# Patient Record
Sex: FEMALE | Race: WHITE | Employment: OTHER | URBAN - METROPOLITAN AREA
[De-identification: names, ages, dates, MRNs, and addresses within clinical notes are randomized per-mention and may not be internally consistent; named-entity substitution may affect disease eponyms.]

---

## 2021-11-04 ENCOUNTER — OFFICE VISIT (OUTPATIENT)
Dept: FAMILY MEDICINE CLINIC | Age: 66
End: 2021-11-04
Payer: MEDICARE

## 2021-11-04 ENCOUNTER — NURSE ONLY (OUTPATIENT)
Dept: LAB | Age: 66
End: 2021-11-04

## 2021-11-04 VITALS
RESPIRATION RATE: 12 BRPM | HEIGHT: 63 IN | HEART RATE: 78 BPM | BODY MASS INDEX: 34.38 KG/M2 | WEIGHT: 194 LBS | TEMPERATURE: 97.9 F | SYSTOLIC BLOOD PRESSURE: 112 MMHG | DIASTOLIC BLOOD PRESSURE: 68 MMHG

## 2021-11-04 DIAGNOSIS — G60.9 IDIOPATHIC PERIPHERAL NEUROPATHY: ICD-10-CM

## 2021-11-04 DIAGNOSIS — M81.0 OSTEOPOROSIS WITHOUT CURRENT PATHOLOGICAL FRACTURE, UNSPECIFIED OSTEOPOROSIS TYPE: ICD-10-CM

## 2021-11-04 DIAGNOSIS — Z12.31 ENCOUNTER FOR SCREENING MAMMOGRAM FOR BREAST CANCER: ICD-10-CM

## 2021-11-04 DIAGNOSIS — Z13.31 POSITIVE DEPRESSION SCREENING: ICD-10-CM

## 2021-11-04 DIAGNOSIS — I25.10 CORONARY ARTERY DISEASE INVOLVING NATIVE CORONARY ARTERY OF NATIVE HEART WITHOUT ANGINA PECTORIS: ICD-10-CM

## 2021-11-04 DIAGNOSIS — G43.009 MIGRAINE WITHOUT AURA AND WITHOUT STATUS MIGRAINOSUS, NOT INTRACTABLE: ICD-10-CM

## 2021-11-04 DIAGNOSIS — I25.10 CORONARY ARTERY DISEASE INVOLVING NATIVE CORONARY ARTERY OF NATIVE HEART WITHOUT ANGINA PECTORIS: Primary | ICD-10-CM

## 2021-11-04 DIAGNOSIS — R09.82 POST-NASAL DRAINAGE: ICD-10-CM

## 2021-11-04 DIAGNOSIS — E53.8 VITAMIN B 12 DEFICIENCY: ICD-10-CM

## 2021-11-04 DIAGNOSIS — K21.9 GASTROESOPHAGEAL REFLUX DISEASE WITHOUT ESOPHAGITIS: ICD-10-CM

## 2021-11-04 LAB
ALBUMIN SERPL-MCNC: 4.7 G/DL (ref 3.5–5.1)
ALP BLD-CCNC: 124 U/L (ref 38–126)
ALT SERPL-CCNC: 12 U/L (ref 11–66)
ANION GAP SERPL CALCULATED.3IONS-SCNC: 11 MEQ/L (ref 8–16)
AST SERPL-CCNC: 17 U/L (ref 5–40)
BACTERIA: ABNORMAL
BASOPHILS # BLD: 1 %
BASOPHILS ABSOLUTE: 0.1 THOU/MM3 (ref 0–0.1)
BILIRUB SERPL-MCNC: 0.3 MG/DL (ref 0.3–1.2)
BILIRUBIN URINE: NEGATIVE
BLOOD, URINE: ABNORMAL
BUN BLDV-MCNC: 7 MG/DL (ref 7–22)
CALCIUM SERPL-MCNC: 9.3 MG/DL (ref 8.5–10.5)
CASTS: ABNORMAL /LPF
CHARACTER, URINE: CLEAR
CHLORIDE BLD-SCNC: 97 MEQ/L (ref 98–111)
CHOLESTEROL, TOTAL: 288 MG/DL (ref 100–199)
CO2: 30 MEQ/L (ref 23–33)
COLOR: YELLOW
CREAT SERPL-MCNC: 0.5 MG/DL (ref 0.4–1.2)
EOSINOPHIL # BLD: 1.6 %
EOSINOPHILS ABSOLUTE: 0.1 THOU/MM3 (ref 0–0.4)
EPITHELIAL CELLS, UA: ABNORMAL /HPF
ERYTHROCYTE [DISTWIDTH] IN BLOOD BY AUTOMATED COUNT: 13.7 % (ref 11.5–14.5)
ERYTHROCYTE [DISTWIDTH] IN BLOOD BY AUTOMATED COUNT: 50.7 FL (ref 35–45)
FOLATE: 13.5 NG/ML (ref 4.8–24.2)
GFR SERPL CREATININE-BSD FRML MDRD: > 90 ML/MIN/1.73M2
GLUCOSE BLD-MCNC: 100 MG/DL (ref 70–108)
GLUCOSE, URINE: NEGATIVE MG/DL
HCT VFR BLD CALC: 47.1 % (ref 37–47)
HDLC SERPL-MCNC: 72 MG/DL
HEMOGLOBIN: 14.7 GM/DL (ref 12–16)
IMMATURE GRANS (ABS): 0.03 THOU/MM3 (ref 0–0.07)
IMMATURE GRANULOCYTES: 0.3 %
KETONES, URINE: NEGATIVE
LDL CHOLESTEROL CALCULATED: 163 MG/DL
LEUKOCYTE ESTERASE, URINE: NEGATIVE
LYMPHOCYTES # BLD: 23.6 %
LYMPHOCYTES ABSOLUTE: 2.1 THOU/MM3 (ref 1–4.8)
MAGNESIUM: 1.9 MG/DL (ref 1.6–2.4)
MCH RBC QN AUTO: 31.1 PG (ref 26–33)
MCHC RBC AUTO-ENTMCNC: 31.2 GM/DL (ref 32.2–35.5)
MCV RBC AUTO: 99.6 FL (ref 81–99)
MONOCYTES # BLD: 4.8 %
MONOCYTES ABSOLUTE: 0.4 THOU/MM3 (ref 0.4–1.3)
NITRITE, URINE: NEGATIVE
NUCLEATED RED BLOOD CELLS: 0 /100 WBC
PH UA: 8 (ref 5–9)
PLATELET # BLD: 312 THOU/MM3 (ref 130–400)
PMV BLD AUTO: 9.7 FL (ref 9.4–12.4)
POTASSIUM SERPL-SCNC: 3.7 MEQ/L (ref 3.5–5.2)
PROTEIN UA: ABNORMAL MG/DL
RBC # BLD: 4.73 MILL/MM3 (ref 4.2–5.4)
RBC URINE: ABNORMAL /HPF
SEG NEUTROPHILS: 68.7 %
SEGMENTED NEUTROPHILS ABSOLUTE COUNT: 6 THOU/MM3 (ref 1.8–7.7)
SODIUM BLD-SCNC: 138 MEQ/L (ref 135–145)
SPECIFIC GRAVITY UA: 1.02 (ref 1–1.03)
TOTAL PROTEIN: 7.1 G/DL (ref 6.1–8)
TRIGL SERPL-MCNC: 266 MG/DL (ref 0–199)
TSH SERPL DL<=0.05 MIU/L-ACNC: 1.93 UIU/ML (ref 0.4–4.2)
UROBILINOGEN, URINE: 1 EU/DL (ref 0–1)
VITAMIN B-12: 710 PG/ML (ref 211–911)
VITAMIN D 25-HYDROXY: 41 NG/ML (ref 30–100)
WBC # BLD: 8.7 THOU/MM3 (ref 4.8–10.8)
WBC UA: ABNORMAL /HPF
YEAST: ABNORMAL

## 2021-11-04 PROCEDURE — 1123F ACP DISCUSS/DSCN MKR DOCD: CPT | Performed by: FAMILY MEDICINE

## 2021-11-04 PROCEDURE — 1090F PRES/ABSN URINE INCON ASSESS: CPT | Performed by: FAMILY MEDICINE

## 2021-11-04 PROCEDURE — G8417 CALC BMI ABV UP PARAM F/U: HCPCS | Performed by: FAMILY MEDICINE

## 2021-11-04 PROCEDURE — G8484 FLU IMMUNIZE NO ADMIN: HCPCS | Performed by: FAMILY MEDICINE

## 2021-11-04 PROCEDURE — G8400 PT W/DXA NO RESULTS DOC: HCPCS | Performed by: FAMILY MEDICINE

## 2021-11-04 PROCEDURE — 4004F PT TOBACCO SCREEN RCVD TLK: CPT | Performed by: FAMILY MEDICINE

## 2021-11-04 PROCEDURE — 4040F PNEUMOC VAC/ADMIN/RCVD: CPT | Performed by: FAMILY MEDICINE

## 2021-11-04 PROCEDURE — G8427 DOCREV CUR MEDS BY ELIG CLIN: HCPCS | Performed by: FAMILY MEDICINE

## 2021-11-04 PROCEDURE — 99204 OFFICE O/P NEW MOD 45 MIN: CPT | Performed by: FAMILY MEDICINE

## 2021-11-04 PROCEDURE — G8431 POS CLIN DEPRES SCRN F/U DOC: HCPCS | Performed by: FAMILY MEDICINE

## 2021-11-04 PROCEDURE — 3017F COLORECTAL CA SCREEN DOC REV: CPT | Performed by: FAMILY MEDICINE

## 2021-11-04 RX ORDER — OMEPRAZOLE 40 MG/1
40 CAPSULE, DELAYED RELEASE ORAL DAILY
Qty: 90 CAPSULE | Refills: 1 | Status: SHIPPED | OUTPATIENT
Start: 2021-11-04

## 2021-11-04 RX ORDER — FLUTICASONE PROPIONATE 50 MCG
2 SPRAY, SUSPENSION (ML) NASAL DAILY
Qty: 48 G | Refills: 1 | Status: SHIPPED | OUTPATIENT
Start: 2021-11-04

## 2021-11-04 RX ORDER — CHOLECALCIFEROL (VITAMIN D3) 125 MCG
CAPSULE ORAL DAILY
COMMUNITY

## 2021-11-04 RX ORDER — CELECOXIB 200 MG/1
200 CAPSULE ORAL DAILY
COMMUNITY
End: 2022-04-04

## 2021-11-04 RX ORDER — GABAPENTIN 300 MG/1
300 CAPSULE ORAL
COMMUNITY

## 2021-11-04 RX ORDER — RALOXIFENE HYDROCHLORIDE 60 MG/1
60 TABLET, FILM COATED ORAL DAILY
Qty: 90 TABLET | Refills: 1 | Status: SHIPPED | OUTPATIENT
Start: 2021-11-04 | End: 2022-03-08

## 2021-11-04 RX ORDER — DILTIAZEM HYDROCHLORIDE 120 MG/1
120 CAPSULE, EXTENDED RELEASE ORAL DAILY
Qty: 90 CAPSULE | Refills: 1 | Status: SHIPPED | OUTPATIENT
Start: 2021-11-04 | End: 2022-03-08

## 2021-11-04 RX ORDER — DILTIAZEM HYDROCHLORIDE 120 MG/1
120 CAPSULE, EXTENDED RELEASE ORAL DAILY
COMMUNITY
End: 2021-11-04 | Stop reason: SDUPTHER

## 2021-11-04 RX ORDER — ATORVASTATIN CALCIUM 10 MG/1
10 TABLET, FILM COATED ORAL DAILY
Qty: 30 TABLET | Refills: 3 | Status: SHIPPED | OUTPATIENT
Start: 2021-11-04 | End: 2022-04-04

## 2021-11-04 RX ORDER — SERTRALINE HYDROCHLORIDE 100 MG/1
100 TABLET, FILM COATED ORAL DAILY
Qty: 135 TABLET | Refills: 1 | Status: SHIPPED | OUTPATIENT
Start: 2021-11-04 | End: 2022-03-08

## 2021-11-04 RX ORDER — RALOXIFENE HYDROCHLORIDE 60 MG/1
60 TABLET, FILM COATED ORAL DAILY
COMMUNITY
End: 2021-11-04 | Stop reason: SDUPTHER

## 2021-11-04 RX ORDER — MULTIVIT WITH MINERALS/LUTEIN
1000 TABLET ORAL DAILY
COMMUNITY

## 2021-11-04 RX ORDER — SERTRALINE HYDROCHLORIDE 100 MG/1
100 TABLET, FILM COATED ORAL DAILY
COMMUNITY
End: 2021-11-04 | Stop reason: SDUPTHER

## 2021-11-04 RX ORDER — CYCLOBENZAPRINE HCL 5 MG
5 TABLET ORAL 3 TIMES DAILY PRN
COMMUNITY
End: 2022-04-04

## 2021-11-04 RX ORDER — CYANOCOBALAMIN 1000 UG/ML
1000 INJECTION INTRAMUSCULAR; SUBCUTANEOUS
COMMUNITY
End: 2022-01-03 | Stop reason: SDUPTHER

## 2021-11-04 SDOH — ECONOMIC STABILITY: FOOD INSECURITY: WITHIN THE PAST 12 MONTHS, THE FOOD YOU BOUGHT JUST DIDN'T LAST AND YOU DIDN'T HAVE MONEY TO GET MORE.: PATIENT DECLINED

## 2021-11-04 SDOH — ECONOMIC STABILITY: FOOD INSECURITY: WITHIN THE PAST 12 MONTHS, YOU WORRIED THAT YOUR FOOD WOULD RUN OUT BEFORE YOU GOT MONEY TO BUY MORE.: PATIENT DECLINED

## 2021-11-04 ASSESSMENT — PATIENT HEALTH QUESTIONNAIRE - PHQ9
SUM OF ALL RESPONSES TO PHQ9 QUESTIONS 1 & 2: 3
7. TROUBLE CONCENTRATING ON THINGS, SUCH AS READING THE NEWSPAPER OR WATCHING TELEVISION: 0
SUM OF ALL RESPONSES TO PHQ QUESTIONS 1-9: 11
4. FEELING TIRED OR HAVING LITTLE ENERGY: 3
2. FEELING DOWN, DEPRESSED OR HOPELESS: 0
5. POOR APPETITE OR OVEREATING: 0
6. FEELING BAD ABOUT YOURSELF - OR THAT YOU ARE A FAILURE OR HAVE LET YOURSELF OR YOUR FAMILY DOWN: 0
9. THOUGHTS THAT YOU WOULD BE BETTER OFF DEAD, OR OF HURTING YOURSELF: 0
SUM OF ALL RESPONSES TO PHQ QUESTIONS 1-9: 11
1. LITTLE INTEREST OR PLEASURE IN DOING THINGS: 3
SUM OF ALL RESPONSES TO PHQ QUESTIONS 1-9: 11
3. TROUBLE FALLING OR STAYING ASLEEP: 2
8. MOVING OR SPEAKING SO SLOWLY THAT OTHER PEOPLE COULD HAVE NOTICED. OR THE OPPOSITE, BEING SO FIGETY OR RESTLESS THAT YOU HAVE BEEN MOVING AROUND A LOT MORE THAN USUAL: 3
10. IF YOU CHECKED OFF ANY PROBLEMS, HOW DIFFICULT HAVE THESE PROBLEMS MADE IT FOR YOU TO DO YOUR WORK, TAKE CARE OF THINGS AT HOME, OR GET ALONG WITH OTHER PEOPLE: 0

## 2021-11-04 ASSESSMENT — SOCIAL DETERMINANTS OF HEALTH (SDOH): HOW HARD IS IT FOR YOU TO PAY FOR THE VERY BASICS LIKE FOOD, HOUSING, MEDICAL CARE, AND HEATING?: PATIENT DECLINED

## 2021-11-04 ASSESSMENT — COLUMBIA-SUICIDE SEVERITY RATING SCALE - C-SSRS
2. HAVE YOU ACTUALLY HAD ANY THOUGHTS OF KILLING YOURSELF?: NO
1. WITHIN THE PAST MONTH, HAVE YOU WISHED YOU WERE DEAD OR WISHED YOU COULD GO TO SLEEP AND NOT WAKE UP?: NO
6. HAVE YOU EVER DONE ANYTHING, STARTED TO DO ANYTHING, OR PREPARED TO DO ANYTHING TO END YOUR LIFE?: NO

## 2021-11-04 NOTE — PROGRESS NOTES
[address] [place]  [address] [place], [address]  Ph:   [phone]  Fax: [phone]    Provider:  Dr. Benjamin Daley Patient Progress Note    Patient:  Sammie Larios  YOB: 1955      Visit Date:  11/4/2021                                              Reason For Visit:   Chief Complaint   Patient presents with    New Patient     establish pcp    Headache     2nd episode of headache Pain is constant from bridge of nose back     Drainage     x 2-3 days drainage     Insomnia     Problems staying asleep         Aura Julieta is a 74 y.o. female, new patient, who comes today to the office because of follow-up of her multiple medical problems and to establish a primary care physician. Today she complains of headache which has been present for about 5 or 6 days, localized mainly in the frontal aspect of the face and goes to the top of the head. She does admit also postnasal drainage for the last several days. She does has history of migraines headaches but this 1 feels different. Also she is having prominence staying asleep. Her past medical history remarkable for coronary artery disease, status post angioplasty in 1997. She is not taking a statin or beta-blocker. She is taking Cardizem and a baby aspirin a day. She needs a referral to a new cardiologist locally. She denies any chest pain, shortness of breath, edema lower extremities. She also has history osteoporosis, diagnosed with a bone density. She is taking Evista 60 mg 1 tablet mouth daily, vitamin D 5000 international units every day. She is taking calcium carbonate 600 mg 2 a day. History of depression for which she is taking Zoloft 150 mg daily. Her symptoms seems to be fairly well controlled. She does have chronic leg pain and she has been diagnosed with idiopathic peripheral neuropathy. She does not know the reason for it but she does have history of vitamin B12 deficiency.   She is taking Neurontin 300 mg every day, Celebrex 200 mg p.o. daily and vitamin B12 1000 mcg IM every month. She has GERD and takes Prilosec 40 mg every day and is controlling the symptoms.     Significant Past Medical History:      Past Medical History:   Diagnosis Date    Age-related osteoporosis without current pathological fracture     Coronary artery disease involving native coronary artery of native heart without angina pectoris     s/p angioplasty     Depression 2015    GERD without esophagitis     Idiopathic peripheral neuropathy     Lumbar degenerative disc disease 2015    Sleep apnea 2000    uvulectomy/oral surgery    Vitamin B12 deficiency          Past Surgical History:   Procedure Laterality Date   [address]    ARTHROPLASTY  2020    Left basal arthroplasty lignament reconstruction    [address] [facility]      CARDIAC CATHETERIZATION  2017    CARPAL TUNNEL RELEASE      Right 2012 Left 2013    CHOLECYSTECTOMY      HYSTERECTOMY, TOTAL ABDOMINAL  1981     left ovary removed     NECK SURGERY  2012    Fatty Tumor removed    [address] [facility] THROAT SURGERY  2009    For sleep apnea     TONSILLECTOMY  [address]      Right leg vein ligation and stripping      Family History   Problem Relation Age of Onset    Heart Disease Mother         pacemaker     Stroke Mother     Heart Attack Mother     Parkinsonism Mother     No Known Problems Father     Heart Attack Sister          at 61    Parkinsonism Maternal Grandmother     Heart Disease Maternal Grandfather      Social History     Socioeconomic History    Marital status:      Spouse name: None    Number of children: None    Years of education: None    Highest education level: None   Occupational History    None   Tobacco Use    Smoking status: Current Every Day Smoker     Packs/day: 0.50     Years: 21.00     Pack years: 10.50    Smokeless tobacco: Never Used   Vaping Use    Vaping Use: Never used   Substance and Sexual Activity    Alcohol use: Not Currently    Drug use: Not Currently    Sexual activity: Not Currently   Other Topics Concern    None   Social History Narrative    None     Social Determinants of Health     Financial Resource Strain: Unknown    Difficulty of Paying Living Expenses: Patient refused   Food Insecurity: Unknown    Worried About Running Out of Food in the Last Year: Patient refused    Ran Out of Food in the Last Year: Patient refused   Transportation Needs:     Lack of Transportation (Medical):  Lack of Transportation (Non-Medical):    Physical Activity:     Days of Exercise per Week:     Minutes of Exercise per Session:    Stress:     Feeling of Stress :    Social Connections:     Frequency of Communication with Friends and Family:     Frequency of Social Gatherings with Friends and Family:     Attends Latter day Services:     Active Member of Clubs or Organizations:     Attends Club or Organization Meetings:     Marital Status:    Intimate Partner Violence:     Fear of Current or Ex-Partner:     Emotionally Abused:     Physically Abused:     Sexually Abused:      Health Maintenance Due   Topic Date Due    Hepatitis C screen  Never done    DTaP/Tdap/Td vaccine (1 - Tdap) Never done    Colon cancer screen colonoscopy  Never done    Shingles Vaccine (1 of 2) Never done    DEXA (modify frequency per FRAX score)  Never done    Pneumococcal 65+ years Vaccine (1 of 1 - PPSV23) Never done    Flu vaccine (1) Never done   [employer] Visit (AWV)  Never done       Allergies:  has No Known Allergies. Medications:   Current Outpatient Medications   Medication Sig Dispense Refill    gabapentin (NEURONTIN) 300 MG capsule Take 300 mg by mouth.  2 tabs x 3 daily      celecoxib (CELEBREX) 200 MG capsule Take 200 mg by mouth daily      cyclobenzaprine (FLEXERIL) 5 MG tablet Take 5 mg by mouth 3 times daily as needed for Muscle spasms 1-3 daily prn, 2 @@ bedtime      ASPIRIN 81 PO Take by mouth daily      Cholecalciferol (VITAMIN D) 125 MCG (5000 UT) CAPS Take by mouth daily      cyanocobalamin 1000 MCG/ML injection Inject 1,000 mcg into the muscle Every 2 wks      Calcium Carbonate (CALCIUM 600 PO) Take 600 mg by mouth 1 tab x 2 daily      Ascorbic Acid (VITAMIN C) 1000 MG tablet Take 1,000 mg by mouth daily 1 tab x 2 daily      dilTIAZem (CARDIZEM 12 HR) 120 MG extended release capsule Take 1 capsule by mouth daily 90 capsule 1    sertraline (ZOLOFT) 100 MG tablet Take 1 tablet by mouth daily 1.5 daily 135 tablet 1    raloxifene (EVISTA) 60 MG tablet Take 1 tablet by mouth daily 90 tablet 1    omeprazole (PRILOSEC) 40 MG delayed release capsule Take 1 capsule by mouth daily 1 tab x 2 daily 90 capsule 1    atorvastatin (LIPITOR) 10 MG tablet Take 1 tablet by mouth daily 30 tablet 3    fluticasone (FLONASE) 50 MCG/ACT nasal spray 2 sprays by Each Nostril route daily 48 g 1     No current facility-administered medications for this visit. Review of systems:  Review of Systems - History obtained from the patient  General ROS: negative for - chills, fatigue or fever  Psychological ROS: negative for - anxiety, depression or sleep disturbances  ENT ROS: positive for - headaches, post nasal drip.    Allergy and Immunology ROS: negative for - seasonal allergies  Hematological and Lymphatic ROS: negative for - bruising  Endocrine ROS: negative for - malaise/lethargy, polydipsia/polyuria or temperature intolerance  Respiratory ROS: negative for - cough,  shortness of breath or wheezing  Cardiovascular ROS: negative for - chest pain or edema  Gastrointestinal ROS: negative for - abdominal pain, constipation, diarrhea or nausea/vomiting  Musculoskeletal ROS: positive for -  muscle pain, leg pain  Neurological ROS: negative for - dizziness  Dermatological ROS: positive for - rash in face    Physical Examination:  Blood pressure 112/68, pulse 78, temperature 97.9 °F (36.6 °C), temperature source Temporal, resp. rate 12, height 5' 3\" (1.6 m), weight 194 lb (88 kg). General-  Alert and oriented x 3, NAD  HEENT: NC, AT, PERRLA, EOMI, anicteric sclerae  Ears: Normal tympanic membranes bilaterally  Nose: swollen turbinates, clear drainage  Mouth: no lesions, moist mucosas  Neck - supple, no significant adenopathy  Chest - clear to auscultation, no wheezes, rales or rhonchi, symmetric air entry  Heart - normal rate, regular rhythm, normal S1, S2, no murmurs, rubs, clicks or gallops  Abdomen - soft, nontender, nondistended, no masses or organomegaly  Extremities - peripheral pulses normal, no pedal edema, no clubbing or cyanosis  Skin - erythematous rash in face, scattered. 1/-1 cm diameter scaly. Treated as \"fungus by dermatologist\"    Impression:   Diagnosis Orders   1. Coronary artery disease involving native coronary artery of native heart without angina pectoris  Lipid Panel    Urinalysis with Microscopic    Comprehensive Metabolic Panel    Christi Bradshaw MD, Cardiology, [facility]   2. Osteoporosis without current pathological fracture, unspecified osteoporosis type  CBC Auto Differential    DEXA BONE DENSITY 2 SITES    Magnesium   3. Vitamin B 12 deficiency  Vitamin B12    Folate   4. Gastroesophageal reflux disease without esophagitis     5. Idiopathic peripheral neuropathy  TSH without Reflex   6. Encounter for screening mammogram for breast cancer  YURI DIGITAL SCREEN W OR WO CAD BILATERAL   7. BMI 34.0-34.9,adult  Vitamin D 25 Hydroxy   8. Body mass index (BMI) 34.0-34.9, adult   CBC Auto Differential   9. Migraine without aura and without status migrainosus, not intractable     10. Post-nasal drainage     11. Positive depression screening  Positive Screen for Clinical Depression with a Documented Follow-up Plan        Plan:   We will start her on Lipitor 10 mg 1 tablet mouth daily and titrate up until we reached the dose of 40 mg every day. Will refer her to cardiology for further evaluation and to establish. We will do lab work and assess his risk factors including CBC, comprehensive panel, lipid profile, magnesium, vitamin B12, folate, TSH and vitamin D. Continue Cardizem 120 mg 1 extended release capsules 1 tablet mouth daily. Continue sertraline for 100 mg 1/2 tablet p.o. daily. Continue Prilosec 40 mg 1 tablet mouth daily. I explained to her that if she is osteoporotic we may try to wean her off the Prilosec or go to the minimal dose to control her symptoms. For now continue Evista 60 mg 1 tab mouth daily. Continue Celebrex and Flexeril as per her pain management physician that she plans to continue seeing. Start Flonase nasal spray 2 sprays each nostril once a day and see if this helps with a headache that seems to be related to rhinitis. Continue calcium carbonate 600 mg 1 tablet mouth twice a day. Continue vitamin D 5000 international units every day. We will schedule her screening mammogram and bone density. We will see her for follow-up in 4 weeks for her annual wellness exam, discuss test results and create a plan for the future.   She states she is up-to-date with her vaccinations    Orders Placed This Encounter   Procedures    YURI DIGITAL SCREEN W OR WO CAD BILATERAL     Standing Status:   Future     Standing Expiration Date:   1/6/2023    DEXA BONE DENSITY 2 SITES     Standing Status:   Future     Standing Expiration Date:   11/4/2022    CBC Auto Differential     Standing Status:   Future     Number of Occurrences:   1     Standing Expiration Date:   11/4/2022    Lipid Panel     Standing Status:   Future     Number of Occurrences:   1     Standing Expiration Date:   11/4/2022     Order Specific Question:   Is Patient Fasting?/# of Hours     Answer:   12 hour    Vitamin D 25 Hydroxy     Standing Status:   Future     Number of Occurrences:   1     Standing Expiration Date:   11/4/2022    Urinalysis with Microscopic     Standing Status:   Future     Number of Occurrences:   1     Standing Expiration Date:   11/5/2022     Order Specific Question:   SPECIFY(EX-CATH,MIDSTREAM,CYSTO,ETC)? Answer:   midstream    TSH without Reflex     Standing Status:   Future     Number of Occurrences:   1     Standing Expiration Date:   11/4/2022    Magnesium     Standing Status:   Future     Number of Occurrences:   1     Standing Expiration Date:   11/4/2022    Vitamin B12     Standing Status:   Future     Number of Occurrences:   1     Standing Expiration Date:   11/4/2022    Comprehensive Metabolic Panel     Standing Status:   Future     Number of Occurrences:   1     Standing Expiration Date:   11/4/2022    Folate     Standing Status:   Future     Number of Occurrences:   1     Standing Expiration Date:   11/4/2022   Jean Aguilar MD, Cardiology, [facility]     Referral Priority:   Routine     Referral Type:   Eval and Treat     Referral Reason:   Specialty Services Required     Referred to Provider:   Eveline Ramirez MD     Requested Specialty:   Cardiology     Number of Visits Requested:   1    Positive Screen for Clinical Depression with a Documented Follow-up Plan        Follow Up:  Return in about 4 weeks (around 12/2/2021) for Medicare AWV.     Nikki Garza MD

## 2021-11-08 ENCOUNTER — TELEPHONE (OUTPATIENT)
Dept: FAMILY MEDICINE CLINIC | Age: 66
End: 2021-11-08

## 2021-11-08 DIAGNOSIS — E78.00 HIGH CHOLESTEROL: Primary | ICD-10-CM

## 2021-11-08 NOTE — TELEPHONE ENCOUNTER
Mignon Stubbs MD   11/4/2021  5:48 PM EDT         Most of the blood test within acceptable ranges.  We will discuss more in detail during the next follow-up appointment. Kyle Laughlin lipid profile showed total cholesterol 288 which is elevated, triglycerides 266 which are also elevated.  The good cholesterol was 72 which is excellent and the bad cholesterol 162 which in her specific situation should be below 100.  Start the statin given today during the visit and will repeat fasting lipid profile in 6 weeks.

## 2021-11-08 NOTE — TELEPHONE ENCOUNTER
Spoke to the patient regarding results. Verbalized understanding. Mailing lab order to repeat in 6 wks.

## 2021-12-02 ENCOUNTER — OFFICE VISIT (OUTPATIENT)
Dept: FAMILY MEDICINE CLINIC | Age: 66
End: 2021-12-02
Payer: MEDICARE

## 2021-12-02 VITALS
HEIGHT: 63 IN | DIASTOLIC BLOOD PRESSURE: 58 MMHG | SYSTOLIC BLOOD PRESSURE: 119 MMHG | HEART RATE: 83 BPM | TEMPERATURE: 96.4 F | RESPIRATION RATE: 12 BRPM | WEIGHT: 192.8 LBS | BODY MASS INDEX: 34.16 KG/M2

## 2021-12-02 DIAGNOSIS — E53.8 VITAMIN B 12 DEFICIENCY: ICD-10-CM

## 2021-12-02 DIAGNOSIS — M79.10 MYALGIA: ICD-10-CM

## 2021-12-02 DIAGNOSIS — G60.9 IDIOPATHIC PERIPHERAL NEUROPATHY: ICD-10-CM

## 2021-12-02 DIAGNOSIS — I25.10 CORONARY ARTERY DISEASE INVOLVING NATIVE CORONARY ARTERY OF NATIVE HEART WITHOUT ANGINA PECTORIS: Primary | ICD-10-CM

## 2021-12-02 DIAGNOSIS — K21.9 GASTROESOPHAGEAL REFLUX DISEASE WITHOUT ESOPHAGITIS: ICD-10-CM

## 2021-12-02 PROCEDURE — 3017F COLORECTAL CA SCREEN DOC REV: CPT | Performed by: FAMILY MEDICINE

## 2021-12-02 PROCEDURE — G8417 CALC BMI ABV UP PARAM F/U: HCPCS | Performed by: FAMILY MEDICINE

## 2021-12-02 PROCEDURE — 4040F PNEUMOC VAC/ADMIN/RCVD: CPT | Performed by: FAMILY MEDICINE

## 2021-12-02 PROCEDURE — 4004F PT TOBACCO SCREEN RCVD TLK: CPT | Performed by: FAMILY MEDICINE

## 2021-12-02 PROCEDURE — G8427 DOCREV CUR MEDS BY ELIG CLIN: HCPCS | Performed by: FAMILY MEDICINE

## 2021-12-02 PROCEDURE — G8484 FLU IMMUNIZE NO ADMIN: HCPCS | Performed by: FAMILY MEDICINE

## 2021-12-02 PROCEDURE — 1090F PRES/ABSN URINE INCON ASSESS: CPT | Performed by: FAMILY MEDICINE

## 2021-12-02 PROCEDURE — G8400 PT W/DXA NO RESULTS DOC: HCPCS | Performed by: FAMILY MEDICINE

## 2021-12-02 PROCEDURE — 99214 OFFICE O/P EST MOD 30 MIN: CPT | Performed by: FAMILY MEDICINE

## 2021-12-02 PROCEDURE — 1123F ACP DISCUSS/DSCN MKR DOCD: CPT | Performed by: FAMILY MEDICINE

## 2021-12-02 RX ORDER — ROSUVASTATIN CALCIUM 5 MG/1
5 TABLET, COATED ORAL NIGHTLY
Qty: 30 TABLET | Refills: 3 | Status: SHIPPED | OUTPATIENT
Start: 2021-12-02 | End: 2022-04-25

## 2021-12-02 RX ORDER — CETIRIZINE HYDROCHLORIDE 10 MG/1
10 TABLET ORAL DAILY
Qty: 90 TABLET | Refills: 1 | Status: SHIPPED | OUTPATIENT
Start: 2021-12-02 | End: 2022-03-08

## 2021-12-02 NOTE — PROGRESS NOTES
[address]  [address] [facility], [address]  Ph:   [phone]  Fax: [phone]    Provider:  Dr. Amy Montilla Note    Patient:  Noe Guo  YOB: 1955      Visit Date:  12/2/2021                                              Reason For Visit:   Chief Complaint   Patient presents with    Follow-up    Discuss Medications     Started Lipitor at last visit -- patient complains of increased muscle spasms mostly in legs         Lani Aguilar is a 74 y.o. female comes today to the office because of follow-up of blood test and her multiple medical problems. Recent blood test performed 11/4/2021 showed an hemoglobin of 14.7, hematocrit 47.1, platelets 991. Lipid profile showed total cholesterol 288, triglycerides 266, HDL 72, and . Folate 13.5, vitamin D 41, TSH 1.930. Urinalysis showed trace of protein, trace of blood. Magnesium was 1.9. Vitamin B12 710. Fasting glucose 100, creatinine 0.5, BUN 7, sodium 138, potassium 3.7, calcium 9.3, AST 17, total protein 7.1, alkaline phosphatase 124, ALT 12. GFR more than 90. Last visit she complained of headaches and she was found to have allergic rhinitis and she was a started on Flonase nasal spray 2 sprays each nostril once a day. The headache is much improved, but she still have some late in the afternoon. She is not taking any antihistaminic. Coronary artery disease:  status post angioplasty in 1997. Last visit she was a started on Lipitor 10 mg 1 tablet mouth daily, but she did not tolerated due to muscle aching in both lower extremities and left arm. She stopped the medication and the pain went away in the next 2 to 3 days. She is taking Cardizem and a baby aspirin a day. She denies any chest pain, shortness of breath, edema lower extremities. Last visit she was referred to the cardiology department to establish there, but she has not been called for that appointment yet.     Osteoporosis: She is taking Evista 60 mg 1 tablet mouth daily, vitamin D 5000 international units every day and calcium carbonate 600 mg 2 tablets a day. She was a scheduled for a bone density but it has not been done yet. Depression: She is taking Zoloft 100 mg 1-1/2 tablet for a total dose of 150 mg every day. She is stable. Idiopathic peripheral neuropathy: Diagnosed about 4 years ago. She has it in both legs but more prominent on the right leg. She also have history of vitamin B12 deficiency. She is taking Neurontin 300 mg every day, Celebrex 200 mg 1 tablet daily and vitamin B12 1000 mcg IM every 2 weeks. GERD: She is taking Prilosec 40 mg 1 tablet mouth daily and her symptoms are well controlled. Significant Past Medical History:      Past Medical History:   Diagnosis Date    Age-related osteoporosis without current pathological fracture 2015    Coronary artery disease involving native coronary artery of native heart without angina pectoris 1997    s/p angioplasty 1997    Depression 2015    GERD without esophagitis 2006    Idiopathic peripheral neuropathy 2004    Lumbar degenerative disc disease 2015    Sleep apnea 2000    uvulectomy/oral surgery    Vitamin B12 deficiency 2004         Health Maintenance Due   Topic Date Due    Hepatitis C screen  Never done    Colon cancer screen colonoscopy  Never done    DEXA (modify frequency per FRAX score)  Never done    Pneumococcal 65+ years Vaccine (2 of 2 - PPSV23) 06/30/2021    Annual Wellness Visit (AWV)  Never done       Allergies:  has No Known Allergies. Medications:   Current Outpatient Medications   Medication Sig Dispense Refill    rosuvastatin (CRESTOR) 5 MG tablet Take 1 tablet by mouth nightly 30 tablet 3    cetirizine (ZYRTEC) 10 MG tablet Take 1 tablet by mouth daily 90 tablet 1    gabapentin (NEURONTIN) 300 MG capsule Take 300 mg by mouth.  2 tabs x 3 daily      celecoxib (CELEBREX) 200 MG capsule Take 200 mg by mouth daily      cyclobenzaprine (FLEXERIL) 5 MG tablet Take 5 mg by mouth 3 times daily as needed for Muscle spasms 1-3 daily prn, 2 @@ bedtime      ASPIRIN 81 PO Take by mouth daily      Cholecalciferol (VITAMIN D) 125 MCG (5000 UT) CAPS Take by mouth daily      cyanocobalamin 1000 MCG/ML injection Inject 1,000 mcg into the muscle Every 2 wks      Calcium Carbonate (CALCIUM 600 PO) Take 600 mg by mouth 1 tab x 2 daily      Ascorbic Acid (VITAMIN C) 1000 MG tablet Take 1,000 mg by mouth daily 1 tab x 2 daily      dilTIAZem (CARDIZEM 12 HR) 120 MG extended release capsule Take 1 capsule by mouth daily 90 capsule 1    sertraline (ZOLOFT) 100 MG tablet Take 1 tablet by mouth daily 1.5 daily 135 tablet 1    raloxifene (EVISTA) 60 MG tablet Take 1 tablet by mouth daily 90 tablet 1    omeprazole (PRILOSEC) 40 MG delayed release capsule Take 1 capsule by mouth daily 1 tab x 2 daily 90 capsule 1    atorvastatin (LIPITOR) 10 MG tablet Take 1 tablet by mouth daily 30 tablet 3    fluticasone (FLONASE) 50 MCG/ACT nasal spray 2 sprays by Each Nostril route daily 48 g 1     No current facility-administered medications for this visit. Review of systems:  Review of Systems - History obtained from the patient  General ROS: negative for - chills, fatigue or fever  Psychological ROS: negative for - anxiety, depression or sleep disturbances  ENT ROS: positive for - post nasal drip, much better.  Headache basically resolved  Allergy and Immunology ROS: negative for - seasonal allergies  Hematological and Lymphatic ROS: negative for - bruising  Endocrine ROS: negative for - malaise/lethargy, polydipsia/polyuria or temperature intolerance  Respiratory ROS: negative for - cough,  shortness of breath or wheezing  Cardiovascular ROS: negative for - chest pain or edema  Gastrointestinal ROS: negative for - abdominal pain, constipation, diarrhea or nausea/vomiting  Musculoskeletal ROS: positive for -  muscle pain, leg pain  Neurological ROS: negative for - dizziness  Dermatological ROS: positive for - rash in face    Physical Examination:  Blood pressure (!) 119/58, pulse 83, temperature 96.4 °F (35.8 °C), temperature source Temporal, resp. rate 12, height 5' 3\" (1.6 m), weight 192 lb 12.8 oz (87.5 kg). General-  Alert and oriented x 3, NAD  HEENT: NC, AT, PERRLA, EOMI, anicteric sclerae  Ears: Normal tympanic membranes bilaterally  Nose: swollen turbinates, clear drainage  Mouth: no lesions, moist mucosas  Neck - supple, no significant adenopathy  Chest - clear to auscultation, no wheezes, rales or rhonchi, symmetric air entry  Heart - normal rate, regular rhythm, normal S1, S2, no murmurs, rubs, clicks or gallops  Abdomen - soft, nontender, nondistended, no masses or organomegaly  Extremities - no pedal edema, no clubbing or cyanosis      Impression:   Diagnosis Orders   1. Coronary artery disease involving native coronary artery of native heart without angina pectoris     2. Vitamin B 12 deficiency     3. Gastroesophageal reflux disease without esophagitis     4. Idiopathic peripheral neuropathy     5. BMI 34.0-34.9,adult     6. Myalgia  CK       Plan:  Start Crestor 5 mg PO daily. She did not tolerate Lipitor. I will give her a prescription for CK and she would do the test if she gets muscle pain again with the Crestor. Will refer again to Cardiology. Continue Cardizem 120 mg 1 extended release capsules daily. Continue Sertraline  100 mg 1 1/2 tablet p.o. daily. Continue Prilosec 40 mg 1 tablet mouth daily. Continue Evista 60 mg 1 tab mouth daily. Continue Celebrex and Flexeril as per her pain management physician that she plans to continue seeing. Continue Flonase nasal spray 2 sprays each nostril once a day  Continue Calcium carbonate 600 mg 1 tablet mouth twice a day. Continue Vitamin D 5000 international units every day.       Orders Placed This Encounter   Procedures    CK     Standing Status:   Future     Standing Expiration Date:   12/2/2022       Follow Up:  Return in about 7 weeks (around 1/20/2022) for Medicare AWV.     Heide Salazar MD

## 2021-12-02 NOTE — PATIENT INSTRUCTIONS
You may receive a survey about your visit with us today. The feedback from our patients helps us identify what is working well and where the service to all patients can be enhanced. Thank you! Potassium-Rich Diet: Care Instructions  Your Care Instructions     Potassium is a mineral. It helps keep the right mix of fluids in your body. It also helps your nerves and muscles work as they should. You'll find it in milk and meats. It's also in all fresh foods, including fruits and vegetables. Most adults need about 5 grams of potassium a day. The foods you eat should supply all that you need. Some health conditions can cause a loss of potassium. For example, kidney problems and stomach problems with vomiting and diarrhea can cause you to lose this mineral. Some medicines, such as water pills (diuretics), can cause low potassium. If you can't get enough potassium from what you eat, your doctor may advise you to take supplements. Follow-up care is a key part of your treatment and safety. Be sure to make and go to all appointments, and call your doctor if you are having problems. It's also a good idea to know your test results and keep a list of the medicines you take. How can you care for yourself at home? 1. Plan your diet around foods that are rich in potassium. Fresh, unprocessed whole foods have the most. These foods include:  ? Milk and other dairy products. ? Vegetables, especially broccoli, cooked dry beans, tomatoes, potatoes, artichokes, winter squash, and spinach. ? Fruits, especially citrus fruits, bananas, and apricots. Dried apricots contain more potassium than fresh apricots. ? Meat, poultry, and fish. 2. Ask your doctor about using a salt substitute or \"light\" salt. These often contain potassium. Where can you learn more? Go to [email or web address] and sign in to your [employer] account.  Enter H315 in the [facility] box to learn more about \"Potassium-Rich Diet: Care Instructions. \"     If you do not have an account, please click on the \"Sign Up Now\" link. Current as of: December 17, 2020               Content Version: 12.9  © 2006-2021 Healthwise, Incorporated. Care instructions adapted under license by [facility] ([facility]). If you have questions about a medical condition or this instruction, always ask your healthcare professional. [address] any warranty or liability for your use of this information.

## 2021-12-16 ENCOUNTER — HOSPITAL ENCOUNTER (OUTPATIENT)
Dept: WOMENS IMAGING | Age: 66
Discharge: HOME | End: 2021-12-16
Payer: MEDICARE

## 2021-12-16 ENCOUNTER — NURSE ONLY (OUTPATIENT)
Dept: LAB | Age: 66
End: 2021-12-16

## 2021-12-16 DIAGNOSIS — Z12.31 ENCOUNTER FOR SCREENING MAMMOGRAM FOR BREAST CANCER: ICD-10-CM

## 2021-12-16 DIAGNOSIS — M79.10 MYALGIA: ICD-10-CM

## 2021-12-16 DIAGNOSIS — M81.0 OSTEOPOROSIS WITHOUT CURRENT PATHOLOGICAL FRACTURE, UNSPECIFIED OSTEOPOROSIS TYPE: ICD-10-CM

## 2021-12-16 LAB — TOTAL CK: 50 U/L (ref 30–135)

## 2021-12-16 PROCEDURE — 77080 DXA BONE DENSITY AXIAL: CPT

## 2021-12-16 PROCEDURE — 77063 BREAST TOMOSYNTHESIS BI: CPT

## 2021-12-17 ENCOUNTER — HOSPITAL ENCOUNTER (OUTPATIENT)
Dept: WOMENS IMAGING | Age: 66
Discharge: HOME | End: 2021-12-17

## 2021-12-17 DIAGNOSIS — Z00.6 ENCOUNTER FOR EXAMINATION FOR NORMAL COMPARISON OR CONTROL IN CLINICAL RESEARCH PROGRAM: ICD-10-CM

## 2021-12-20 ENCOUNTER — TELEPHONE (OUTPATIENT)
Dept: FAMILY MEDICINE CLINIC | Age: 66
End: 2021-12-20

## 2021-12-20 NOTE — TELEPHONE ENCOUNTER
Eliud Mg MD   12/17/2021  9:56 AM EST Back to Top         osteopenia based on the [facility] criteria.  The patient is at a medium risk for fracture.  Make sure sh is taking 1200 mg Ca a day in divided doses and vitamin D at least 800 IU daily

## 2022-01-03 DIAGNOSIS — E53.8 VITAMIN B12 DEFICIENCY: Primary | ICD-10-CM

## 2022-01-03 RX ORDER — CYANOCOBALAMIN 1000 UG/ML
1000 INJECTION INTRAMUSCULAR; SUBCUTANEOUS
Qty: 2 ML | Refills: 2 | Status: SHIPPED | OUTPATIENT
Start: 2022-01-03 | End: 2022-09-22 | Stop reason: SDUPTHER

## 2022-01-03 RX ORDER — SYRINGE W-NEEDLE,DISPOSAB,3 ML 25GX5/8"
1 SYRINGE, EMPTY DISPOSABLE MISCELLANEOUS
Qty: 2 EACH | Refills: 2 | Status: SHIPPED | OUTPATIENT
Start: 2022-01-03 | End: 2022-09-22 | Stop reason: SDUPTHER

## 2022-01-21 ENCOUNTER — TELEPHONE (OUTPATIENT)
Dept: CARDIOLOGY CLINIC | Age: 67
End: 2022-01-21

## 2022-01-21 NOTE — TELEPHONE ENCOUNTER
Pt has open new pt status in referral que      Pt will need a new pt appt     Attempted to call pt, phone rings busy

## 2022-02-01 ENCOUNTER — NURSE ONLY (OUTPATIENT)
Dept: LAB | Age: 67
End: 2022-02-01

## 2022-02-01 DIAGNOSIS — E78.00 HIGH CHOLESTEROL: ICD-10-CM

## 2022-02-01 LAB
CHOLESTEROL, TOTAL: 201 MG/DL (ref 100–199)
HDLC SERPL-MCNC: 73 MG/DL
LDL CHOLESTEROL CALCULATED: 106 MG/DL
TRIGL SERPL-MCNC: 111 MG/DL (ref 0–199)

## 2022-03-08 RX ORDER — CETIRIZINE HYDROCHLORIDE 10 MG/1
TABLET ORAL
Qty: 90 TABLET | Refills: 1 | Status: SHIPPED | OUTPATIENT
Start: 2022-03-08

## 2022-03-08 RX ORDER — DILTIAZEM HYDROCHLORIDE 120 MG/1
CAPSULE, EXTENDED RELEASE ORAL
Qty: 90 CAPSULE | Refills: 1 | Status: SHIPPED | OUTPATIENT
Start: 2022-03-08

## 2022-03-08 RX ORDER — SERTRALINE HYDROCHLORIDE 100 MG/1
TABLET, FILM COATED ORAL
Qty: 135 TABLET | Refills: 1 | Status: SHIPPED | OUTPATIENT
Start: 2022-03-08

## 2022-03-08 RX ORDER — RALOXIFENE HYDROCHLORIDE 60 MG/1
TABLET, FILM COATED ORAL
Qty: 90 TABLET | Refills: 1 | Status: SHIPPED | OUTPATIENT
Start: 2022-03-08

## 2022-03-17 ENCOUNTER — TELEMEDICINE (OUTPATIENT)
Dept: FAMILY MEDICINE CLINIC | Age: 67
End: 2022-03-17
Payer: MEDICARE

## 2022-03-17 DIAGNOSIS — J01.90 ACUTE SINUSITIS WITH SYMPTOMS > 10 DAYS: Primary | ICD-10-CM

## 2022-03-17 PROCEDURE — G8399 PT W/DXA RESULTS DOCUMENT: HCPCS | Performed by: NURSE PRACTITIONER

## 2022-03-17 PROCEDURE — G8427 DOCREV CUR MEDS BY ELIG CLIN: HCPCS | Performed by: NURSE PRACTITIONER

## 2022-03-17 PROCEDURE — 99213 OFFICE O/P EST LOW 20 MIN: CPT | Performed by: NURSE PRACTITIONER

## 2022-03-17 PROCEDURE — 4040F PNEUMOC VAC/ADMIN/RCVD: CPT | Performed by: NURSE PRACTITIONER

## 2022-03-17 PROCEDURE — 1090F PRES/ABSN URINE INCON ASSESS: CPT | Performed by: NURSE PRACTITIONER

## 2022-03-17 PROCEDURE — 1123F ACP DISCUSS/DSCN MKR DOCD: CPT | Performed by: NURSE PRACTITIONER

## 2022-03-17 PROCEDURE — 3017F COLORECTAL CA SCREEN DOC REV: CPT | Performed by: NURSE PRACTITIONER

## 2022-03-17 RX ORDER — AMOXICILLIN AND CLAVULANATE POTASSIUM 875; 125 MG/1; MG/1
1 TABLET, FILM COATED ORAL 2 TIMES DAILY
Qty: 14 TABLET | Refills: 0 | Status: SHIPPED | OUTPATIENT
Start: 2022-03-17 | End: 2022-03-24

## 2022-03-17 ASSESSMENT — ENCOUNTER SYMPTOMS
SHORTNESS OF BREATH: 0
VOMITING: 0
CONSTIPATION: 0
SINUS PAIN: 1
COUGH: 1
NAUSEA: 0
VOICE CHANGE: 1
SINUS PRESSURE: 1
DIARRHEA: 0

## 2022-03-17 NOTE — PROGRESS NOTES
Saad Tavarez (:  1955) is a Established patient, here for evaluation of the following:    Assessment & Plan   Below is the assessment and plan developed based on review of pertinent history, physical exam, labs, studies, and medications. 1. Acute sinusitis with symptoms > 10 days  -     amoxicillin-clavulanate (AUGMENTIN) 875-125 MG per tablet; Take 1 tablet by mouth 2 times daily for 7 days, Disp-14 tablet, R-0Normal    Return if symptoms worsen or fail to improve. Subjective   HPI    2 weeks of cough with yellow phlegm, laryngitis, headache, teeth hurt. Denies fevers, chills, GI symptoms. Has tried Tylenol, mucinex, nyquil, ibuprofen without relief of symptoms. vaccinated against flu and COVID. Review of Systems   Constitutional: Negative for chills and fever. HENT: Positive for congestion, dental problem, sinus pressure, sinus pain and voice change. Negative for ear pain. Respiratory: Positive for cough. Negative for shortness of breath. Gastrointestinal: Negative for constipation, diarrhea, nausea and vomiting. Neurological: Positive for headaches. All other systems reviewed and are negative.          Objective   Patient-Reported Vitals  No data recorded     Physical Exam  [INSTRUCTIONS:  \"[x]\" Indicates a positive item  \"[]\" Indicates a negative item  -- DELETE ALL ITEMS NOT EXAMINED]    Constitutional: [x] Appears well-developed and well-nourished [x] No apparent distress      [] Abnormal -     Mental status: [x] Alert and awake  [x] Oriented to person/place/time [x] Able to follow commands    [] Abnormal -     Eyes:   EOM    [x]  Normal    [] Abnormal -   Sclera  [x]  Normal    [] Abnormal -          Discharge [x]  None visible   [] Abnormal -     HENT: [x] Normocephalic, atraumatic  [] Abnormal -   [x] Mouth/Throat: Mucous membranes are moist    External Ears [x] Normal  [] Abnormal -    Neck: [x] No visualized mass [] Abnormal -     Pulmonary/Chest: [x] Respiratory effort normal   [x] No visualized signs of difficulty breathing or respiratory distress        [] Abnormal -      Musculoskeletal:   [x] Normal gait with no signs of ataxia         [x] Normal range of motion of neck        [] Abnormal -     Neurological:        [x] No Facial Asymmetry (Cranial nerve 7 motor function) (limited exam due to video visit)          [x] No gaze palsy        [] Abnormal -          Skin:        [x] No significant exanthematous lesions or discoloration noted on facial skin         [] Abnormal -            Psychiatric:       [x] Normal Affect [] Abnormal -        [x] No Hallucinations    Other pertinent observable physical exam findings:-                 Marie Wadsworth, was evaluated through a synchronous (real-time) audio-video encounter. The patient (or guardian if applicable) is aware that this is a billable service, which includes applicable co-pays. This Virtual Visit was conducted with patient's (and/or legal guardian's) consent. The visit was conducted pursuant to the emergency declaration under the [address] [place] [address] authority and the [employer] and [employer] General Act. Patient identification was verified, and a caregiver was present when appropriate. The patient was located at home in a state where the provider was licensed to provide care.        --SAMRA Bull - CNP

## 2022-04-04 ENCOUNTER — TELEMEDICINE (OUTPATIENT)
Dept: FAMILY MEDICINE CLINIC | Age: 67
End: 2022-04-04
Payer: MEDICARE

## 2022-04-04 DIAGNOSIS — G44.52 NDPH (NEW DAILY PERSISTENT HEADACHE): Primary | ICD-10-CM

## 2022-04-04 DIAGNOSIS — F41.9 ANXIETY AND DEPRESSION: ICD-10-CM

## 2022-04-04 DIAGNOSIS — R60.0 LOWER LEG EDEMA: ICD-10-CM

## 2022-04-04 DIAGNOSIS — F32.A ANXIETY AND DEPRESSION: ICD-10-CM

## 2022-04-04 DIAGNOSIS — J01.90 ACUTE SINUSITIS WITH SYMPTOMS > 10 DAYS: ICD-10-CM

## 2022-04-04 DIAGNOSIS — G60.9 IDIOPATHIC PERIPHERAL NEUROPATHY: ICD-10-CM

## 2022-04-04 DIAGNOSIS — M85.80 OSTEOPENIA, UNSPECIFIED LOCATION: ICD-10-CM

## 2022-04-04 DIAGNOSIS — K21.9 GASTROESOPHAGEAL REFLUX DISEASE WITHOUT ESOPHAGITIS: ICD-10-CM

## 2022-04-04 DIAGNOSIS — I25.10 CORONARY ARTERY DISEASE INVOLVING NATIVE CORONARY ARTERY OF NATIVE HEART WITHOUT ANGINA PECTORIS: ICD-10-CM

## 2022-04-04 PROCEDURE — 3017F COLORECTAL CA SCREEN DOC REV: CPT | Performed by: NURSE PRACTITIONER

## 2022-04-04 PROCEDURE — 1090F PRES/ABSN URINE INCON ASSESS: CPT | Performed by: NURSE PRACTITIONER

## 2022-04-04 PROCEDURE — G8427 DOCREV CUR MEDS BY ELIG CLIN: HCPCS | Performed by: NURSE PRACTITIONER

## 2022-04-04 PROCEDURE — 1123F ACP DISCUSS/DSCN MKR DOCD: CPT | Performed by: NURSE PRACTITIONER

## 2022-04-04 PROCEDURE — 99214 OFFICE O/P EST MOD 30 MIN: CPT | Performed by: NURSE PRACTITIONER

## 2022-04-04 PROCEDURE — G8399 PT W/DXA RESULTS DOCUMENT: HCPCS | Performed by: NURSE PRACTITIONER

## 2022-04-04 PROCEDURE — 4040F PNEUMOC VAC/ADMIN/RCVD: CPT | Performed by: NURSE PRACTITIONER

## 2022-04-04 RX ORDER — DOXYCYCLINE HYCLATE 100 MG
100 TABLET ORAL 2 TIMES DAILY
Qty: 14 TABLET | Refills: 0 | Status: SHIPPED | OUTPATIENT
Start: 2022-04-04 | End: 2022-04-11

## 2022-04-04 ASSESSMENT — ENCOUNTER SYMPTOMS
VOICE CHANGE: 1
SINUS PRESSURE: 1
COUGH: 1

## 2022-04-21 ENCOUNTER — HOSPITAL ENCOUNTER (OUTPATIENT)
Dept: MRI IMAGING | Age: 67
Discharge: HOME | End: 2022-04-21
Payer: MEDICARE

## 2022-04-21 DIAGNOSIS — G44.52 NDPH (NEW DAILY PERSISTENT HEADACHE): ICD-10-CM

## 2022-04-21 LAB — POC CREATININE WHOLE BLOOD: 0.6 MG/DL (ref 0.5–1.2)

## 2022-04-21 PROCEDURE — 82565 ASSAY OF CREATININE: CPT

## 2022-04-21 PROCEDURE — 70553 MRI BRAIN STEM W/O & W/DYE: CPT

## 2022-04-21 PROCEDURE — A9579 GAD-BASE MR CONTRAST NOS,1ML: HCPCS | Performed by: NURSE PRACTITIONER

## 2022-04-21 PROCEDURE — 6360000004 HC RX CONTRAST MEDICATION: Performed by: NURSE PRACTITIONER

## 2022-04-21 RX ADMIN — GADOTERIDOL 20 ML: 279.3 INJECTION, SOLUTION INTRAVENOUS at 13:32

## 2022-04-25 ENCOUNTER — TELEPHONE (OUTPATIENT)
Dept: FAMILY MEDICINE CLINIC | Age: 67
End: 2022-04-25

## 2022-04-25 RX ORDER — ROSUVASTATIN CALCIUM 10 MG/1
10 TABLET, COATED ORAL NIGHTLY
Qty: 90 TABLET | Refills: 1 | Status: SHIPPED | OUTPATIENT
Start: 2022-04-25 | End: 2022-06-17

## 2022-04-25 NOTE — TELEPHONE ENCOUNTER
Spoke to the patient regarding results. Verbalized understanding. Patient will double Crestor 5mg to equal 10 mg until they are finished. She did request for a new prescription to be sent into her pharmacy. No additional questions at this time.

## 2022-04-25 NOTE — TELEPHONE ENCOUNTER
----- Message from SAMRA Borges CNP sent at 4/22/2022  1:00 PM EDT -----  MRI of the brain showed no acute intracranial abnormalities. There is trace fluid in the left mastoid air cells which correlate with the ears and could cause some pressure. Also showed mild scattered areas chronic small vessel disease. Recommend increasing Crestor to 10 mg daily for further cardiovascular protection.

## 2022-04-26 ENCOUNTER — OFFICE VISIT (OUTPATIENT)
Dept: FAMILY MEDICINE CLINIC | Age: 67
End: 2022-04-26
Payer: MEDICARE

## 2022-04-26 VITALS
BODY MASS INDEX: 36 KG/M2 | SYSTOLIC BLOOD PRESSURE: 133 MMHG | WEIGHT: 203.2 LBS | RESPIRATION RATE: 14 BRPM | DIASTOLIC BLOOD PRESSURE: 64 MMHG | HEART RATE: 87 BPM | TEMPERATURE: 97.7 F | HEIGHT: 63 IN

## 2022-04-26 DIAGNOSIS — E66.01 SEVERE OBESITY (BMI 35.0-39.9) WITH COMORBIDITY (HCC): ICD-10-CM

## 2022-04-26 DIAGNOSIS — B02.9 HERPES ZOSTER WITHOUT COMPLICATION: Primary | ICD-10-CM

## 2022-04-26 DIAGNOSIS — R60.0 LOWER LEG EDEMA: ICD-10-CM

## 2022-04-26 DIAGNOSIS — F17.210 CIGARETTE SMOKER MOTIVATED TO QUIT: ICD-10-CM

## 2022-04-26 DIAGNOSIS — R51.9 CHRONIC DAILY HEADACHE: ICD-10-CM

## 2022-04-26 PROCEDURE — 1090F PRES/ABSN URINE INCON ASSESS: CPT | Performed by: NURSE PRACTITIONER

## 2022-04-26 PROCEDURE — G8427 DOCREV CUR MEDS BY ELIG CLIN: HCPCS | Performed by: NURSE PRACTITIONER

## 2022-04-26 PROCEDURE — 4040F PNEUMOC VAC/ADMIN/RCVD: CPT | Performed by: NURSE PRACTITIONER

## 2022-04-26 PROCEDURE — 99214 OFFICE O/P EST MOD 30 MIN: CPT | Performed by: NURSE PRACTITIONER

## 2022-04-26 PROCEDURE — G8399 PT W/DXA RESULTS DOCUMENT: HCPCS | Performed by: NURSE PRACTITIONER

## 2022-04-26 PROCEDURE — 3017F COLORECTAL CA SCREEN DOC REV: CPT | Performed by: NURSE PRACTITIONER

## 2022-04-26 PROCEDURE — G8417 CALC BMI ABV UP PARAM F/U: HCPCS | Performed by: NURSE PRACTITIONER

## 2022-04-26 PROCEDURE — 4004F PT TOBACCO SCREEN RCVD TLK: CPT | Performed by: NURSE PRACTITIONER

## 2022-04-26 PROCEDURE — 1123F ACP DISCUSS/DSCN MKR DOCD: CPT | Performed by: NURSE PRACTITIONER

## 2022-04-26 RX ORDER — VALACYCLOVIR HYDROCHLORIDE 1 G/1
1000 TABLET, FILM COATED ORAL 3 TIMES DAILY
Qty: 21 TABLET | Refills: 2 | Status: SHIPPED | OUTPATIENT
Start: 2022-04-26 | End: 2022-05-17

## 2022-04-26 RX ORDER — NICOTINE 21 MG/24HR
1 PATCH, TRANSDERMAL 24 HOURS TRANSDERMAL DAILY
Qty: 30 PATCH | Refills: 1 | Status: SHIPPED | OUTPATIENT
Start: 2022-04-26 | End: 2022-06-25

## 2022-04-26 NOTE — PROGRESS NOTES
Yogesh Smith (:  1955) is a 74 y.o. female,Established patient, here for evaluation of the following chief complaint(s):  Rash (Rash on right hip - hx of shingles )         ASSESSMENT/PLAN:  1. Herpes zoster without complication  -     valACYclovir (VALTREX) 1 g tablet; Take 1 tablet by mouth 3 times daily for 21 days, Disp-21 tablet, R-2Normal  2. Cigarette smoker motivated to quit  -     nicotine (NICODERM CQ) 14 MG/24HR; Place 1 patch onto the skin daily, Disp-30 patch, R-1Normal  3. Severe obesity (BMI 35.0-39. 9) with comorbidity ([address]  4. Lower leg edema  5. Chronic daily headache        Nicoderm patch 14 mg/24 hr.   Compression stockings. Discussed referral to neurology for daily persistent headache, but patient would like to hold off until after her  has knee surgery. Return in about 6 weeks (around 2022) for follow up, smoking, weight. Subjective   SUBJECTIVE/OBJECTIVE:  HPI    Right posterior hip, hx Shingles breakout. The area will be painful at first, then the next day she'll have red blisters. She will wash them well with dial soap and antibiotic ointment, and it will usually go away. It happens 3-4 times per year. She has had her Shingles vaccines. This latest outbreak started 2-3 weeks ago, and have healed, but now they itch. She smokes 1/2 ppd. She smokes in her house. She has tried the patches, but it gave her a bad headache. So she quit. Believes she was using a 21 mg/24hrs. She is afraid to gain weight from quitting. BMI 36. She has some swelling in her legs at the end of the day. They are tight and uncomfortable. Still having chronic daily headache. Taking tylenol a couple times daily. Vision exam up to date. MRI of the brain showed showed no acute intracranial abnormalities. Juanetta Cornet is trace fluid in the left mastoid air cells. Also showed mild scattered areas chronic small vessel disease, so her Crestor was increased to 10 mg daily. Review of Systems   Cardiovascular: Positive for leg swelling. Skin: Positive for rash. Neurological: Positive for headaches. All other systems reviewed and are negative. Objective   Physical Exam  Vitals and nursing note reviewed. Constitutional:       General: She is not in acute distress. HENT:      Head: Normocephalic. Right Ear: External ear normal.      Left Ear: External ear normal.      Mouth/Throat:      Pharynx: No oropharyngeal exudate. Cardiovascular:      Rate and Rhythm: Normal rate and regular rhythm. Heart sounds: Normal heart sounds. No murmur heard. No friction rub. No gallop. Pulmonary:      Effort: Pulmonary effort is normal. No respiratory distress. Breath sounds: Normal breath sounds. No wheezing or rales. Abdominal:      General: Bowel sounds are normal. There is no distension. Palpations: Abdomen is soft. Tenderness: There is no abdominal tenderness. There is no rebound. Musculoskeletal:         General: Normal range of motion. Cervical back: Full passive range of motion without pain, normal range of motion and neck supple. Lymphadenopathy:      Cervical: No cervical adenopathy. Skin:     General: Skin is warm and dry. Findings: No erythema or rash. Neurological:      Mental Status: She is alert and oriented to person, place, and time. Psychiatric:         Judgment: Judgment normal.                  An electronic signature was used to authenticate this note.     --Carole Youssef, APRN - CNP

## 2022-04-26 NOTE — PATIENT INSTRUCTIONS
You may receive a survey about your visit with us today. The feedback from our patients helps us identify what is working well and where the service to all patients can be enhanced. Thank you! Nicoderm patch 14 mg/24 hr.   Compression stockings. Discussed referral to neurology for daily persistent headache, but patient would like to hold off until after her  has knee surgery.

## 2022-05-13 ENCOUNTER — OFFICE VISIT (OUTPATIENT)
Dept: CARDIOLOGY CLINIC | Age: 67
End: 2022-05-13
Payer: MEDICARE

## 2022-05-13 VITALS
SYSTOLIC BLOOD PRESSURE: 172 MMHG | BODY MASS INDEX: 35.97 KG/M2 | HEART RATE: 88 BPM | DIASTOLIC BLOOD PRESSURE: 76 MMHG | WEIGHT: 203 LBS | HEIGHT: 63 IN

## 2022-05-13 DIAGNOSIS — I10 PRIMARY HYPERTENSION: ICD-10-CM

## 2022-05-13 DIAGNOSIS — E78.5 DYSLIPIDEMIA: ICD-10-CM

## 2022-05-13 DIAGNOSIS — I25.10 CORONARY ARTERY DISEASE INVOLVING NATIVE CORONARY ARTERY OF NATIVE HEART WITHOUT ANGINA PECTORIS: Primary | ICD-10-CM

## 2022-05-13 DIAGNOSIS — R06.09 DOE (DYSPNEA ON EXERTION): ICD-10-CM

## 2022-05-13 PROCEDURE — 99204 OFFICE O/P NEW MOD 45 MIN: CPT | Performed by: INTERNAL MEDICINE

## 2022-05-13 PROCEDURE — 93000 ELECTROCARDIOGRAM COMPLETE: CPT | Performed by: INTERNAL MEDICINE

## 2022-05-13 PROCEDURE — G8427 DOCREV CUR MEDS BY ELIG CLIN: HCPCS | Performed by: INTERNAL MEDICINE

## 2022-05-13 PROCEDURE — G8417 CALC BMI ABV UP PARAM F/U: HCPCS | Performed by: INTERNAL MEDICINE

## 2022-05-13 PROCEDURE — 1090F PRES/ABSN URINE INCON ASSESS: CPT | Performed by: INTERNAL MEDICINE

## 2022-05-13 RX ORDER — CYCLOBENZAPRINE HCL 5 MG
5 TABLET ORAL 3 TIMES DAILY PRN
COMMUNITY

## 2022-05-13 NOTE — PROGRESS NOTES
Pt C/O sob on exertion, HA for about 2 months in past, swelling in legs from knees down,       Pt denies CP, dizziness, heart palpitations, fatigue

## 2022-05-13 NOTE — PROGRESS NOTES
[facility] [address] [facility]  [address] [address]  Dept: [phone]  Dept Fax: [phone]  Loc: [phone]    Visit Date: 2022    Ms. Anthony Augustin is a 74 y.o. female  who presented for:    Establish cardiac care   New patient referral   CAD    HPI:   HPI   Letha Crenshaw is a pleasant 74year old female patient who  has a past medical history of Age-related osteoporosis without current pathological fracture, Coronary artery disease involving native coronary artery of native heart without angina pectoris, Depression, GERD without esophagitis, Idiopathic peripheral neuropathy, Lumbar degenerative disc disease, Sleep apnea, and Vitamin B12 deficiency. She has h/o tobacco abuse. Patient has h/o CAD, angioplasty in  (denies prior stenting). Reports that in  [facility] revealed nonobstructive. The patient was referred to cardiology for CAD, establish cardiac care. Her FH is positive for MI/pacemaker in her mother. Her sister  from MI at age 61. She has noticed some swelling in her legs lately. Patient denies chest pain. She reports shortness of breath, dyspnea on exertion. She thinks that CHATTERJEE has recently worsened. Patient also reports some recent weight gain. She reports episodes of excessive sweating.        Current Outpatient Medications:     valACYclovir (VALTREX) 1 g tablet, Take 1 tablet by mouth 3 times daily for 21 days, Disp: 21 tablet, Rfl: 2    nicotine (NICODERM CQ) 14 MG/24HR, Place 1 patch onto the skin daily, Disp: 30 patch, Rfl: 1    rosuvastatin (CRESTOR) 10 MG tablet, Take 1 tablet by mouth nightly, Disp: 90 tablet, Rfl: 1    raloxifene (EVISTA) 60 MG tablet, TAKE ONE TABLET EACH DAY FOR HEART, Disp: 90 tablet, Rfl: 1    dilTIAZem (DILACOR XR) 120 MG extended release capsule, TAKE ONE CAPSULE EACH DAY TO HELP CONTROL BLOOD PRESSURE AND HEART, Disp: 90 capsule, Rfl: 1    sertraline (ZOLOFT) 100 MG tablet, Take one and one-half (1&1/2) tablets by mouth one time daily to help control mood. , Disp: 135 tablet, Rfl: 1    cetirizine (ZYRTEC) 10 MG tablet, TAKE ONE TABLET EACH DAY to help control sinus and allergies **DRINK PLENTY OF WATER**, Disp: 90 tablet, Rfl: 1    cyanocobalamin 1000 MCG/ML injection, Inject 1 mL into the muscle every 14 days Every 2 wks, Disp: 2 mL, Rfl: 2    Syringe/Needle, Disp, (SYRINGE 3CC/25GX1\") 25G X 1\" 3 ML MISC, 1 mL by Does not apply route every 14 days, Disp: 2 each, Rfl: 2    gabapentin (NEURONTIN) 300 MG capsule, Take 300 mg by mouth. 2 tabs x 3 daily, Disp: , Rfl:     ASPIRIN 81 PO, Take by mouth daily, Disp: , Rfl:     Cholecalciferol (VITAMIN D) 125 MCG (5000 UT) CAPS, Take by mouth daily, Disp: , Rfl:     Calcium Carbonate (CALCIUM 600 PO), Take 600 mg by mouth 1 tab x 2 daily, Disp: , Rfl:     Ascorbic Acid (VITAMIN C) 1000 MG tablet, Take 1,000 mg by mouth daily 1 tab x 2 daily, Disp: , Rfl:     omeprazole (PRILOSEC) 40 MG delayed release capsule, Take 1 capsule by mouth daily 1 tab x 2 daily, Disp: 90 capsule, Rfl: 1    fluticasone (FLONASE) 50 MCG/ACT nasal spray, 2 sprays by Each Nostril route daily, Disp: 48 g, Rfl: 1    Past Medical History  Maryanne Petersen  has a past medical history of Age-related osteoporosis without current pathological fracture, Coronary artery disease involving native coronary artery of native heart without angina pectoris, Depression, GERD without esophagitis, Idiopathic peripheral neuropathy, Lumbar degenerative disc disease, Sleep apnea, and Vitamin B12 deficiency. Social History  Maryanne Petersen  reports that she has been smoking. She has a 10.50 pack-year smoking history. She has never used smokeless tobacco. She reports previous alcohol use. She reports previous drug use. Family History  Maryanne Petersen family history includes Heart Attack in her mother and sister;  Heart Disease in her maternal grandfather and mother; No Known Problems in her father; Parkinsonism in her maternal grandmother and mother; Stroke in her mother. Past Surgical History   Past Surgical History:   Procedure Laterality Date    [facility]OPLASTY  1997    [facility]    ARTHROPLASTY  2020    Left basal arthroplasty lignament reconstruction    [address] [facility]  2010    CARDIAC CATHETERIZATION  2017    CARPAL TUNNEL RELEASE      Right 2012 Left 2013    CHOLECYSTECTOMY  1994    HYSTERECTOMY, TOTAL ABDOMINAL  1981    1989 left ovary removed     NECK SURGERY  2012    Fatty Tumor removed    [address] [facility] THROAT SURGERY  2009    For sleep apnea     TONSILLECTOMY AND ADENOIDECTOMY  1961    VARICOSE VEIN SURGERY  2020    Right leg vein ligation and stripping        Review of Systems   Constitutional: Negative for chills and fever  HENT: Negative for congestion, sinus pressure, sneezing and sore throat. Eyes: Negative for pain, discharge, redness and itching. Respiratory: Negative for apnea, cough  Gastrointestinal: Negative for blood in stool, constipation, diarrhea   Endocrine: Negative for cold intolerance, heat intolerance, polydipsia. Genitourinary: Negative for dysuria, enuresis, flank pain and hematuria. Musculoskeletal: Negative for arthralgias, joint swelling and neck pain. Neurological: Negative for numbness and headaches. Psychiatric/Behavioral: Negative for agitation, confusion, decreased concentration and dysphoric mood. Objective: There were no vitals taken for this visit. Wt Readings from Last 3 Encounters:   04/26/22 203 lb 3.2 oz (92.2 kg)   12/02/21 192 lb 12.8 oz (87.5 kg)   11/04/21 194 lb (88 kg)     BP Readings from Last 3 Encounters:   04/26/22 133/64   12/02/21 (!) 119/58   11/04/21 112/68       Nursing note and vitals reviewed. Physical Exam   Constitutional: Oriented to person, place, and time. Appears well-developed and well-nourished. ENT: Moist mucous membranes. No bleeding. Tongue is midline.     Head: Normocephalic and atraumatic. Eyes: EOM are normal. Pupils are equal, round, and reactive to light. Neck: Normal range of motion. Neck supple. No JVD present. Cardiovascular: Normal rate, regular rhythm, II/VI systolic murmur, no rubs, and intact distal pulses. Pulmonary/Chest: Effort normal and breath sounds normal. No respiratory distress. No wheezes. No rales. Abdominal: Soft. Bowel sounds are normal. No distension. There is no tenderness. Musculoskeletal: Normal range of motion. trace edema. Neurological: Alert and oriented to person, place, and time. No cranial nerve deficit. Coordination normal.   Skin: Skin is warm and dry. Psychiatric: Normal mood and affect. Lab Results   Component Value Date    CKTOTAL 50 12/16/2021       Lab Results   Component Value Date    WBC 8.7 11/04/2021    RBC 4.73 11/04/2021    HGB 14.7 11/04/2021    HCT 47.1 11/04/2021    MCV 99.6 11/04/2021    MCH 31.1 11/04/2021    MCHC 31.2 11/04/2021     11/04/2021    MPV 9.7 11/04/2021       Lab Results   Component Value Date     11/04/2021    K 3.7 11/04/2021    CL 97 11/04/2021    CO2 30 11/04/2021    BUN 7 11/04/2021    LABALBU 4.7 11/04/2021    CREATININE 0.5 11/04/2021    CALCIUM 9.3 11/04/2021    LABGLOM >90 11/04/2021    GLUCOSE 100 11/04/2021       Lab Results   Component Value Date    ALKPHOS 124 11/04/2021    ALT 12 11/04/2021    AST 17 11/04/2021    PROT 7.1 11/04/2021    BILITOT 0.3 11/04/2021    LABALBU 4.7 11/04/2021       Lab Results   Component Value Date    MG 1.9 11/04/2021       No results found for: INR, PROTIME      No results found for: LABA1C    Lab Results   Component Value Date    TRIG 111 02/01/2022    HDL 73 02/01/2022    LDLCALC 106 02/01/2022       Lab Results   Component Value Date    TSH 1.930 11/04/2021         Testing Reviewed:      I have individually reviewed the cardiac test below:    ECHO: No results found for this or any previous visit.        Ekg:   EKG Interpretation: normal sinus rhythm, PAC's noted. AssessmentPlan:   Jaelyn Hagen is a pleasant 74year old female patient who  has a past medical history of Age-related osteoporosis without current pathological fracture, Coronary artery disease involving native coronary artery of native heart without angina pectoris, Depression, GERD without esophagitis, Idiopathic peripheral neuropathy, Lumbar degenerative disc disease, Sleep apnea, and Vitamin B12 deficiency. She has h/o tobacco abuse. Patient has h/o CAD, angioplasty in  (denies prior stenting). Reports that in  [facility] revealed nonobstructive. The patient was referred to cardiology for CAD, establish cardiac care. Her FH is positive for MI/pacemaker in her mother. Her sister  from MI at age 61. She has noticed some swelling in her legs lately. Patient denies chest pain. She reports shortness of breath, dyspnea on exertion. She thinks that CHATTERJEE has recently worsened. Patient also reports some recent weight gain. She reports episodes of excessive sweating. 1. CAD  2. S/p angioplasty   3. Dyslipidemia   4. Hypertension   5. PACs  6. Worsening dyspnea on exertion      CHATTERJEE    Fatigue   ? Angina equivalent    CAD   Based on patient's risk factors and clinical presentation, stress test is indicated to investigate for possible underlying ischemic heart disease. Patient  agrees with that work up and its risks and benefits and potential need for additional testing if stress test is abnormal such as cardiac catheterization   Has mild leg swelling   ? CHF   Will need to investigate for underlying structural heart disease, will proceed with a transthoracic echocardiogram    TSH ok    holter    Daily weight   Limit Na intake   The patient was instructed to check the blood pressure at home, and record the readings.  Patient will call office with blood pressure readings, will adjust patient's antihypertensive medications as needed accordingly  Crestor   ASA   The patient is advised to begin progressive daily aerobic exercise program and attempt to lose weight      Above findings and plan of care were discussed with patient in details, patient's questions were answered.      Disposition:  RTC in 6 months            Electronically signed by Caitlin Samuel MD, [facility]    5/13/2022 at 7:33 AM EDT

## 2022-06-07 ENCOUNTER — HOSPITAL ENCOUNTER (OUTPATIENT)
Dept: NON INVASIVE DIAGNOSTICS | Age: 67
Discharge: HOME | End: 2022-06-07
Payer: MEDICARE

## 2022-06-07 DIAGNOSIS — E78.5 DYSLIPIDEMIA: ICD-10-CM

## 2022-06-07 DIAGNOSIS — I25.10 CORONARY ARTERY DISEASE INVOLVING NATIVE CORONARY ARTERY OF NATIVE HEART WITHOUT ANGINA PECTORIS: ICD-10-CM

## 2022-06-07 DIAGNOSIS — R06.09 DOE (DYSPNEA ON EXERTION): ICD-10-CM

## 2022-06-07 DIAGNOSIS — I10 PRIMARY HYPERTENSION: ICD-10-CM

## 2022-06-07 LAB
LV EF: 58 %
LVEF MODALITY: NORMAL

## 2022-06-07 PROCEDURE — 78452 HT MUSCLE IMAGE SPECT MULT: CPT

## 2022-06-07 PROCEDURE — 93306 TTE W/DOPPLER COMPLETE: CPT

## 2022-06-07 PROCEDURE — 93225 XTRNL ECG REC<48 HRS REC: CPT

## 2022-06-07 PROCEDURE — 93017 CV STRESS TEST TRACING ONLY: CPT | Performed by: INTERNAL MEDICINE

## 2022-06-07 PROCEDURE — A9500 TC99M SESTAMIBI: HCPCS | Performed by: INTERNAL MEDICINE

## 2022-06-07 PROCEDURE — 93226 XTRNL ECG REC<48 HR SCAN A/R: CPT

## 2022-06-07 PROCEDURE — 6360000002 HC RX W HCPCS

## 2022-06-07 PROCEDURE — 3430000000 HC RX DIAGNOSTIC RADIOPHARMACEUTICAL: Performed by: INTERNAL MEDICINE

## 2022-06-07 RX ADMIN — Medication 31.8 MILLICURIE: at 10:35

## 2022-06-07 RX ADMIN — Medication 9.1 MILLICURIE: at 09:40

## 2022-06-07 NOTE — PROCEDURES
48 hour Holter Monitor was applied to patient.  Patient was instructed to remove monitor on 6/9 at 1154 and return to  of hospital. The serial number on the Holter Monitor is [id number]

## 2022-06-15 LAB
ACQUISITION DURATION: NORMAL S
AVERAGE HEART RATE: 82 BPM
HOOKUP DATE: NORMAL
HOOKUP TIME: NORMAL
MAX HEART RATE TIME/DATE: NORMAL
MAX HEART RATE: 108 BPM
MIN HEART RATE TIME/DATE: NORMAL
MIN HEART RATE: 61 BPM
NUMBER OF QRS COMPLEXES: NORMAL
NUMBER OF SUPRAVENTRICULAR COUPLETS: 24
NUMBER OF SUPRAVENTRICULAR ECTOPICS: NORMAL
NUMBER OF SUPRAVENTRICULAR ISOLATED BEATS: NORMAL
NUMBER OF VENTRICULAR BIGEMINAL CYCLES: 0
NUMBER OF VENTRICULAR COUPLETS: 1
NUMBER OF VENTRICULAR ECTOPICS: 134

## 2022-06-17 ENCOUNTER — TELEMEDICINE (OUTPATIENT)
Dept: FAMILY MEDICINE CLINIC | Age: 67
End: 2022-06-17
Payer: MEDICARE

## 2022-06-17 DIAGNOSIS — I25.10 CORONARY ARTERY DISEASE INVOLVING NATIVE CORONARY ARTERY OF NATIVE HEART WITHOUT ANGINA PECTORIS: ICD-10-CM

## 2022-06-17 DIAGNOSIS — M79.605 LEG PAIN, BILATERAL: Primary | ICD-10-CM

## 2022-06-17 DIAGNOSIS — I73.9 CLAUDICATION OF BOTH LOWER EXTREMITIES (HCC): ICD-10-CM

## 2022-06-17 DIAGNOSIS — M79.604 LEG PAIN, BILATERAL: Primary | ICD-10-CM

## 2022-06-17 PROCEDURE — 99214 OFFICE O/P EST MOD 30 MIN: CPT | Performed by: FAMILY MEDICINE

## 2022-06-17 PROCEDURE — 93922 UPR/L XTREMITY ART 2 LEVELS: CPT | Performed by: FAMILY MEDICINE

## 2022-06-17 PROCEDURE — 3017F COLORECTAL CA SCREEN DOC REV: CPT | Performed by: FAMILY MEDICINE

## 2022-06-17 PROCEDURE — 1090F PRES/ABSN URINE INCON ASSESS: CPT | Performed by: FAMILY MEDICINE

## 2022-06-17 PROCEDURE — G8428 CUR MEDS NOT DOCUMENT: HCPCS | Performed by: FAMILY MEDICINE

## 2022-06-17 PROCEDURE — 1123F ACP DISCUSS/DSCN MKR DOCD: CPT | Performed by: FAMILY MEDICINE

## 2022-06-17 PROCEDURE — G8399 PT W/DXA RESULTS DOCUMENT: HCPCS | Performed by: FAMILY MEDICINE

## 2022-06-17 RX ORDER — ROSUVASTATIN CALCIUM 20 MG/1
20 TABLET, COATED ORAL NIGHTLY
Qty: 90 TABLET | Refills: 1 | Status: SHIPPED | OUTPATIENT
Start: 2022-06-17

## 2022-06-17 NOTE — PROGRESS NOTES
2022    TELEHEALTH EVALUATION -- Audio/Visual (During CZQEQ-14 public health emergency)    HPI:    Finn Mustafa (:  1955) has requested an audio/video evaluation for the following concern(s): Follow-up cardiology work-up and refill on medications. Recently evaluated by cardiology. She has history of coronary artery disease, status post angioplasty in . Left heart cath done 2019 revealed nonobstructive CAD. She does have family history of premature coronary artery disease in her sister who  from MI at age 61 she does complain of lower extremity swelling and off-and-on edema. The pain in the lower extremities is aggravated with mild to moderate walking, the pain is bilateral.  She is taking a statin and wonders if this could be the reason. Work-up results as follows:    ECHO 2022  Summary   Normal left ventricle size and systolic function. Ejection fraction was   estimated at 55-60%. There were no regional left ventricular wall motion   abnormalities and wall thickness was within normal limits. Doppler parameters were consistent with abnormal left ventricular   relaxation (grade 1 diastolic dysfunction). Mildly dilated left atrium. Trace mitral regurgitation. Mild tricuspid regurgitation. Mild aortic regurgitation. Signature      ----------------------------------------------------------------   Electronically signed by Trenton Heath MD (Interpreting   physician) on 2022 at 10:16 AM   ---------------------------------------------------------------       Stress Interpretation   Lexiscan EKG stress test is not suggestive for ischemia.     HOLTER REPORT  Patient name: Wanda Medina  Patient ID: [id number]  Site: [facility] 7045: 48 Hours  INDICATION FOR STUDY: IRREGULAR HEART RATE  CONCLUSION:  Normal sinus rhythm  frequent PACs total 35987  Abnormal holter  Confirmed by DANA POTTER ([id number]) on 6/15/2022 6:45:58 PM    Blood test done 2/1/2022 showed a Total cholesterol 201, triglycerides 111, HDL 73,       Prior to Visit Medications    Medication Sig Taking? Authorizing Provider   rosuvastatin (CRESTOR) 20 MG tablet Take 1 tablet by mouth nightly Yes Benito López MD   cyclobenzaprine (FLEXERIL) 5 MG tablet Take 5 mg by mouth 3 times daily as needed for Muscle spasms  Historical Provider, MD   nicotine (NICODERM CQ) 14 MG/24HR Place 1 patch onto the skin daily  Patient not taking: Reported on 5/13/2022  SAMRA Pimentel CNP   raloxifene (EVISTA) 60 MG tablet TAKE ONE TABLET EACH DAY FOR HEART  Benito López MD   dilTIAZem (DILACOR XR) 120 MG extended release capsule TAKE ONE CAPSULE EACH DAY TO HELP CONTROL BLOOD PRESSURE AND HEART  Benito López MD   sertraline (ZOLOFT) 100 MG tablet Take one and one-half (1&1/2) tablets by mouth one time daily to help control mood. Benito López MD   cetirizine (ZYRTEC) 10 MG tablet TAKE ONE TABLET EACH DAY to help control sinus and allergies **DRINK PLENTY OF WATER**  Benito López MD   cyanocobalamin 1000 MCG/ML injection Inject 1 mL into the muscle every 14 days Every 2 wks  SAMRA Pimentel CNP   Syringe/Needle, Disp, (SYRINGE 3CC/25GX1\") 25G X 1\" 3 ML MISC 1 mL by Does not apply route every 14 days  SAMRA Pimentel CNP   gabapentin (NEURONTIN) 300 MG capsule Take 300 mg by mouth.  2 tabs x 3 daily  Historical Provider, MD   ASPIRIN 81 PO Take by mouth daily  Historical Provider, MD   Cholecalciferol (VITAMIN D) 125 MCG (5000 UT) CAPS Take by mouth daily  Historical Provider, MD   Calcium Carbonate (CALCIUM 600 PO) Take 600 mg by mouth 1 tab x 2 daily  Historical Provider, MD   Ascorbic Acid (VITAMIN C) 1000 MG tablet Take 1,000 mg by mouth daily 1 tab x 2 daily  Historical Provider, MD   omeprazole (PRILOSEC) 40 MG delayed release capsule Take 1 capsule by mouth daily 1 tab x 2 daily  Benito López MD   fluticasone (FLONASE) 50 MCG/ACT nasal spray 2 sprays by Each Nostril route daily  Samy Mcmullen MD       Social History     Tobacco Use    Smoking status: Current Every Day Smoker     Packs/day: 0.50     Years: 21.00     Pack years: 10.50    Smokeless tobacco: Never Used   Vaping Use    Vaping Use: Never used   Substance Use Topics    Alcohol use: Not Currently    Drug use: Not Currently        Past Medical History:   Diagnosis Date    Age-related osteoporosis without current pathological fracture 2015    Coronary artery disease involving native coronary artery of native heart without angina pectoris 1997    s/p angioplasty 1997    Depression 2015    GERD without esophagitis 2006    Idiopathic peripheral neuropathy 2004    Lumbar degenerative disc disease 2015    Sleep apnea 2000    uvulectomy/oral surgery    Vitamin B12 deficiency 2004       PHYSICAL EXAMINATION:    Constitutional: [x] Appears well-developed and well-nourished [x] No apparent distress     Mental status  [x] Alert and awake  [x] Oriented to person/place/time [x]Able to follow commands      Eyes:  EOM    [x]  Normal      HENT:   [x] Normocephalic, atraumatic. [x] Mouth/Throat: Mucous membranes are moist.     External Ears [x] Normal      Neck: [x] No visualized mass     Pulmonary/Chest: [] Respiratory effort normal.  [x] No visualized signs of difficulty breathing or respiratory distress                 Psychiatric:       [x] Normal Affect     ASSESSMENT/PLAN:    1. Leg pain, bilateral  Advised to do CK when she is having leg pain. We will schedule for ankle-brachial indexes  - CK; Future  - 85126 - MD Non-Invas Physiologic STD Extremity ART 1-2 Level    2. Coronary artery disease involving native coronary artery of native heart without angina pectoris  - Lipid Panel; Future    3. Claudication of both lower extremities ([facility] [address]  - 23789 - MD Non-Invas Physiologic STD Extremity ART 1-2 Level  - Lipid Panel; Future      Return in about 4 months (around 10/17/2022).     Mohan Fitzgerald, was evaluated through a synchronous (real-time) audio-video encounter. The patient (or guardian if applicable) is aware that this is a billable service, which includes applicable co-pays. This Virtual Visit was conducted with patient's (and/or legal guardian's) consent. The visit was conducted pursuant to the emergency declaration under the [address] [place] [address], [address] [place] [address] and the [place] and [employer] General Act. Patient identification was verified, and a caregiver was present when appropriate. The patient was located at Home: [address] [place] [address]. [place] [address]. Provider was located at Home ([place] [address]): [place]. Total time spent on this encounter: Not billed by time    --Chen Solano MD on 6/21/2022 at 4:05 PM    An electronic signature was used to authenticate this note.

## 2022-06-20 ENCOUNTER — NURSE ONLY (OUTPATIENT)
Dept: LAB | Age: 67
End: 2022-06-20

## 2022-06-20 DIAGNOSIS — I25.10 CORONARY ARTERY DISEASE INVOLVING NATIVE CORONARY ARTERY OF NATIVE HEART WITHOUT ANGINA PECTORIS: ICD-10-CM

## 2022-06-20 DIAGNOSIS — M79.605 LEG PAIN, BILATERAL: ICD-10-CM

## 2022-06-20 DIAGNOSIS — M79.604 LEG PAIN, BILATERAL: ICD-10-CM

## 2022-06-20 DIAGNOSIS — I73.9 CLAUDICATION OF BOTH LOWER EXTREMITIES (HCC): ICD-10-CM

## 2022-06-20 LAB
CHOLESTEROL, TOTAL: 190 MG/DL (ref 100–199)
HDLC SERPL-MCNC: 60 MG/DL
LDL CHOLESTEROL CALCULATED: 103 MG/DL
TOTAL CK: 63 U/L (ref 30–135)
TRIGL SERPL-MCNC: 137 MG/DL (ref 0–199)

## 2022-08-08 ENCOUNTER — OFFICE VISIT (OUTPATIENT)
Dept: FAMILY MEDICINE CLINIC | Age: 67
End: 2022-08-08
Payer: MEDICARE

## 2022-08-08 VITALS
SYSTOLIC BLOOD PRESSURE: 119 MMHG | HEIGHT: 63 IN | WEIGHT: 204.8 LBS | TEMPERATURE: 97.5 F | BODY MASS INDEX: 36.29 KG/M2 | DIASTOLIC BLOOD PRESSURE: 74 MMHG | HEART RATE: 76 BPM

## 2022-08-08 DIAGNOSIS — M54.2 NECK PAIN: ICD-10-CM

## 2022-08-08 DIAGNOSIS — M85.80 OSTEOPENIA, UNSPECIFIED LOCATION: ICD-10-CM

## 2022-08-08 DIAGNOSIS — F41.9 ANXIETY AND DEPRESSION: ICD-10-CM

## 2022-08-08 DIAGNOSIS — I25.10 CORONARY ARTERY DISEASE INVOLVING NATIVE CORONARY ARTERY OF NATIVE HEART WITHOUT ANGINA PECTORIS: Primary | ICD-10-CM

## 2022-08-08 DIAGNOSIS — M62.838 TRAPEZIUS MUSCLE SPASM: ICD-10-CM

## 2022-08-08 DIAGNOSIS — F32.A ANXIETY AND DEPRESSION: ICD-10-CM

## 2022-08-08 PROCEDURE — G8417 CALC BMI ABV UP PARAM F/U: HCPCS | Performed by: FAMILY MEDICINE

## 2022-08-08 PROCEDURE — 99214 OFFICE O/P EST MOD 30 MIN: CPT | Performed by: FAMILY MEDICINE

## 2022-08-08 PROCEDURE — G8427 DOCREV CUR MEDS BY ELIG CLIN: HCPCS | Performed by: FAMILY MEDICINE

## 2022-08-08 PROCEDURE — 1090F PRES/ABSN URINE INCON ASSESS: CPT | Performed by: FAMILY MEDICINE

## 2022-08-08 PROCEDURE — 3017F COLORECTAL CA SCREEN DOC REV: CPT | Performed by: FAMILY MEDICINE

## 2022-08-08 PROCEDURE — G8399 PT W/DXA RESULTS DOCUMENT: HCPCS | Performed by: FAMILY MEDICINE

## 2022-08-08 PROCEDURE — 1123F ACP DISCUSS/DSCN MKR DOCD: CPT | Performed by: FAMILY MEDICINE

## 2022-08-08 PROCEDURE — 4004F PT TOBACCO SCREEN RCVD TLK: CPT | Performed by: FAMILY MEDICINE

## 2022-08-08 ASSESSMENT — PATIENT HEALTH QUESTIONNAIRE - PHQ9
7. TROUBLE CONCENTRATING ON THINGS, SUCH AS READING THE NEWSPAPER OR WATCHING TELEVISION: 0
10. IF YOU CHECKED OFF ANY PROBLEMS, HOW DIFFICULT HAVE THESE PROBLEMS MADE IT FOR YOU TO DO YOUR WORK, TAKE CARE OF THINGS AT HOME, OR GET ALONG WITH OTHER PEOPLE: 0
2. FEELING DOWN, DEPRESSED OR HOPELESS: 1
SUM OF ALL RESPONSES TO PHQ QUESTIONS 1-9: 5
4. FEELING TIRED OR HAVING LITTLE ENERGY: 3
6. FEELING BAD ABOUT YOURSELF - OR THAT YOU ARE A FAILURE OR HAVE LET YOURSELF OR YOUR FAMILY DOWN: 0
8. MOVING OR SPEAKING SO SLOWLY THAT OTHER PEOPLE COULD HAVE NOTICED. OR THE OPPOSITE, BEING SO FIGETY OR RESTLESS THAT YOU HAVE BEEN MOVING AROUND A LOT MORE THAN USUAL: 0
3. TROUBLE FALLING OR STAYING ASLEEP: 0
1. LITTLE INTEREST OR PLEASURE IN DOING THINGS: 1
SUM OF ALL RESPONSES TO PHQ9 QUESTIONS 1 & 2: 2
SUM OF ALL RESPONSES TO PHQ QUESTIONS 1-9: 5
5. POOR APPETITE OR OVEREATING: 0
SUM OF ALL RESPONSES TO PHQ QUESTIONS 1-9: 5
9. THOUGHTS THAT YOU WOULD BE BETTER OFF DEAD, OR OF HURTING YOURSELF: 0
SUM OF ALL RESPONSES TO PHQ QUESTIONS 1-9: 5

## 2022-08-08 NOTE — PROGRESS NOTES
[address] [facility] [address], [address]  Ph:   [phone]  Fax: [phone]    Provider:  Dr. Gorge Smith Note    Patient:  Celso Hebert  YOB: 1955      Visit Date:  8/8/2022                                              Reason For Visit:   Chief Complaint   Patient presents with    Other     Follow up    Shoulder Pain     Patient has been having shoulder pain that travels up to her head that gives her a headache for the past couple weeks. Johanna Schreiber is a 69 y.o. female comes today to the office because of follow-up of osteoporosis, CAD, depression, vitamin B 12 deficiency. Today she complains of pain in upper back bilaterally which comes up to the neck and occipital area. Pain is constant and throbbing. Pain started about 3 weeks ago and its getting worse, mainly because  its more constant. At first ,she thought it was stress. Her  has been sick. He had a stress test which he did not past, then he had a Cardiac cath and had CAD. He is being treated medically. He was also diagnosed with prostate cancer and had it removed. All this has overwhelmed her. Coronary artery disease:  status post angioplasty in 1997. Last visit she was a started on Crestor 5 mg daily and she did well, so it was increased to 10 mg daily. She is taking Cardizem and a baby aspirin a day. She denies any chest pain, shortness of breath, edema lower extremities. She has been seen by Cardiology, Dr. Karley Castillo.  He continue same medications. He did a stress test, holter monitor, and ECHO. Osteoporosis: She is taking Evista 60 mg 1 tablet mouth daily, vitamin D 5000 international units every day and calcium carbonate 600 mg 2 tablets a day. Last bone density from December 2021 showed  osteopenia based on the World Health Organization criteria    Depression: She is taking Zoloft 100 mg 1-1/2 tablet for a total dose of 150 mg every day. She is stable.  Denies any (ZYRTEC) 10 MG tablet TAKE ONE TABLET EACH DAY to help control sinus and allergies **DRINK PLENTY OF WATER** 90 tablet 1    cyanocobalamin 1000 MCG/ML injection Inject 1 mL into the muscle every 14 days Every 2 wks 2 mL 2    Syringe/Needle, Disp, (SYRINGE 3CC/25GX1\") 25G X 1\" 3 ML MISC 1 mL by Does not apply route every 14 days 2 each 2    gabapentin (NEURONTIN) 300 MG capsule Take 300 mg by mouth. 2 tabs x 3 daily      ASPIRIN 81 PO Take by mouth daily      Cholecalciferol (VITAMIN D) 125 MCG (5000 UT) CAPS Take by mouth daily      Calcium Carbonate (CALCIUM 600 PO) Take 600 mg by mouth 1 tab x 2 daily      Ascorbic Acid (VITAMIN C) 1000 MG tablet Take 1,000 mg by mouth daily 1 tab x 2 daily      omeprazole (PRILOSEC) 40 MG delayed release capsule Take 1 capsule by mouth daily 1 tab x 2 daily 90 capsule 1    fluticasone (FLONASE) 50 MCG/ACT nasal spray 2 sprays by Each Nostril route daily 48 g 1    nicotine (NICODERM CQ) 14 MG/24HR Place 1 patch onto the skin daily (Patient not taking: Reported on 5/13/2022) 30 patch 1     No current facility-administered medications for this visit. Review of systems:  Review of Systems - History obtained from the patient  General ROS: negative for - chills, fatigue or fever  Psychological ROS: negative for - anxiety, depression or sleep disturbances  ENT ROS: positive for - post nasal drip, much better.  Headache basically resolved  Allergy and Immunology ROS: negative for - seasonal allergies  Hematological and Lymphatic ROS: negative for - bruising  Endocrine ROS: negative for - malaise/lethargy, polydipsia/polyuria or temperature intolerance  Respiratory ROS: negative for - cough,  shortness of breath or wheezing  Cardiovascular ROS: negative for - chest pain or edema  Gastrointestinal ROS: negative for - abdominal pain, constipation, diarrhea or nausea/vomiting  Musculoskeletal ROS: positive for -  muscle pain, leg pain, nack pain  Neurological ROS: negative for - dizziness      Physical Examination:  Blood pressure 119/74, pulse 76, temperature 97.5 °F (36.4 °C), temperature source Temporal, height 5' 3\" (1.6 m), weight 204 lb 12.8 oz (92.9 kg). General-  Alert and oriented x 3, NAD  HEENT: NC, AT, PERRLA, EOMI, anicteric sclerae  Ears: Normal tympanic membranes bilaterally  Nose: swollen turbinates, clear drainage  Mouth: no lesions, moist mucosas  Neck - supple, no significant adenopathy. Pain distribution is trapezius and occipital muscles   Chest - clear to auscultation, no wheezes, rales or rhonchi, symmetric air entry  Heart - normal rate, regular rhythm, normal S1, S2, no murmurs, rubs, clicks or gallops  Abdomen - soft, nontender, nondistended, no masses or organomegaly  Extremities - no pedal edema, no clubbing or cyanosis      Impression:   Diagnosis Orders   1. Coronary artery disease involving native coronary artery of native heart without angina pectoris        2. Anxiety and depression        3. Osteopenia, unspecified location        4. Neck pain  XR CERVICAL SPINE (2-3 VIEWS)      5. Trapezius muscle spasm  XR CERVICAL SPINE (2-3 VIEWS)            Plan:  Continue Crestor 10 mg PO daily. She did not tolerate Lipitor. Continue Cardizem 120 mg 1 extended release capsules daily. Continue Sertraline  100 mg 1 1/2 tablet p.o. daily. Continue Prilosec 40 mg 1 tablet mouth daily. Continue Evista 60 mg 1 tab mouth daily. Continue Celebrex and Flexeril as per her pain management physician that she plans to continue seeing. Continue Flonase nasal spray 2 sprays each nostril once a day  Continue Calcium carbonate 600 mg 1 tablet mouth twice a day. Continue Vitamin D 5000 international units every day. Orders Placed This Encounter   Procedures    XR CERVICAL SPINE (2-3 VIEWS)     Standing Status:   Future     Standing Expiration Date:   8/8/2023         Follow Up:  Return in about 6 months (around 2/8/2023).     Mercedes James MD

## 2022-09-22 DIAGNOSIS — E53.8 VITAMIN B12 DEFICIENCY: ICD-10-CM

## 2022-09-26 DIAGNOSIS — E53.8 VITAMIN B12 DEFICIENCY: ICD-10-CM

## 2022-09-26 RX ORDER — CYANOCOBALAMIN 1000 UG/ML
1000 INJECTION INTRAMUSCULAR; SUBCUTANEOUS
Qty: 2 ML | Refills: 2 | Status: SHIPPED | OUTPATIENT
Start: 2022-09-26

## 2022-09-26 RX ORDER — SYRINGE W-NEEDLE,DISPOSAB,3 ML 25GX5/8"
1 SYRINGE, EMPTY DISPOSABLE MISCELLANEOUS
Qty: 2 EACH | Refills: 2 | Status: SHIPPED | OUTPATIENT
Start: 2022-09-26

## 2022-09-29 RX ORDER — CYANOCOBALAMIN 1000 UG/ML
INJECTION, SOLUTION INTRAMUSCULAR; SUBCUTANEOUS
Qty: 2 ML | Refills: 2 | OUTPATIENT
Start: 2022-09-29

## 2022-10-10 ENCOUNTER — TELEMEDICINE (OUTPATIENT)
Dept: FAMILY MEDICINE CLINIC | Age: 67
End: 2022-10-10
Payer: MEDICARE

## 2022-10-10 DIAGNOSIS — U07.1 COVID-19: Primary | ICD-10-CM

## 2022-10-10 PROCEDURE — 1123F ACP DISCUSS/DSCN MKR DOCD: CPT | Performed by: NURSE PRACTITIONER

## 2022-10-10 PROCEDURE — G8399 PT W/DXA RESULTS DOCUMENT: HCPCS | Performed by: NURSE PRACTITIONER

## 2022-10-10 PROCEDURE — 99213 OFFICE O/P EST LOW 20 MIN: CPT | Performed by: NURSE PRACTITIONER

## 2022-10-10 PROCEDURE — 1090F PRES/ABSN URINE INCON ASSESS: CPT | Performed by: NURSE PRACTITIONER

## 2022-10-10 PROCEDURE — 3017F COLORECTAL CA SCREEN DOC REV: CPT | Performed by: NURSE PRACTITIONER

## 2022-10-10 PROCEDURE — G8427 DOCREV CUR MEDS BY ELIG CLIN: HCPCS | Performed by: NURSE PRACTITIONER

## 2022-10-10 RX ORDER — ONDANSETRON 4 MG/1
4 TABLET, ORALLY DISINTEGRATING ORAL EVERY 8 HOURS PRN
Qty: 30 TABLET | Refills: 0 | Status: SHIPPED | OUTPATIENT
Start: 2022-10-10

## 2022-10-10 ASSESSMENT — ENCOUNTER SYMPTOMS
NAUSEA: 1
VOMITING: 1
SINUS PAIN: 1
DIARRHEA: 0
SINUS PRESSURE: 1
SHORTNESS OF BREATH: 0
COUGH: 1
SORE THROAT: 1

## 2022-10-10 NOTE — PROGRESS NOTES
Cullen Fontenot (:  1955) is a Established patient, here for evaluation of the following:    Assessment & Plan   Below is the assessment and plan developed based on review of pertinent history, physical exam, labs, studies, and medications. 1. COVID-19  -     molnupiravir 200 MG capsule; Take 4 capsules by mouth in the morning and 4 capsules in the evening. Do all this for 5 days. , Disp-40 capsule, R-0Normal  -     ondansetron (ZOFRAN ODT) 4 MG disintegrating tablet; Take 1 tablet by mouth every 8 hours as needed for Nausea or Vomiting, Disp-30 tablet, R-0Normal      Given patient's medication list will usability. Very antiviral.  She has been advised on the use of this medication. She has been advised of symptoms that should prompt evaluation in the emergency department. Return if symptoms worsen or fail to improve. Subjective   HPI     positive for COVID in ER on Friday. Janifer Goldmann took positive home test last night, after symptoms started yesterday morning. Endorses headache and sinus congestion, myalgias. .  Nausea and vomiting this morning. Tylenol and Motrin have been ineffective. Denies fevers. Denies shortness of breath and diarrhea. Some mild cough and sore throat. She reports being up-to-date with her COVID vaccines. History CAD, angioplasty . Taking Crestor, Cardizem. Followed by cardiology. For osteoporosis she takes Evista, calcium and vitamin D. Prilosec for GERD  Depression: Zoloft 100 mg 1-1/2 tablet daily for a total of 150 mg daily. Idiopathic peripheral neuropathy taking gabapentin 300 mg daily, Celebrex 200 mg daily, and vitamin B12 IM every 2 weeks. She is followed by a pain management physician. Review of Systems   Constitutional:  Negative for chills and fever. HENT:  Positive for congestion, postnasal drip, sinus pressure, sinus pain and sore throat. Respiratory:  Positive for cough. Negative for shortness of breath.     Gastrointestinal: Positive for nausea and vomiting. Negative for diarrhea. Musculoskeletal:  Positive for myalgias. Neurological:  Positive for headaches. All other systems reviewed and are negative. Objective   Patient-Reported Vitals  No data recorded     Physical Exam  [INSTRUCTIONS:  \"[x]\" Indicates a positive item  \"[]\" Indicates a negative item  -- DELETE ALL ITEMS NOT EXAMINED]    Constitutional: [x] Appears well-developed and well-nourished [x] No apparent distress      [] Abnormal -     Mental status: [x] Alert and awake  [x] Oriented to person/place/time [x] Able to follow commands    [] Abnormal -     Eyes:   EOM    [x]  Normal    [] Abnormal -   Sclera  [x]  Normal    [] Abnormal -          Discharge [x]  None visible   [] Abnormal -     HENT: [x] Normocephalic, atraumatic  [] Abnormal -   [x] Mouth/Throat: Mucous membranes are moist    External Ears [x] Normal  [] Abnormal -    Neck: [x] No visualized mass [] Abnormal -     Pulmonary/Chest: [x] Respiratory effort normal   [x] No visualized signs of difficulty breathing or respiratory distress        [] Abnormal -      Musculoskeletal:   [x] Normal gait with no signs of ataxia         [x] Normal range of motion of neck        [] Abnormal -     Neurological:        [x] No Facial Asymmetry (Cranial nerve 7 motor function) (limited exam due to video visit)          [x] No gaze palsy        [] Abnormal -          Skin:        [x] No significant exanthematous lesions or discoloration noted on facial skin         [] Abnormal -            Psychiatric:       [x] Normal Affect [] Abnormal -        [x] No Hallucinations    Other pertinent observable physical exam findings:-             Peggy Nieto, was evaluated through a synchronous (real-time) audio-video encounter. The patient (or guardian if applicable) is aware that this is a billable service, which includes applicable co-pays. This Virtual Visit was conducted with patient's (and/or legal guardian's) consent.

## 2022-11-11 ENCOUNTER — OFFICE VISIT (OUTPATIENT)
Dept: CARDIOLOGY CLINIC | Age: 67
End: 2022-11-11
Payer: MEDICARE

## 2022-11-11 VITALS
DIASTOLIC BLOOD PRESSURE: 68 MMHG | HEIGHT: 63 IN | WEIGHT: 206.8 LBS | SYSTOLIC BLOOD PRESSURE: 139 MMHG | BODY MASS INDEX: 36.64 KG/M2 | HEART RATE: 88 BPM

## 2022-11-11 DIAGNOSIS — G47.33 OSA (OBSTRUCTIVE SLEEP APNEA): Primary | ICD-10-CM

## 2022-11-11 PROCEDURE — G8399 PT W/DXA RESULTS DOCUMENT: HCPCS | Performed by: INTERNAL MEDICINE

## 2022-11-11 PROCEDURE — 3017F COLORECTAL CA SCREEN DOC REV: CPT | Performed by: INTERNAL MEDICINE

## 2022-11-11 PROCEDURE — G8484 FLU IMMUNIZE NO ADMIN: HCPCS | Performed by: INTERNAL MEDICINE

## 2022-11-11 PROCEDURE — G8417 CALC BMI ABV UP PARAM F/U: HCPCS | Performed by: INTERNAL MEDICINE

## 2022-11-11 PROCEDURE — 99214 OFFICE O/P EST MOD 30 MIN: CPT | Performed by: INTERNAL MEDICINE

## 2022-11-11 PROCEDURE — 4004F PT TOBACCO SCREEN RCVD TLK: CPT | Performed by: INTERNAL MEDICINE

## 2022-11-11 PROCEDURE — 1123F ACP DISCUSS/DSCN MKR DOCD: CPT | Performed by: INTERNAL MEDICINE

## 2022-11-11 PROCEDURE — G8427 DOCREV CUR MEDS BY ELIG CLIN: HCPCS | Performed by: INTERNAL MEDICINE

## 2022-11-11 PROCEDURE — 1090F PRES/ABSN URINE INCON ASSESS: CPT | Performed by: INTERNAL MEDICINE

## 2022-11-11 RX ORDER — DILTIAZEM HYDROCHLORIDE 120 MG/1
CAPSULE, EXTENDED RELEASE ORAL
Qty: 90 CAPSULE | Refills: 3 | Status: SHIPPED | OUTPATIENT
Start: 2022-11-11

## 2022-11-11 NOTE — PROGRESS NOTES
[facility]/ [facility]  [address] [address]  Dept: [phone]  Dept Fax: [phone]  Loc: [phone]    Visit Date: 2022    Ms. Derek Carroll is a 69 y.o. female  who presented for:  CAD  HPI:   HPI   Bowerston Corie is a pleasant 69year old female patient who  has a past medical history of Age-related osteoporosis without current pathological fracture, Coronary artery disease involving native coronary artery of native heart without angina pectoris, Depression, GERD without esophagitis, Idiopathic peripheral neuropathy, Lumbar degenerative disc disease, Sleep apnea, and Vitamin B12 deficiency. Her FH is positive for MI/pacemaker in her mother. Her sister  from MI at age 61. She has h/o tobacco abuse, still smokes. Patient has h/o CAD, angioplasty in  (denies prior stenting). Reports that in  [facility] revealed nonobstructive CAD. Stress test 2022 was negative for ischemia. Echo 2022 revealed preserved EF, grade I DD, Mild AI. Mild swelling was reported on prior office visit, advised to limit Na intake, no swelling at this time. Patient denies chest pain, shortness of breath, dyspnea on exertion. Reports fatigue. She snores, has BETSY, not using her CPAP for years (could not tolerate it). Not seen by sleep medicine in years. /68.       Current Outpatient Medications:     ondansetron (ZOFRAN ODT) 4 MG disintegrating tablet, Take 1 tablet by mouth every 8 hours as needed for Nausea or Vomiting, Disp: 30 tablet, Rfl: 0    cyanocobalamin 1000 MCG/ML injection, Inject 1 mL into the muscle every 14 days Every 2 wks, Disp: 2 mL, Rfl: 2    Syringe/Needle, Disp, (SYRINGE 3CC/25GX1\") 25G X 1\" 3 ML MISC, 1 mL by Does not apply route every 14 days, Disp: 2 each, Rfl: 2    rosuvastatin (CRESTOR) 20 MG tablet, Take 1 tablet by mouth nightly, Disp: 90 tablet, Rfl: 1    cyclobenzaprine (FLEXERIL) 5 MG tablet, Take 5 mg by mouth 3 times daily as needed for Muscle spasms, Disp: , Rfl:     nicotine (NICODERM CQ) 14 MG/24HR, Place 1 patch onto the skin daily (Patient not taking: Reported on 5/13/2022), Disp: 30 patch, Rfl: 1    raloxifene (EVISTA) 60 MG tablet, TAKE ONE TABLET EACH DAY FOR HEART, Disp: 90 tablet, Rfl: 1    dilTIAZem (DILACOR XR) 120 MG extended release capsule, TAKE ONE CAPSULE EACH DAY TO HELP CONTROL BLOOD PRESSURE AND HEART, Disp: 90 capsule, Rfl: 1    sertraline (ZOLOFT) 100 MG tablet, Take one and one-half (1&1/2) tablets by mouth one time daily to help control mood. , Disp: 135 tablet, Rfl: 1    cetirizine (ZYRTEC) 10 MG tablet, TAKE ONE TABLET EACH DAY to help control sinus and allergies **DRINK PLENTY OF WATER**, Disp: 90 tablet, Rfl: 1    gabapentin (NEURONTIN) 300 MG capsule, Take 300 mg by mouth. 2 tabs x 3 daily, Disp: , Rfl:     ASPIRIN 81 PO, Take by mouth daily, Disp: , Rfl:     Cholecalciferol (VITAMIN D) 125 MCG (5000 UT) CAPS, Take by mouth daily, Disp: , Rfl:     Calcium Carbonate (CALCIUM 600 PO), Take 600 mg by mouth 1 tab x 2 daily, Disp: , Rfl:     Ascorbic Acid (VITAMIN C) 1000 MG tablet, Take 1,000 mg by mouth daily 1 tab x 2 daily, Disp: , Rfl:     omeprazole (PRILOSEC) 40 MG delayed release capsule, Take 1 capsule by mouth daily 1 tab x 2 daily, Disp: 90 capsule, Rfl: 1    fluticasone (FLONASE) 50 MCG/ACT nasal spray, 2 sprays by Each Nostril route daily, Disp: 48 g, Rfl: 1    Past Medical History  Indy Mendoza  has a past medical history of Age-related osteoporosis without current pathological fracture, Coronary artery disease involving native coronary artery of native heart without angina pectoris, Depression, GERD without esophagitis, Idiopathic peripheral neuropathy, Lumbar degenerative disc disease, Sleep apnea, and Vitamin B12 deficiency. Social History  Indy Mendoza  reports that she has been smoking cigarettes. She has a 10.50 pack-year smoking history.  She has never used smokeless tobacco. She reports that she does not currently use alcohol. She reports that she does not currently use drugs. Family History  Basilia Garcia family history includes Heart Attack in her mother and sister; Heart Disease in her maternal grandfather and mother; No Known Problems in her father; Parkinsonism in her maternal grandmother and mother; Stroke in her mother. Past Surgical History   Past Surgical History:   Procedure Laterality Date    ANGIOPLASTY  1997    [facility]    ARTHROPLASTY  2020    Left basal arthroplasty lignament reconstruction     [address] 2010    CARDIAC CATHETERIZATION  2017    CARPAL TUNNEL RELEASE      Right 2012 Left 2013    CHOLECYSTECTOMY  1994    HYSTERECTOMY, TOTAL ABDOMINAL (CERVIX REMOVED)  1981 1989 left ovary removed     NECK SURGERY  2012    Fatty Tumor removed     [facility]    THROAT SURGERY  2009    For sleep apnea     TONSILLECTOMY AND ADENOIDECTOMY  1961    VARICOSE VEIN SURGERY  2020    Right leg vein ligation and stripping        Review of Systems   Constitutional: Negative for chills and fever  HENT: Negative for congestion, sinus pressure, sneezing and sore throat. Eyes: Negative for pain, discharge, redness and itching. Respiratory: Negative for apnea, cough  Gastrointestinal: Negative for blood in stool, constipation, diarrhea   Endocrine: Negative for cold intolerance, heat intolerance, polydipsia. Genitourinary: Negative for dysuria, enuresis, flank pain and hematuria. Musculoskeletal: Negative for arthralgias, joint swelling and neck pain. Neurological: Negative for numbness and headaches. Psychiatric/Behavioral: Negative for agitation, confusion, decreased concentration and dysphoric mood. Objective: There were no vitals taken for this visit.     Wt Readings from Last 3 Encounters:   08/08/22 204 lb 12.8 oz (92.9 kg)   05/13/22 203 lb (92.1 kg)   04/26/22 203 lb 3.2 oz (92.2 kg)     BP Readings from Last 3 Encounters: AM    BILITOT 0.3 11/04/2021 10:46 AM    LABALBU 4.7 11/04/2021 10:46 AM       Lab Results   Component Value Date/Time    MG 1.9 11/04/2021 10:46 AM       No results found for: INR, PROTIME      No results found for: LABA1C    Lab Results   Component Value Date/Time    TRIG 137 06/20/2022 10:56 AM    HDL 60 06/20/2022 10:56 AM    LDLCALC 103 06/20/2022 10:56 AM       Lab Results   Component Value Date/Time    TSH 1.930 11/04/2021 10:46 AM         Testing Reviewed:      I have individually reviewed the cardiac test below:    ECHO: No results found for this or any previous visit. Stress Test:6/2022  Imaging Results:Calculated gated LVEF 65 %. The T.I.D. ratio was 1.2 . There is mild attenuation artifact noted in the anterior wall seems to be  related to breast artifact. The nuclear images is not suggestive for myocardial ischemia. Conclusions      Summary   The nuclear images is not suggestive for myocardial ischemia. Recommendation   Clinical correlation is recommended. Signatures      ----------------------------------------------------------------   Electronically signed by Balta Delarosa MD (Interpreting   Cardiologist) on 06/07/2022 at 15:31   ----------------------------------------------------------------    AssessmentPlan:     Fatigue   BETSY  CAD  S/p angioplasty 1997  Dyslipidemia   Hypertension   PACs  Aortic regurgitation     Feels well  No chest pain  No leg swelling   Limit Na intake  Monitor weight  Call office for weight gain, swelling, SOB. ... The patient is advised to quit smoking, begin progressive daily aerobic exercise program, and attempt to lose weight  The patient was instructed to check the blood pressure at home, and record the readings.  Patient will call office with blood pressure readings, will adjust patient's antihypertensive medications as needed accordingly   Reports fatigue  She snores, has BETSY, not using her CPAP for years (could not tolerate it)  Not seen by sleep medicine in years  Agrees to see sleep medicine, will refer     Above findings and plan of care were discussed with patient in details, patient's questions were answered.      Disposition:  RTC in 12 months             Electronically signed by Aria Kasper MD, [facility]    11/10/2022 at 11:40 PM EST

## 2022-11-11 NOTE — PROGRESS NOTES
6 month follow-up. States when she went in for injection in her neck her HR was 38-40. She states she hasn't had that before. EKG in [facility] from 5/2022.

## 2022-11-15 ENCOUNTER — TELEPHONE (OUTPATIENT)
Dept: FAMILY MEDICINE CLINIC | Age: 67
End: 2022-11-15

## 2022-11-15 NOTE — TELEPHONE ENCOUNTER
----- Message from Ivette Wiggins sent at 11/15/2022 11:53 AM EST -----  Subject: Message to Provider    QUESTIONS  Information for Provider? Spoke with patient, she was trying to return a   call that she received from [facility].  She says that it is okay with changing   her appointment time from 3:30 pm to 10:am on 2/13/2023  ---------------------------------------------------------------------------  --------------  [id number] [employer]  [phone]; OK to leave message on voicemail  ---------------------------------------------------------------------------  --------------  SCRIPT ANSWERS  undefined

## 2022-11-23 NOTE — PROGRESS NOTES
[facility]    Patient: Og Villegas, 69 y.o.   : 2022    Pt referred by Dr. Mary Ivey   Patient presents with    New Patient     Consult for Sleep - Prior sleep studies completed over 20yrs ago in [place]. Not currently on PAP Tx. Did have an Echo 22 (printed for review). Referred by Jair Nunes Notes she only used the PAP for about a year but couldn't get used to the mask and the noise. Did have a UPPP in  in [place]. HPI  Janifer Goldmann is here for BETSY, snoring. Symptoms include snoring, feels sleepy during the day, HA. She was diangosed with BETSY 20 years ago and failed PAP. She had UPPP and tongue suspension. She is now snoring again and her  is complaining. She states she typically wakes rested and feels awake most of the day she does get tired later in the evening.       Sleep Hx     Sleep symptoms:  Yes No  Additional Comments   Snoring [x] []    Witness Apneas [] [x]        Waking snorting/gasping [] [x]     Nocturia []    [x]     Legs kicking at night [] [x]     AM Headaches [] [x]     Non-restorative sleep [] [x]     Daytime sleepiness/fatigue [x] []     Daytime napping [] [x]     Drowsiness while driving [] [x]     Memory issues [] [x]     Decreased concentration [] [x]     Cataplexy [] [x]     Hypnogogic hallucinations [] [x]     Sleep paralysis [] [x]     Other [] [x]        Symptoms began 20 year(s)      Previous evaluation and treatment has included-PSG with C PAP titration      DOT/CDL - no  FAA/'s license - no     Previous Report(s) Reviewed: historical medical records, office notes and referral letter/letters      Arthur -  3  SAQLI - 86    Download     Neck Size: 16.75  Mallampati 3      Past Medical hx     PMH:  Past Medical History:   Diagnosis Date    Age-related osteoporosis without current pathological fracture     Coronary artery disease involving native coronary artery of native heart without angina pectoris     s/p angioplasty     Depression     GERD without esophagitis 2006    Idiopathic peripheral neuropathy 2004    Lumbar degenerative disc disease 2015    Sleep apnea 2000    uvulectomy/oral surgery    Vitamin B12 deficiency 2004     SURGICAL HISTORY:  Past Surgical History:   Procedure Laterality Date    [address]      Left basal arthroplasty lignament reconstruction     [address]    CARDIAC CATHETERIZATION  2017    CARPAL TUNNEL RELEASE      Right 2012 Left 2013    CHOLECYSTECTOMY      HYSTERECTOMY, TOTAL ABDOMINAL (CERVIX REMOVED)  1981 left ovary removed     NECK SURGERY      Fatty Tumor removed     [address]      For sleep apnea     TONSILLECTOMY AND [address]      Right leg vein ligation and stripping      SOCIAL HISTORY:  Social History     Tobacco Use    Smoking status: Every Day     Packs/day: 0.50     Years: 21.00     Pack years: 10.50     Types: Cigarettes    Smokeless tobacco: Never    Tobacco comments:     Is worried she will gain too much weight if she quits smoking   Vaping Use    Vaping Use: Never used   Substance Use Topics    Alcohol use: Not Currently    Drug use: Not Currently     ALLERGIES:No Known Allergies  FAMILY HISTORY:  Family History   Problem Relation Age of Onset    Heart Disease Mother         pacemaker     Stroke Mother     Heart Attack Mother     Parkinsonism Mother     No Known Problems Father     Heart Attack Sister          at 61    Parkinsonism Maternal Grandmother     Heart Disease Maternal Grandfather      CURRENT MEDICATIONS:  Current Outpatient Medications   Medication Sig Dispense Refill    dilTIAZem (DILACOR XR) 120 MG extended release capsule TAKE ONE CAPSULE EACH DAY TO HELP CONTROL BLOOD PRESSURE AND HEART 90 capsule 3 ondansetron (ZOFRAN ODT) 4 MG disintegrating tablet Take 1 tablet by mouth every 8 hours as needed for Nausea or Vomiting 30 tablet 0    cyanocobalamin 1000 MCG/ML injection Inject 1 mL into the muscle every 14 days Every 2 wks 2 mL 2    Syringe/Needle, Disp, (SYRINGE 3CC/25GX1\") 25G X 1\" 3 ML MISC 1 mL by Does not apply route every 14 days 2 each 2    rosuvastatin (CRESTOR) 20 MG tablet Take 1 tablet by mouth nightly 90 tablet 1    cyclobenzaprine (FLEXERIL) 5 MG tablet Take 5 mg by mouth 3 times daily as needed for Muscle spasms      raloxifene (EVISTA) 60 MG tablet TAKE ONE TABLET EACH DAY FOR HEART 90 tablet 1    sertraline (ZOLOFT) 100 MG tablet Take one and one-half (1&1/2) tablets by mouth one time daily to help control mood. 135 tablet 1    cetirizine (ZYRTEC) 10 MG tablet TAKE ONE TABLET EACH DAY to help control sinus and allergies **DRINK PLENTY OF WATER** 90 tablet 1    gabapentin (NEURONTIN) 300 MG capsule Take 300 mg by mouth. 2 tabs x 3 daily      ASPIRIN 81 PO Take by mouth daily      Cholecalciferol (VITAMIN D) 125 MCG (5000 UT) CAPS Take by mouth daily      Calcium Carbonate (CALCIUM 600 PO) Take 600 mg by mouth 1 tab x 2 daily      Ascorbic Acid (VITAMIN C) 1000 MG tablet Take 1,000 mg by mouth daily 1 tab x 2 daily      omeprazole (PRILOSEC) 40 MG delayed release capsule Take 1 capsule by mouth daily 1 tab x 2 daily 90 capsule 1    fluticasone (FLONASE) 50 MCG/ACT nasal spray 2 sprays by Each Nostril route daily 48 g 1     No current facility-administered medications for this visit. Nyasia ROPER   Review of Systems   Constitutional:  Negative for activity change, appetite change, chills and fever. HENT:  Negative for congestion and postnasal drip. Eyes: Negative. Respiratory:  Negative for cough, chest tightness, shortness of breath, wheezing and stridor. Cardiovascular:  Negative for chest pain and leg swelling. Gastrointestinal:  Negative for diarrhea and nausea.    Endocrine: Negative. Genitourinary: Negative. Musculoskeletal: Negative. Negative for arthralgias and back pain. Skin: Negative. Allergic/Immunologic: Negative. Neurological: Negative. Negative for dizziness and light-headedness. Psychiatric/Behavioral: Negative. All other systems reviewed and are negative. Physical exam   /78 (Site: Left Upper Arm, Position: Sitting, Cuff Size: Large Adult)   Pulse 66   Temp 97.9 °F (36.6 °C) (Oral)   Ht 5' 3\" (1.6 m)   Wt 207 lb 9.6 oz (94.2 kg)   SpO2 96% Comment: r/a  BMI 36.77 kg/m²      Physical Exam  Constitutional:       Appearance: Normal appearance. She is normal weight. HENT:      Head: Normocephalic and atraumatic. Right Ear: External ear normal.      Left Ear: External ear normal.      Nose: Nose normal.      Mouth/Throat:      Comments: UPPP changes noted  Eyes:      Extraocular Movements: Extraocular movements intact. Conjunctiva/sclera: Conjunctivae normal.      Pupils: Pupils are equal, round, and reactive to light. Pulmonary:      Effort: Pulmonary effort is normal.   Musculoskeletal:      Cervical back: Normal range of motion and neck supple. Neurological:      General: No focal deficit present. Mental Status: She is alert and oriented to person, place, and time. Psychiatric:         Attention and Perception: Attention and perception normal.         Mood and Affect: Mood and affect normal.         Speech: Speech normal.         Behavior: Behavior normal. Behavior is cooperative. Thought Content: Thought content normal.         Cognition and Memory: Cognition normal.         Judgment: Judgment normal.        Sleep Study   Non available   Assessment      Diagnosis Orders   1. BETSY (obstructive sleep apnea)        2. S/P UPPP (uvulopalatopharyngoplasty)        3. Coronary artery disease involving native coronary artery of native heart without angina pectoris        4.  Tobacco abuse             Plan   - given her increased snoring will proceed with PSG  -She never had a follow-up sleep study after her UPPP  -She states her weight is up about 50 pounds over the last several years which may have also contributed to her snoring.  - Did discuss that the smoking will also contribute to her snoring and encouraged smoking cessation, discussed for 4 minutes.  -Discussed treatment options for obstructive sleep apnea including PAP therapy, inspire, weight loss, positional therapy, oral appliance.   - Discussed with her that PAP therapy has dramatically changed in the last 20 years and she might find herself more tolerant of it at this time  - Will await results of PSG, follow up after      Paloma June, [address] for pulmonary and Sleep Medicine  11/28/2022

## 2022-11-28 ENCOUNTER — INITIAL CONSULT (OUTPATIENT)
Dept: PULMONOLOGY | Age: 67
End: 2022-11-28
Payer: MEDICARE

## 2022-11-28 VITALS
HEIGHT: 63 IN | BODY MASS INDEX: 36.79 KG/M2 | TEMPERATURE: 97.9 F | SYSTOLIC BLOOD PRESSURE: 130 MMHG | WEIGHT: 207.6 LBS | OXYGEN SATURATION: 96 % | DIASTOLIC BLOOD PRESSURE: 78 MMHG | HEART RATE: 66 BPM

## 2022-11-28 DIAGNOSIS — G47.33 OSA (OBSTRUCTIVE SLEEP APNEA): Primary | ICD-10-CM

## 2022-11-28 DIAGNOSIS — I25.10 CORONARY ARTERY DISEASE INVOLVING NATIVE CORONARY ARTERY OF NATIVE HEART WITHOUT ANGINA PECTORIS: ICD-10-CM

## 2022-11-28 DIAGNOSIS — Z72.0 TOBACCO ABUSE: ICD-10-CM

## 2022-11-28 DIAGNOSIS — Z98.890 S/P UPPP (UVULOPALATOPHARYNGOPLASTY): ICD-10-CM

## 2022-11-28 PROCEDURE — 1123F ACP DISCUSS/DSCN MKR DOCD: CPT | Performed by: PHYSICIAN ASSISTANT

## 2022-11-28 PROCEDURE — G8417 CALC BMI ABV UP PARAM F/U: HCPCS | Performed by: PHYSICIAN ASSISTANT

## 2022-11-28 PROCEDURE — G8399 PT W/DXA RESULTS DOCUMENT: HCPCS | Performed by: PHYSICIAN ASSISTANT

## 2022-11-28 PROCEDURE — 1090F PRES/ABSN URINE INCON ASSESS: CPT | Performed by: PHYSICIAN ASSISTANT

## 2022-11-28 PROCEDURE — 4004F PT TOBACCO SCREEN RCVD TLK: CPT | Performed by: PHYSICIAN ASSISTANT

## 2022-11-28 PROCEDURE — 99203 OFFICE O/P NEW LOW 30 MIN: CPT | Performed by: PHYSICIAN ASSISTANT

## 2022-11-28 PROCEDURE — G8427 DOCREV CUR MEDS BY ELIG CLIN: HCPCS | Performed by: PHYSICIAN ASSISTANT

## 2022-11-28 PROCEDURE — G8484 FLU IMMUNIZE NO ADMIN: HCPCS | Performed by: PHYSICIAN ASSISTANT

## 2022-11-28 PROCEDURE — 3017F COLORECTAL CA SCREEN DOC REV: CPT | Performed by: PHYSICIAN ASSISTANT

## 2022-11-28 ASSESSMENT — ENCOUNTER SYMPTOMS
STRIDOR: 0
EYES NEGATIVE: 1
BACK PAIN: 0
DIARRHEA: 0
COUGH: 0
CHEST TIGHTNESS: 0
SHORTNESS OF BREATH: 0
ALLERGIC/IMMUNOLOGIC NEGATIVE: 1
WHEEZING: 0
NAUSEA: 0

## 2022-12-20 ENCOUNTER — PATIENT MESSAGE (OUTPATIENT)
Dept: FAMILY MEDICINE CLINIC | Age: 67
End: 2022-12-20

## 2022-12-20 RX ORDER — VALACYCLOVIR HYDROCHLORIDE 1 G/1
1000 TABLET, FILM COATED ORAL EVERY 8 HOURS
Qty: 21 TABLET | Refills: 0 | Status: SHIPPED | OUTPATIENT
Start: 2022-12-20 | End: 2022-12-27

## 2022-12-20 NOTE — TELEPHONE ENCOUNTER
From: Patricia Mccall  To: Dr. [place]: 12/20/2022 11:29 AM EST  Subject: Valacyclovir 1 gram     Could you call in a refill for this medicine. I got up this morning and I am broke out. Also could you prescribe something for the pain. The spots are on my low back close to my tail bone and I really hurt.  LAMIN Baird in [facility]

## 2022-12-28 ENCOUNTER — HOSPITAL ENCOUNTER (OUTPATIENT)
Dept: WOMENS IMAGING | Age: 67
Discharge: HOME | End: 2022-12-28
Payer: MEDICARE

## 2022-12-28 DIAGNOSIS — Z12.31 VISIT FOR SCREENING MAMMOGRAM: ICD-10-CM

## 2022-12-28 PROCEDURE — 77063 BREAST TOMOSYNTHESIS BI: CPT

## 2023-01-08 ENCOUNTER — HOSPITAL ENCOUNTER (OUTPATIENT)
Dept: SLEEP CENTER | Age: 68
Discharge: HOME | End: 2023-01-10
Payer: MEDICARE

## 2023-01-08 DIAGNOSIS — G47.33 OSA (OBSTRUCTIVE SLEEP APNEA): ICD-10-CM

## 2023-01-08 PROCEDURE — 95810 POLYSOM 6/> YRS 4/> PARAM: CPT

## 2023-01-09 LAB — STATUS: NORMAL

## 2023-01-10 NOTE — PROGRESS NOTES
[address] [place] [address]                               SLEEP STUDY REPORT    PATIENT NAME: Governor Morgan                    :        1955  MED REC NO:   [id number]                           ROOM:  ACCOUNT NO:   [de-identified]                           ADMIT DATE: 2023  PROVIDER:     Joe Jay MD    DATE OF STUDY:  2023    REFERRING PROVIDER:  Polina Mckeon PA-C    The patient's height is 63 inches, weight is 207.6 pounds with a BMI of  36.8. HISTORY:  The patient is a 64-year-old female who was initially  evaluated by me Polina Mckeon PA-C, on 2022. The patient had  associated comorbidities including coronary artery disease for which the  patient underwent angioplasty in , depression, gastroesophageal  reflux disease. The patient is scheduled for sleep study to evaluate  for sleep apnea. The patient underwent uvulopalatopharyngoplasty  surgery in the past.    METHODS:  The patient underwent digital polysomnography in compliance  with the standards and specifications from the AASM Manual including the  simultaneous recording of 3 EEG channels (F4-M1, C4-M1, and O2-M1 with  back up electrodes F3-M2, C3-M2, and O1-M2), 2 EOG channels (E1-M2, and  E2-M1,), EMG (chin, left & right leg), EKG, Nonin pulse oximetry with   less than 2 second averaging time, body position, airflow recorded by  oral-nasal thermal sensor and nasal air pressure transducer, plus  respiratory effort recorded by calibrated respiratory inductance  plethysmography (RIP), flow volume loop, sound and video. Sleep staging  and scoring followed the standard put forth by the American Academy of  Sleep Medicine and utilized the 1B obstructive hypopnea event  desaturation of 4 percent or greater.     INTERPRETATION:  This is a baseline sleep study which was performed to  check the current status of sleep apnea due to history of  uvulopalatopharyngoplasty surgery. The study was performed on  01/08/2023. The study was started at 08:47 p.m. and was terminated at  05:05 a.m. with a total recording time of 498.6 minutes, sleep period  time was 479.3 minutes, total sleep time was 448.3 minutes, and overall  sleep efficiency was 89.9%. The sleep onset latency was 19.3 minutes,  wake after sleep onset was 31 minutes, and REM sleep latency was 276.5  minutes. SLEEP STAGING AND DISTRIBUTION SUMMARY:  Revealed the patient spent 33.5  minutes in stage I consisting of 7.5% of total sleep time, 353.3 minutes  in stage II consisting of 78.8%, 23 minutes in stage III consisting of  5.1%, 38.5 minutes in REM sleep consisting of 8.6% of total sleep time. RESPIRATORY EVENT ANALYSIS:  Revealed the patient had a total of 2  apneas. The 2 were obstructive in nature. The patient had a total of  95 hypopneas, all of them were obstructive in nature. The total number  of apneas and hypopneas recorded during the study was 97 with an  apnea-hypopnea index of 13. The patient's REM sleep apnea-hypopnea  index was 42.1. POSITION ANALYSIS:  Revealed the patient spent 243.5 minutes in supine  position, 53.9 minutes in left lateral position, 150.9 minutes in right  lateral position with a supine apnea-hypopnea index of 11.1 whereas left  lateral position apnea-hypopnea index was 10, and right lateral position  apnea-hypopnea index was 17.1. PERIODIC LIMB MOVEMENT ANALYSIS:  Revealed the patient had a total of 60  periodic limb movements. Out of 60, 12 of them were associated with  arousals with a PLM index of 8. PLM arousal index is 1.6. The patient  had a total of 48 spontaneous arousals with a spontaneous arousal index  of 6.4. OXYGEN SATURATION MONITORING:  Revealed the patient had a maximum oxygen  desaturation to 57% with a mean oxygen saturation of 86.4%.   The patient  spent a total of 397.6 minutes below oxygen saturation less than 88%. The patient found to have mild-to-severe snoring during the sleep study. EKG MONITORING:  Revealed normal sinus rhythm with sinus arrhythmia and  occasional premature ventricular contractions. IMPRESSION:  1. Mild obstructive sleep apnea with worsening of respiratory events in  REM sleep. 2.  Decreased and delayed REM sleep limiting the interpretation of the  sleep study. 3.  Status post uvulopalatopharyngoplasty surgery. 4.  Coronary artery disease, status post angioplasty in 1997.  5.  Depression, on treatment with medications. RECOMMENDATIONS:  1. For the patient's sleep-disordered breathing, the patient needs  treatment. 2.  If the patient chooses to go for CPAP therapy as a treatment, the  patient should be scheduled for in-lab CPAP titration study as soon as  possible. The patient to follow up with  Laurie Friedman PA-C, clinic  in six to eight weeks on recommended CPAP therapy for clinical  reevaluation with review of download. 3.  If the patient wishes to discuss her sleep study reports, the  patient should be scheduled for followup with Laurie Friedman PA-C, my  clinic as soon as possible. Thanks to Laurie Friedman PA-C, and Tu Moctezuma MD, for giving me this  opportunity to participate in the care of this pleasant lady.         Lis Dorantes MD    D: 01/09/2023 17:18:42       T: 01/10/2023 15:29:57     [email or web address]  Job#: [id number]     Doc#: [id number]    CC:

## 2023-01-31 NOTE — PROGRESS NOTES
[facility]    Bacilio Reilly, 69 y.o.  [id number]      Pt of Shelia Turner  Nurses Notes   PSG results  Study Results  Initial Study Date -  1/8/23  AHI -  13.0    TotalEvents - 97  (Apneas  2  Hypopneas 95  Central  0)  LM w/Arousals - 12  Sleep Efficiency - 89.9 % (Total Sleep Time - 448. 3 min)  Time with Sats below 88% - 397.6 min  Interval History       Bacilio Reilly is a 69 y.o. old female who comes in to review the results of her recent sleep study, to answer questions and to explore options for treatment. she continues to have symptoms of snoring, feels sleepy during the day  History of UPPP with tongue suspension  Originally diagnosed with BETSY 20+ years ago and failed PAP  Obese BMI 37  Current smoker   Significant hypoxia with entire test  Patient refusing any BETSY treatment  Does NOT qualify for [facility] treatment    01/08/2023                                SLEEP STUDY REPORT    INTERPRETATION:  This is a baseline sleep study which was performed to  check the current status of sleep apnea due to history of  uvulopalatopharyngoplasty surgery. The study was performed on  01/08/2023. The study was started at 08:47 p.m. and was terminated at  05:05 a.m. with a total recording time of 498.6 minutes, sleep period  time was 479.3 minutes, total sleep time was 448.3 minutes, and overall  sleep efficiency was 89.9%. The sleep onset latency was 19.3 minutes,  wake after sleep onset was 31 minutes, and REM sleep latency was 276.5  minutes. SLEEP STAGING AND DISTRIBUTION SUMMARY:  Revealed the patient spent 33.5  minutes in stage I consisting of 7.5% of total sleep time, 353.3 minutes  in stage II consisting of 78.8%, 23 minutes in stage III consisting of  5.1%, 38.5 minutes in REM sleep consisting of 8.6% of total sleep time. RESPIRATORY EVENT ANALYSIS:  Revealed the patient had a total of 2  apneas. The 2 were obstructive in nature.   The patient had a total of  95 hypopneas, all of them were obstructive in nature. The total number  of apneas and hypopneas recorded during the study was 97 with an  apnea-hypopnea index of 13. The patient's REM sleep apnea-hypopnea  index was 42.1. POSITION ANALYSIS:  Revealed the patient spent 243.5 minutes in supine  position, 53.9 minutes in left lateral position, 150.9 minutes in right  lateral position with a supine apnea-hypopnea index of 11.1 whereas left  lateral position apnea-hypopnea index was 10, and right lateral position  apnea-hypopnea index was 17.1. PERIODIC LIMB MOVEMENT ANALYSIS:  Revealed the patient had a total of 60  periodic limb movements. Out of 60, 12 of them were associated with  arousals with a PLM index of 8. PLM arousal index is 1.6. The patient  had a total of 48 spontaneous arousals with a spontaneous arousal index  of 6.4. OXYGEN SATURATION MONITORING:  Revealed the patient had a maximum oxygen  desaturation to 57% with a mean oxygen saturation of 86.4%. The patient  spent a total of 397.6 minutes below oxygen saturation less than 88%. The patient found to have mild-to-severe snoring during the sleep study. EKG MONITORING:  Revealed normal sinus rhythm with sinus arrhythmia and  occasional premature ventricular contractions. IMPRESSION:  1. Mild obstructive sleep apnea with worsening of respiratory events in  REM sleep. 2.  Decreased and delayed REM sleep limiting the interpretation of the  sleep study. 3.  Status post uvulopalatopharyngoplasty surgery. 4.  Coronary artery disease, status post angioplasty in 1997.  5.  Depression, on treatment with medications.       Allergies  No Known Allergies  Meds  Current Outpatient Medications   Medication Sig Dispense Refill    dilTIAZem (DILACOR XR) 120 MG extended release capsule TAKE ONE CAPSULE EACH DAY TO HELP CONTROL BLOOD PRESSURE AND HEART 90 capsule 3    cyanocobalamin 1000 MCG/ML injection Inject 1 mL into the muscle every 14 days Every 2 wks 2 mL 2    Syringe/Needle, Disp, (SYRINGE 3CC/25GX1\") 25G X 1\" 3 ML MISC 1 mL by Does not apply route every 14 days 2 each 2    rosuvastatin (CRESTOR) 20 MG tablet Take 1 tablet by mouth nightly 90 tablet 1    cyclobenzaprine (FLEXERIL) 5 MG tablet Take 5 mg by mouth 3 times daily as needed for Muscle spasms      raloxifene (EVISTA) 60 MG tablet TAKE ONE TABLET EACH DAY FOR HEART 90 tablet 1    sertraline (ZOLOFT) 100 MG tablet Take one and one-half (1&1/2) tablets by mouth one time daily to help control mood. 135 tablet 1    cetirizine (ZYRTEC) 10 MG tablet TAKE ONE TABLET EACH DAY to help control sinus and allergies **DRINK PLENTY OF WATER** 90 tablet 1    gabapentin (NEURONTIN) 300 MG capsule Take 300 mg by mouth. 2 tabs x 3 daily      ASPIRIN 81 PO Take by mouth daily      Cholecalciferol (VITAMIN D) 125 MCG (5000 UT) CAPS Take by mouth daily      Calcium Carbonate (CALCIUM 600 PO) Take 600 mg by mouth 1 tab x 2 daily      Ascorbic Acid (VITAMIN C) 1000 MG tablet Take 1,000 mg by mouth daily 1 tab x 2 daily      omeprazole (PRILOSEC) 40 MG delayed release capsule Take 1 capsule by mouth daily 1 tab x 2 daily 90 capsule 1    fluticasone (FLONASE) 50 MCG/ACT nasal spray 2 sprays by Each Nostril route daily 48 g 1     No current facility-administered medications for this visit. ROS  Review of Systems   Constitutional:  Positive for fatigue. Negative for chills and fever. HENT: Negative. Negative for congestion. Eyes: Negative. Respiratory: Negative. Negative for cough, shortness of breath and wheezing. Cardiovascular: Negative. Negative for chest pain and leg swelling. Gastrointestinal: Negative. Endocrine: Negative. Genitourinary: Negative. Musculoskeletal: Negative. Allergic/Immunologic: Negative. Neurological: Negative. Hematological: Negative. Psychiatric/Behavioral:  Positive for sleep disturbance.          Exam  Vitals -  /80 (Site: Left Upper Arm, Position: Sitting, Cuff Size: Medium Adult)   Pulse 77   Ht 5' 3\" (1.6 m)   Wt 214 lb 6.4 oz (97.3 kg)   SpO2 96%   BMI 37.98 kg/m²    Body mass index is 37.98 kg/m². Oxygen level - Room Air  Physical Exam  Vitals and nursing note reviewed. Constitutional:       Appearance: She is well-developed. She is obese. HENT:      Head: Normocephalic and atraumatic. Eyes:      Conjunctiva/sclera: Conjunctivae normal.      Pupils: Pupils are equal, round, and reactive to light. Neck:      Vascular: No JVD. Cardiovascular:      Rate and Rhythm: Normal rate and regular rhythm. Heart sounds: Normal heart sounds. No murmur heard. No friction rub. No gallop. Pulmonary:      Effort: Pulmonary effort is normal. No respiratory distress. Breath sounds: Normal breath sounds. No wheezing or rales. Abdominal:      General: Bowel sounds are normal.      Palpations: Abdomen is soft. Musculoskeletal:         General: Normal range of motion. Cervical back: Normal range of motion and neck supple. Skin:     General: Skin is warm and dry. Capillary Refill: Capillary refill takes less than 2 seconds. Neurological:      Mental Status: She is alert and oriented to person, place, and time. Psychiatric:         Behavior: Behavior normal.         Thought Content: Thought content normal.         Judgment: Judgment normal.      Assessment   Diagnosis Orders   1. BETSY (obstructive sleep apnea)  Sleep Study with PAP Titration      2. S/P UPPP (uvulopalatopharyngoplasty)  Sleep Study with PAP Titration      3. Snoring        4. Hypersomnia        5. Severe obesity (BMI 35.0-39. 9) with comorbidity ([address]        6. Nocturnal hypoxia           Recommendations    -PSG reviewed with patient BETSY seen with significant hypoxia  -patient refusing any treatment   -She was educated about all available treatment options for her   obstructive sleep apnea.  She refused to go for any treatment and she is aware of the consequences of her decision including increased risk for cardiovascular and cerebrovascular events and morbidity including death.   -Patient is going to try and lose weight  -Offered pulmonary appt due to her smoking and she declined  -home oxygen will not be covered most likely due to her untreated BETSY  -No PFTs on file  -RTC PRN she will try to lose weight      Electronically signed by SAMRA Griffith - CNP on 2/1/2023 at 9:22 AM

## 2023-02-01 ENCOUNTER — OFFICE VISIT (OUTPATIENT)
Dept: PULMONOLOGY | Age: 68
End: 2023-02-01
Payer: MEDICARE

## 2023-02-01 VITALS
DIASTOLIC BLOOD PRESSURE: 80 MMHG | BODY MASS INDEX: 37.99 KG/M2 | HEIGHT: 63 IN | HEART RATE: 77 BPM | SYSTOLIC BLOOD PRESSURE: 132 MMHG | WEIGHT: 214.4 LBS | OXYGEN SATURATION: 96 %

## 2023-02-01 DIAGNOSIS — G47.10 HYPERSOMNIA: ICD-10-CM

## 2023-02-01 DIAGNOSIS — Z98.890 S/P UPPP (UVULOPALATOPHARYNGOPLASTY): ICD-10-CM

## 2023-02-01 DIAGNOSIS — R06.83 SNORING: ICD-10-CM

## 2023-02-01 DIAGNOSIS — E66.01 SEVERE OBESITY (BMI 35.0-39.9) WITH COMORBIDITY (HCC): ICD-10-CM

## 2023-02-01 DIAGNOSIS — G47.33 OSA (OBSTRUCTIVE SLEEP APNEA): Primary | ICD-10-CM

## 2023-02-01 DIAGNOSIS — G47.34 NOCTURNAL HYPOXIA: ICD-10-CM

## 2023-02-01 PROCEDURE — G8484 FLU IMMUNIZE NO ADMIN: HCPCS | Performed by: NURSE PRACTITIONER

## 2023-02-01 PROCEDURE — 4004F PT TOBACCO SCREEN RCVD TLK: CPT | Performed by: NURSE PRACTITIONER

## 2023-02-01 PROCEDURE — 99214 OFFICE O/P EST MOD 30 MIN: CPT | Performed by: NURSE PRACTITIONER

## 2023-02-01 PROCEDURE — G8399 PT W/DXA RESULTS DOCUMENT: HCPCS | Performed by: NURSE PRACTITIONER

## 2023-02-01 PROCEDURE — G8417 CALC BMI ABV UP PARAM F/U: HCPCS | Performed by: NURSE PRACTITIONER

## 2023-02-01 PROCEDURE — 1090F PRES/ABSN URINE INCON ASSESS: CPT | Performed by: NURSE PRACTITIONER

## 2023-02-01 PROCEDURE — 3017F COLORECTAL CA SCREEN DOC REV: CPT | Performed by: NURSE PRACTITIONER

## 2023-02-01 PROCEDURE — G8427 DOCREV CUR MEDS BY ELIG CLIN: HCPCS | Performed by: NURSE PRACTITIONER

## 2023-02-01 PROCEDURE — 1123F ACP DISCUSS/DSCN MKR DOCD: CPT | Performed by: NURSE PRACTITIONER

## 2023-02-01 ASSESSMENT — ENCOUNTER SYMPTOMS
COUGH: 0
RESPIRATORY NEGATIVE: 1
ALLERGIC/IMMUNOLOGIC NEGATIVE: 1
SHORTNESS OF BREATH: 0
EYES NEGATIVE: 1
GASTROINTESTINAL NEGATIVE: 1
WHEEZING: 0

## 2023-02-13 ENCOUNTER — OFFICE VISIT (OUTPATIENT)
Dept: FAMILY MEDICINE CLINIC | Age: 68
End: 2023-02-13
Payer: MEDICARE

## 2023-02-13 VITALS
OXYGEN SATURATION: 97 % | RESPIRATION RATE: 16 BRPM | DIASTOLIC BLOOD PRESSURE: 88 MMHG | WEIGHT: 215 LBS | HEART RATE: 78 BPM | HEIGHT: 63 IN | BODY MASS INDEX: 38.09 KG/M2 | SYSTOLIC BLOOD PRESSURE: 130 MMHG | TEMPERATURE: 97.1 F

## 2023-02-13 DIAGNOSIS — R25.2 LEG CRAMPING: ICD-10-CM

## 2023-02-13 DIAGNOSIS — F17.210 CIGARETTE SMOKER MOTIVATED TO QUIT: ICD-10-CM

## 2023-02-13 DIAGNOSIS — E55.9 VITAMIN D DEFICIENCY: ICD-10-CM

## 2023-02-13 DIAGNOSIS — E53.8 VITAMIN B12 DEFICIENCY: ICD-10-CM

## 2023-02-13 DIAGNOSIS — F32.A ANXIETY AND DEPRESSION: ICD-10-CM

## 2023-02-13 DIAGNOSIS — M85.80 OSTEOPENIA, UNSPECIFIED LOCATION: ICD-10-CM

## 2023-02-13 DIAGNOSIS — F41.9 ANXIETY AND DEPRESSION: ICD-10-CM

## 2023-02-13 DIAGNOSIS — I25.10 CORONARY ARTERY DISEASE INVOLVING NATIVE CORONARY ARTERY OF NATIVE HEART WITHOUT ANGINA PECTORIS: Primary | ICD-10-CM

## 2023-02-13 PROCEDURE — 1090F PRES/ABSN URINE INCON ASSESS: CPT | Performed by: FAMILY MEDICINE

## 2023-02-13 PROCEDURE — 3017F COLORECTAL CA SCREEN DOC REV: CPT | Performed by: FAMILY MEDICINE

## 2023-02-13 PROCEDURE — 4004F PT TOBACCO SCREEN RCVD TLK: CPT | Performed by: FAMILY MEDICINE

## 2023-02-13 PROCEDURE — 1123F ACP DISCUSS/DSCN MKR DOCD: CPT | Performed by: FAMILY MEDICINE

## 2023-02-13 PROCEDURE — G8484 FLU IMMUNIZE NO ADMIN: HCPCS | Performed by: FAMILY MEDICINE

## 2023-02-13 PROCEDURE — G8427 DOCREV CUR MEDS BY ELIG CLIN: HCPCS | Performed by: FAMILY MEDICINE

## 2023-02-13 PROCEDURE — 99214 OFFICE O/P EST MOD 30 MIN: CPT | Performed by: FAMILY MEDICINE

## 2023-02-13 PROCEDURE — G8399 PT W/DXA RESULTS DOCUMENT: HCPCS | Performed by: FAMILY MEDICINE

## 2023-02-13 PROCEDURE — G8417 CALC BMI ABV UP PARAM F/U: HCPCS | Performed by: FAMILY MEDICINE

## 2023-02-13 RX ORDER — RALOXIFENE HYDROCHLORIDE 60 MG/1
TABLET, FILM COATED ORAL
Qty: 90 TABLET | Refills: 1 | Status: SHIPPED | OUTPATIENT
Start: 2023-02-13

## 2023-02-13 RX ORDER — BUPROPION HYDROCHLORIDE 150 MG/1
150 TABLET ORAL EVERY MORNING
Qty: 30 TABLET | Refills: 3 | Status: SHIPPED | OUTPATIENT
Start: 2023-02-13

## 2023-02-13 RX ORDER — CYANOCOBALAMIN 1000 UG/ML
1000 INJECTION, SOLUTION INTRAMUSCULAR; SUBCUTANEOUS
Qty: 2 ML | Refills: 2 | Status: SHIPPED | OUTPATIENT
Start: 2023-02-13

## 2023-02-13 RX ORDER — SYRINGE W-NEEDLE,DISPOSAB,3 ML 25GX5/8"
1 SYRINGE, EMPTY DISPOSABLE MISCELLANEOUS
Qty: 2 EACH | Refills: 2 | Status: SHIPPED | OUTPATIENT
Start: 2023-02-13

## 2023-02-13 RX ORDER — CETIRIZINE HYDROCHLORIDE 10 MG/1
TABLET ORAL
Qty: 90 TABLET | Refills: 1 | Status: SHIPPED | OUTPATIENT
Start: 2023-02-13

## 2023-02-13 RX ORDER — OMEPRAZOLE 40 MG/1
40 CAPSULE, DELAYED RELEASE ORAL DAILY
Qty: 90 CAPSULE | Refills: 1 | Status: SHIPPED | OUTPATIENT
Start: 2023-02-13

## 2023-02-13 SDOH — ECONOMIC STABILITY: FOOD INSECURITY: WITHIN THE PAST 12 MONTHS, THE FOOD YOU BOUGHT JUST DIDN'T LAST AND YOU DIDN'T HAVE MONEY TO GET MORE.: NEVER TRUE

## 2023-02-13 SDOH — ECONOMIC STABILITY: FOOD INSECURITY: WITHIN THE PAST 12 MONTHS, YOU WORRIED THAT YOUR FOOD WOULD RUN OUT BEFORE YOU GOT MONEY TO BUY MORE.: NEVER TRUE

## 2023-02-13 SDOH — ECONOMIC STABILITY: INCOME INSECURITY: HOW HARD IS IT FOR YOU TO PAY FOR THE VERY BASICS LIKE FOOD, HOUSING, MEDICAL CARE, AND HEATING?: NOT VERY HARD

## 2023-02-13 SDOH — ECONOMIC STABILITY: HOUSING INSECURITY
IN THE LAST 12 MONTHS, WAS THERE A TIME WHEN YOU DID NOT HAVE A STEADY PLACE TO SLEEP OR SLEPT IN A SHELTER (INCLUDING NOW)?: NO

## 2023-02-13 ASSESSMENT — PATIENT HEALTH QUESTIONNAIRE - PHQ9
9. THOUGHTS THAT YOU WOULD BE BETTER OFF DEAD, OR OF HURTING YOURSELF: 0
SUM OF ALL RESPONSES TO PHQ QUESTIONS 1-9: 5
5. POOR APPETITE OR OVEREATING: 0
3. TROUBLE FALLING OR STAYING ASLEEP: 0
7. TROUBLE CONCENTRATING ON THINGS, SUCH AS READING THE NEWSPAPER OR WATCHING TELEVISION: 0
SUM OF ALL RESPONSES TO PHQ QUESTIONS 1-9: 5
4. FEELING TIRED OR HAVING LITTLE ENERGY: 3
10. IF YOU CHECKED OFF ANY PROBLEMS, HOW DIFFICULT HAVE THESE PROBLEMS MADE IT FOR YOU TO DO YOUR WORK, TAKE CARE OF THINGS AT HOME, OR GET ALONG WITH OTHER PEOPLE: 0
8. MOVING OR SPEAKING SO SLOWLY THAT OTHER PEOPLE COULD HAVE NOTICED. OR THE OPPOSITE, BEING SO FIGETY OR RESTLESS THAT YOU HAVE BEEN MOVING AROUND A LOT MORE THAN USUAL: 0
6. FEELING BAD ABOUT YOURSELF - OR THAT YOU ARE A FAILURE OR HAVE LET YOURSELF OR YOUR FAMILY DOWN: 1
2. FEELING DOWN, DEPRESSED OR HOPELESS: 1

## 2023-02-13 NOTE — PROGRESS NOTES
[address] [facility] [address] [facility], [address]  Ph:   [phone]  Fax: [phone]    Provider:  Dr. Edgardo Felty Note    Patient:  Mara Oreilly  YOB: 1955      Visit Date:  2/13/2023                                              Reason For Visit:   Chief Complaint   Patient presents with    6 Month Follow-Up        Salma Conner is a 69 y.o. female comes today to the office because of follow-up of osteoporosis, CAD, depression, vitamin B 12 deficiency. Coronary artery disease:  status post angioplasty in 1997. She is taking Crestor 20 mg 1 tablet PO daily. She denies any side effects from the medication. She is taking Cardizem and a baby aspirin a day. She denies any chest pain, shortness of breath, edema lower extremities. She is followed by Cardiology, Dr. Ramila Aponte.    Last visit in November 2022. Stress test 06/2022 was negative for ischemia. Echo 6/2022 revealed preserved EF, grade I DD, Mild AI. Osteoporosis: She is taking Evista 60 mg 1 tablet mouth daily, vitamin D 5000 international units every day and calcium carbonate 600 mg 2 tablets a day. Last bone density from December 2021 showed  osteopenia based on the World Health Organization criteria    Depression: She is taking Zoloft 100 mg 1-1/2 tablet for a total dose of 150 mg every day. She is stable. Denies any side effect from the medication, but she is concerned about weight gain on it, about 21 pounds in the last couple of years. Idiopathic peripheral neuropathy:  She has it in both legs but more prominent on the right leg. She also have history of vitamin B12 deficiency. She is taking Neurontin 300 mg 2 caps TID, Celebrex 200 mg 1 tablet daily. She is followed by a pain management physician and he refills all these medications. GERD: She is taking Prilosec 40 mg 1 tablet mouth daily and her symptoms are well controlled. Vitamin B 12 deficiency:  she was diagnosed about 10 years ago. She has been using IM replacement since then. Last time it was checked was in November 2021 and it was 710. Hx of vitamin D deficiency:  she is taking 5000 IU of vitamin D OTC. Needs level checked. Significant Past Medical History:      Past Medical History:   Diagnosis Date    Age-related osteoporosis without current pathological fracture 2015    Coronary artery disease involving native coronary artery of native heart without angina pectoris 1997    s/p angioplasty 1997    Depression 2015    GERD without esophagitis 2006    Idiopathic peripheral neuropathy 2004    Lumbar degenerative disc disease 2015    Sleep apnea 2000    uvulectomy/oral surgery    Vitamin B12 deficiency 2004         Health Maintenance Due   Topic Date Due    Hepatitis C screen  Never done    Colorectal Cancer Screen  Never done    Pneumococcal 65+ years Vaccine (3 - PPSV23 if available, else PCV20) 06/30/2021    Annual Wellness Visit (AWV)  Never done    COVID-19 Vaccine (4 - Booster for Pfizer series) 12/10/2021    Flu vaccine (1) 08/01/2022       Allergies:  has No Known Allergies.     Medications:   Current Outpatient Medications   Medication Sig Dispense Refill    cetirizine (ZYRTEC) 10 MG tablet TAKE ONE TABLET EACH DAY to help control sinus and allergies **DRINK PLENTY OF WATER** 90 tablet 1    omeprazole (PRILOSEC) 40 MG delayed release capsule Take 1 capsule by mouth daily 1 tab x 2 daily 90 capsule 1    raloxifene (EVISTA) 60 MG tablet TAKE ONE TABLET EACH DAY FOR HEART 90 tablet 1    cyanocobalamin 1000 MCG/ML injection Inject 1 mL into the muscle every 14 days Every 2 wks 2 mL 2    Syringe/Needle, Disp, (SYRINGE 3CC/25GX1\") 25G X 1\" 3 ML MISC 1 mL by Does not apply route every 14 days 2 each 2    buPROPion (WELLBUTRIN XL) 150 MG extended release tablet Take 1 tablet by mouth every morning 30 tablet 3    dilTIAZem (DILACOR XR) 120 MG extended release capsule TAKE ONE CAPSULE EACH DAY TO HELP CONTROL BLOOD PRESSURE AND HEART 90 capsule 3    rosuvastatin (CRESTOR) 20 MG tablet Take 1 tablet by mouth nightly 90 tablet 1    cyclobenzaprine (FLEXERIL) 5 MG tablet Take 5 mg by mouth 3 times daily as needed for Muscle spasms      sertraline (ZOLOFT) 100 MG tablet Take one and one-half (1&1/2) tablets by mouth one time daily to help control mood. 135 tablet 1    gabapentin (NEURONTIN) 300 MG capsule Take 300 mg by mouth. 2 tabs x 3 daily      ASPIRIN 81 PO Take by mouth daily      Cholecalciferol (VITAMIN D) 125 MCG (5000 UT) CAPS Take by mouth daily      Calcium Carbonate (CALCIUM 600 PO) Take 600 mg by mouth 1 tab x 2 daily      Ascorbic Acid (VITAMIN C) 1000 MG tablet Take 1,000 mg by mouth daily 1 tab x 2 daily      fluticasone (FLONASE) 50 MCG/ACT nasal spray 2 sprays by Each Nostril route daily 48 g 1     No current facility-administered medications for this visit. Review of systems:  Review of Systems - History obtained from the patient  General ROS: negative for - chills, fatigue or fever  Psychological ROS: negative for - anxiety, depression or sleep disturbances  ENT ROS: positive for - post nasal drip, much better. Headache basically resolved  Allergy and Immunology ROS: negative for - seasonal allergies  Hematological and Lymphatic ROS: negative for - bruising  Endocrine ROS: negative for - malaise/lethargy, polydipsia/polyuria or temperature intolerance  Respiratory ROS: negative for - cough,  shortness of breath or wheezing  Cardiovascular ROS: negative for - chest pain or edema  Gastrointestinal ROS: negative for - abdominal pain, constipation, diarrhea or nausea/vomiting  Musculoskeletal ROS: positive for -  muscle pain, leg pain, nack pain  Neurological ROS: negative for - dizziness      Physical Examination:  Blood pressure 130/88, pulse 78, temperature 97.1 °F (36.2 °C), temperature source Skin, resp. rate 16, height 5' 3\" (1.6 m), weight 215 lb (97.5 kg), SpO2 97 %.     General-  Alert and oriented x 3, NAD  HEENT: NC, AT, PERRLA, EOMI, anicteric sclerae  Ears: Normal tympanic membranes bilaterally  Nose: swollen turbinates, clear drainage  Mouth: no lesions, moist mucosas  Neck - supple, no significant adenopathy. Pain distribution is trapezius and occipital muscles   Chest - clear to auscultation, no wheezes, rales or rhonchi, symmetric air entry  Heart - normal rate, regular rhythm, normal S1, S2, no murmurs, rubs, clicks or gallops  Abdomen - soft, nontender, nondistended, no masses or organomegaly  Extremities - no pedal edema, no clubbing or cyanosis      Impression:   Diagnosis Orders   1. Coronary artery disease involving native coronary artery of native heart without angina pectoris  Lipid Panel    Urinalysis with Microscopic    Vitamin D 25 Hydroxy      2. Vitamin B12 deficiency  cyanocobalamin 1000 MCG/ML injection    Syringe/Needle, Disp, (SYRINGE 3CC/25GX1\") 25G X 1\" 3 ML MISC    Vitamin B12    Vitamin D 25 Hydroxy      3. Anxiety and depression  Urinalysis with Microscopic    Vitamin D 25 Hydroxy      4. Osteopenia, unspecified location  Vitamin D 25 Hydroxy      5. Cigarette smoker motivated to quit        6. Leg cramping  CBC with Auto Differential    Comprehensive Metabolic Panel    TSH    Vitamin B12    Vitamin D 25 Hydroxy      7. Vitamin D deficiency              Plan:  Continue Crestor 10 mg 1 tablet PO daily. She did not tolerate Lipitor. Continue Cardizem 120 mg 1 extended release capsules daily. Continue Sertraline  100 mg 1 1/2 tablet p.o. daily. Continue Prilosec 40 mg 1 tablet mouth daily. Continue Evista 60 mg 1 tab mouth daily. Continue Celebrex and Flexeril as per her pain management physician that she plans to continue seeing. Continue Flonase nasal spray 2 sprays each nostril once a day  Continue Calcium carbonate 600 mg 1 tablet mouth twice a day. Continue Vitamin D 5000 IU every day.       Orders Placed This Encounter   Procedures    Lipid Panel     Standing Status:   Future Standing Expiration Date:   2/13/2024     Order Specific Question:   Is Patient Fasting?/# of Hours     Answer:   12 hours    CBC with Auto Differential     Standing Status:   Future     Standing Expiration Date:   2/13/2024    Comprehensive Metabolic Panel     Standing Status:   Future     Standing Expiration Date:   2/13/2024    TSH     Standing Status:   Future     Standing Expiration Date:   2/13/2024    Urinalysis with Microscopic     Standing Status:   Future     Standing Expiration Date:   2/13/2024     Order Specific Question:   SPECIFY(EX-CATH,MIDSTREAM,CYSTO,ETC)? Answer:   midstream    Vitamin B12     Standing Status:   Future     Standing Expiration Date:   2/13/2024    Vitamin D 25 Hydroxy     Standing Status:   Future     Standing Expiration Date:   2/13/2024           Follow Up:  Return in about 4 weeks (around 3/13/2023).     Grery Ruggiero MD

## 2023-02-14 ENCOUNTER — NURSE ONLY (OUTPATIENT)
Dept: LAB | Age: 68
End: 2023-02-14

## 2023-02-14 DIAGNOSIS — I25.10 CORONARY ARTERY DISEASE INVOLVING NATIVE CORONARY ARTERY OF NATIVE HEART WITHOUT ANGINA PECTORIS: ICD-10-CM

## 2023-02-14 DIAGNOSIS — F32.A ANXIETY AND DEPRESSION: ICD-10-CM

## 2023-02-14 DIAGNOSIS — R25.2 LEG CRAMPING: ICD-10-CM

## 2023-02-14 DIAGNOSIS — E53.8 VITAMIN B12 DEFICIENCY: ICD-10-CM

## 2023-02-14 DIAGNOSIS — F41.9 ANXIETY AND DEPRESSION: ICD-10-CM

## 2023-02-14 DIAGNOSIS — M85.80 OSTEOPENIA, UNSPECIFIED LOCATION: ICD-10-CM

## 2023-02-14 LAB
25(OH)D3 SERPL-MCNC: 29 NG/ML (ref 30–100)
ALBUMIN SERPL BCG-MCNC: 4.1 G/DL (ref 3.5–5.1)
ALP SERPL-CCNC: 103 U/L (ref 38–126)
ALT SERPL W/O P-5'-P-CCNC: 17 U/L (ref 11–66)
AMORPH SED URNS QL MICRO: ABNORMAL
ANION GAP SERPL CALC-SCNC: 11 MEQ/L (ref 8–16)
AST SERPL-CCNC: 19 U/L (ref 5–40)
BACTERIA: ABNORMAL
BASOPHILS ABSOLUTE: 0.1 THOU/MM3 (ref 0–0.1)
BASOPHILS NFR BLD AUTO: 1.2 %
BILIRUB SERPL-MCNC: 0.3 MG/DL (ref 0.3–1.2)
BILIRUB UR QL STRIP: NEGATIVE
BUN SERPL-MCNC: 8 MG/DL (ref 7–22)
CALCIUM SERPL-MCNC: 9 MG/DL (ref 8.5–10.5)
CASTS #/AREA URNS LPF: ABNORMAL /LPF
CHARACTER UR: ABNORMAL
CHLORIDE SERPL-SCNC: 102 MEQ/L (ref 98–111)
CHOLEST SERPL-MCNC: 186 MG/DL (ref 100–199)
CO2 SERPL-SCNC: 29 MEQ/L (ref 23–33)
COLOR UR: YELLOW
CREAT SERPL-MCNC: 0.5 MG/DL (ref 0.4–1.2)
CRYSTALS URNS QL MICRO: ABNORMAL
DEPRECATED RDW RBC AUTO: 50.2 FL (ref 35–45)
EOSINOPHIL NFR BLD AUTO: 2.6 %
EOSINOPHILS ABSOLUTE: 0.2 THOU/MM3 (ref 0–0.4)
EPITHELIAL CELLS, UA: ABNORMAL /HPF
ERYTHROCYTE [DISTWIDTH] IN BLOOD BY AUTOMATED COUNT: 14 % (ref 11.5–14.5)
GFR SERPL CREATININE-BSD FRML MDRD: > 60 ML/MIN/1.73M2
GLUCOSE SERPL-MCNC: 128 MG/DL (ref 70–108)
GLUCOSE UR QL STRIP.AUTO: NEGATIVE MG/DL
HCT VFR BLD AUTO: 44 % (ref 37–47)
HDLC SERPL-MCNC: 65 MG/DL
HGB BLD-MCNC: 14.1 GM/DL (ref 12–16)
HGB UR QL STRIP.AUTO: ABNORMAL
IMM GRANULOCYTES # BLD AUTO: 0.02 THOU/MM3 (ref 0–0.07)
IMM GRANULOCYTES NFR BLD AUTO: 0.3 %
KETONES UR QL STRIP.AUTO: ABNORMAL
LDLC SERPL CALC-MCNC: 99 MG/DL
LEUKOCYTE ESTERASE UR QL STRIP.AUTO: ABNORMAL
LYMPHOCYTES ABSOLUTE: 1.6 THOU/MM3 (ref 1–4.8)
LYMPHOCYTES NFR BLD AUTO: 27.5 %
MCH RBC QN AUTO: 30.9 PG (ref 26–33)
MCHC RBC AUTO-ENTMCNC: 32 GM/DL (ref 32.2–35.5)
MCV RBC AUTO: 96.5 FL (ref 81–99)
MONOCYTES ABSOLUTE: 0.3 THOU/MM3 (ref 0.4–1.3)
MONOCYTES NFR BLD AUTO: 5.5 %
MUCOUS THREADS URNS QL MICRO: ABNORMAL
NEUTROPHILS NFR BLD AUTO: 62.9 %
NITRITE UR QL STRIP.AUTO: NEGATIVE
NRBC BLD AUTO-RTO: 0 /100 WBC
PH UR STRIP.AUTO: 7.5 [PH] (ref 5–9)
PLATELET # BLD AUTO: 272 THOU/MM3 (ref 130–400)
PMV BLD AUTO: 10.9 FL (ref 9.4–12.4)
POTASSIUM SERPL-SCNC: 4 MEQ/L (ref 3.5–5.2)
PROT SERPL-MCNC: 6.6 G/DL (ref 6.1–8)
PROT UR STRIP.AUTO-MCNC: ABNORMAL MG/DL
RBC # BLD AUTO: 4.56 MILL/MM3 (ref 4.2–5.4)
RBC #/AREA URNS HPF: ABNORMAL /HPF
SEGMENTED NEUTROPHILS ABSOLUTE COUNT: 3.7 THOU/MM3 (ref 1.8–7.7)
SODIUM SERPL-SCNC: 142 MEQ/L (ref 135–145)
SPECIFIC GRAVITY UA: 1.02 (ref 1–1.03)
TRIGL SERPL-MCNC: 109 MG/DL (ref 0–199)
TSH SERPL DL<=0.005 MIU/L-ACNC: 1.52 UIU/ML (ref 0.4–4.2)
UROBILINOGEN, URINE: 1 EU/DL (ref 0–1)
VIT B12 SERPL-MCNC: 727 PG/ML (ref 211–911)
WBC # BLD AUTO: 5.9 THOU/MM3 (ref 4.8–10.8)
WBC #/AREA URNS HPF: ABNORMAL /HPF

## 2023-02-24 ENCOUNTER — PATIENT MESSAGE (OUTPATIENT)
Dept: FAMILY MEDICINE CLINIC | Age: 68
End: 2023-02-24

## 2023-02-24 DIAGNOSIS — R73.9 HYPERGLYCEMIA: ICD-10-CM

## 2023-02-24 DIAGNOSIS — M79.10 MYALGIA: Primary | ICD-10-CM

## 2023-02-27 NOTE — TELEPHONE ENCOUNTER
From: Susie Dumont  To: Dr. Nelli Shore: 2/24/2023 12:02 PM EST  Subject: Muscles in legs and arms hurting     I just wanted to know if my blood tests showed what was causing my problems. My arms and legs hurt really bad. I dont know what to do for them.  Thank You for your time

## 2023-02-28 ENCOUNTER — TELEPHONE (OUTPATIENT)
Dept: FAMILY MEDICINE CLINIC | Age: 68
End: 2023-02-28

## 2023-02-28 ENCOUNTER — NURSE ONLY (OUTPATIENT)
Dept: LAB | Age: 68
End: 2023-02-28

## 2023-02-28 DIAGNOSIS — R73.9 HYPERGLYCEMIA: ICD-10-CM

## 2023-02-28 DIAGNOSIS — M79.10 MYALGIA: ICD-10-CM

## 2023-02-28 LAB
DEPRECATED MEAN GLUCOSE BLD GHB EST-ACNC: 123 MG/DL (ref 70–126)
ERYTHROCYTE [SEDIMENTATION RATE] IN BLOOD BY WESTERGREN METHOD: 7 MM/HR (ref 0–20)
HBA1C MFR BLD HPLC: 6.1 % (ref 4.4–6.4)

## 2023-02-28 NOTE — TELEPHONE ENCOUNTER
----- Message from Emmy Espinoza MD sent at 2/28/2023  4:12 PM EST -----  Everything seems acceptable. The only abnormality is th vitamin D. No other reason for her symptoms. Take the vitamin D and follow up in 2 months.   If the smptoms worsen come back earlier

## 2023-03-06 RX ORDER — ROSUVASTATIN CALCIUM 20 MG/1
TABLET, COATED ORAL
Qty: 90 TABLET | Refills: 3 | Status: SHIPPED | OUTPATIENT
Start: 2023-03-06

## 2023-03-06 NOTE — TELEPHONE ENCOUNTER
Patient's last appointment was : 2/13/2023  Patient's next appointment is :  3/17/2023  Last refilled: 12/1/2022   Pharmacy Verified    Lab Results   Component Value Date    LABA1C 6.1 02/28/2023     Lab Results   Component Value Date    CHOL 186 02/14/2023    TRIG 109 02/14/2023    HDL 65 02/14/2023    LDLCALC 99 02/14/2023     Lab Results   Component Value Date     02/14/2023    K 4.0 02/14/2023     02/14/2023    CO2 29 02/14/2023    BUN 8 02/14/2023    CREATININE 0.5 02/14/2023    GLUCOSE 128 (H) 02/14/2023    CALCIUM 9.0 02/14/2023    PROT 6.6 02/14/2023    LABALBU 4.1 02/14/2023    BILITOT 0.3 02/14/2023    ALKPHOS 103 02/14/2023    AST 19 02/14/2023    ALT 17 02/14/2023    LABGLOM >60 02/14/2023     Lab Results   Component Value Date    TSH 1.520 02/14/2023     Lab Results   Component Value Date    WBC 5.9 02/14/2023    HGB 14.1 02/14/2023    HCT 44.0 02/14/2023    MCV 96.5 02/14/2023     02/14/2023

## 2023-03-17 ENCOUNTER — TELEMEDICINE (OUTPATIENT)
Dept: FAMILY MEDICINE CLINIC | Age: 68
End: 2023-03-17

## 2023-03-17 DIAGNOSIS — F41.9 ANXIETY AND DEPRESSION: ICD-10-CM

## 2023-03-17 DIAGNOSIS — R73.03 PRE-DIABETES: Primary | ICD-10-CM

## 2023-03-17 DIAGNOSIS — E55.9 VITAMIN D DEFICIENCY: ICD-10-CM

## 2023-03-17 DIAGNOSIS — F32.A ANXIETY AND DEPRESSION: ICD-10-CM

## 2023-03-17 RX ORDER — BUPROPION HYDROCHLORIDE 300 MG/1
300 TABLET ORAL EVERY MORNING
Qty: 30 TABLET | Refills: 3 | Status: SHIPPED | OUTPATIENT
Start: 2023-03-17

## 2023-03-17 RX ORDER — ALBUTEROL SULFATE 90 UG/1
2 AEROSOL, METERED RESPIRATORY (INHALATION) EVERY 6 HOURS PRN
Qty: 18 G | Refills: 5 | Status: SHIPPED | OUTPATIENT
Start: 2023-03-17

## 2023-03-17 NOTE — PROGRESS NOTES
3/17/2023    TELEHEALTH EVALUATION -- Audio/Visual (During WGFVB-54 public health emergency)    HPI:    Mario Alberto Fernandez (:  1955) has requested an audio/video evaluation for the following concern(s): blood test results and follow-up on depression-anxiety. The visit was a started virtually but we got disconnected pretty quick and I had to turn it as a phone call visit. Last visit I started her Wellbutrin  mg every day and continue Zoloft 100 mg 1 1/2 tablet every day. The change was made to help her to lose weight and improve her motivation. She feels she is doing better but would like to increase the Wellbutrin. One concern today is cough for 1 week. It is started with clear phlegm which have currently to be yellow. She does have some congestion, postnasal drainage. She has been taking Mucinex 1200 mg every 12 hours and Flonase nasal spray. Couple days ago she started wheezing a little bit. Ms. Asia Mills requested this visit to discuss blood test results. Her vitamin D was 29. She was taking 5000 international units of vitamin D3 over-the-counter and this has been increased to 10,000 international units every day. TSH 1.520, normal comprehensive panel except for blood sugar 128. Subsequent hemoglobin A1c was 6.1%. Hemoglobin 14, hematocrit 44, platelets 616, total cholesterol 186, triglycerides 109, HDL 65, LDL 90. Prior to Visit Medications    Medication Sig Taking?  Authorizing Provider   buPROPion (WELLBUTRIN XL) 300 MG extended release tablet Take 1 tablet by mouth every morning Yes Marychuy Scruggs MD   albuterol sulfate HFA (VENTOLIN HFA) 108 (90 Base) MCG/ACT inhaler Inhale 2 puffs into the lungs every 6 hours as needed for Wheezing Yes Marychuy Scruggs MD   rosuvastatin (CRESTOR) 20 MG tablet TAKE ONE TABLET AT BEDTIME TO HELP LOWER CHOLESTEROL  Marychuy Scruggs MD   cetirizine (ZYRTEC) 10 MG tablet TAKE ONE TABLET EACH DAY to help control sinus and allergies **DRINK PLENTY OF WATER** Carmen Sigala MD   omeprazole (PRILOSEC) 40 MG delayed release capsule Take 1 capsule by mouth daily 1 tab x 2 daily  Carmen Sigala MD   raloxifene (EVISTA) 60 MG tablet TAKE ONE TABLET EACH DAY FOR HEART  Carmen Sigala MD   cyanocobalamin 1000 MCG/ML injection Inject 1 mL into the muscle every 14 days Every 2 wks  Carmen Sigala MD   Syringe/Needle, Disp, (SYRINGE 3CC/25GX1\") 25G X 1\" 3 ML MISC 1 mL by Does not apply route every 14 days  Carmen Sigala MD   dilTIAZem (DILACOR XR) 120 MG extended release capsule TAKE ONE CAPSULE EACH DAY TO HELP CONTROL BLOOD PRESSURE AND HEART  Rodriguez Elder MD   cyclobenzaprine (FLEXERIL) 5 MG tablet Take 5 mg by mouth 3 times daily as needed for Muscle spasms  Historical Provider, MD   sertraline (ZOLOFT) 100 MG tablet Take one and one-half (1&1/2) tablets by mouth one time daily to help control mood. Carmen Sigala MD   gabapentin (NEURONTIN) 300 MG capsule Take 300 mg by mouth.  2 tabs x 3 daily  Historical Provider, MD   ASPIRIN 81 PO Take by mouth daily  Historical Provider, MD   Cholecalciferol (VITAMIN D) 125 MCG (5000 UT) CAPS Take by mouth daily  Historical Provider, MD   Calcium Carbonate (CALCIUM 600 PO) Take 600 mg by mouth 1 tab x 2 daily  Historical Provider, MD   Ascorbic Acid (VITAMIN C) 1000 MG tablet Take 1,000 mg by mouth daily 1 tab x 2 daily  Historical Provider, MD   fluticasone (FLONASE) 50 MCG/ACT nasal spray 2 sprays by Each Nostril route daily  Carmen Sigala MD       Social History     Tobacco Use    Smoking status: Every Day     Packs/day: 0.50     Years: 21.00     Pack years: 10.50     Types: Cigarettes    Smokeless tobacco: Never    Tobacco comments:     <0.5 ppd 2/1   Vaping Use    Vaping Use: Never used   Substance Use Topics    Alcohol use: Not Currently    Drug use: Not Currently        Past Medical History:   Diagnosis Date    Age-related osteoporosis without current pathological fracture 2015    Coronary artery disease involving native coronary artery of native heart without angina pectoris 1997    s/p angioplasty 1997    Depression 2015    GERD without esophagitis 2006    Idiopathic peripheral neuropathy 2004    Lumbar degenerative disc disease 2015    Sleep apnea 2000    uvulectomy/oral surgery    Vitamin B12 deficiency 2004       PHYSICAL EXAMINATION:    Constitutional: [x] Appears well-developed and well-nourished [x] No apparent distress       Mental status  [x] Alert and awake  [x] Oriented to person/place/time [x]Able to follow commands        Pulmonary/Chest: [x] Respiratory effort normal.               Psychiatric:       [x] Normal Affect     ASSESSMENT/PLAN:    1. Pre-diabetes  We have a lengthy discussion about starches, carbohydrates and foods high in glycemic index. Repeat hemoglobin A1c in 3 months.  - Hemoglobin A1C; Future    2. Anxiety and depression  Will increase Wellbutrin to 300 mg 1 tablet mouth daily. Decrease Zoloft to 100 mg 1 tablet mouth daily. 3. Vitamin D deficiency  Continue vitamin D 10,000 international units every day. Repeat vitamin D in 3 months.  - Vitamin D 25 Hydroxy; Future    Return in about 3 months (around 6/17/2023). Zoran Rooney, was evaluated through a synchronous (real-time) audio-video encounter. The patient (or guardian if applicable) is aware that this is a billable service, which includes applicable co-pays. This Virtual Visit was conducted with patient's (and/or legal guardian's) consent. The visit was conducted pursuant to the emergency declaration under the [address] [place] [address], [address] [place] [address] authority and the [employer] and [employer] General Act. Patient identification was verified, and a caregiver was present when appropriate. The patient was located at Home: [address] [facility] ([place], [place]).   [address] [place] [address]  Provider was located at Home ([address]: [place]        Total time spent on this encounter:  MD Arthur on 3/18/2023 at 12:38 PM    An electronic signature was used to authenticate this note.

## 2023-04-05 ENCOUNTER — PATIENT MESSAGE (OUTPATIENT)
Dept: FAMILY MEDICINE CLINIC | Age: 68
End: 2023-04-05

## 2023-04-05 NOTE — TELEPHONE ENCOUNTER
From: Cullen Fontenot  To: Dr. García Killer: 4/5/2023 4:52 PM EDT  Subject: My feet, ankles and legs have been swelling for 3 days. I have elevated them and they go down a little but not all the way. When I get up in the morning they are down a little. I dont know what to do.

## 2023-04-07 NOTE — TELEPHONE ENCOUNTER
PATIENT NOTIFIED AND VERBALIZED UNDERSTANDING   Patient states no SOB or wheezing.  Appt made for 4/10/@ 1:30pm virtual

## 2023-04-20 ENCOUNTER — TELEPHONE (OUTPATIENT)
Dept: FAMILY MEDICINE CLINIC | Age: 68
End: 2023-04-20

## 2023-04-23 ENCOUNTER — PATIENT MESSAGE (OUTPATIENT)
Dept: FAMILY MEDICINE CLINIC | Age: 68
End: 2023-04-23

## 2023-05-16 ENCOUNTER — TELEPHONE (OUTPATIENT)
Dept: FAMILY MEDICINE CLINIC | Age: 68
End: 2023-05-16

## 2023-05-18 DIAGNOSIS — B02.9 HERPES ZOSTER WITHOUT COMPLICATION: ICD-10-CM

## 2023-05-18 RX ORDER — VALACYCLOVIR HYDROCHLORIDE 1 G/1
1000 TABLET, FILM COATED ORAL 3 TIMES DAILY
Qty: 21 TABLET | Refills: 0 | Status: SHIPPED | OUTPATIENT
Start: 2023-05-18 | End: 2023-05-25

## 2023-05-18 NOTE — TELEPHONE ENCOUNTER
Pt states that she is only takes the medication when she breaks out in shingles. She recently has another outbreak they are currently on bilateral hips making it painful for the pt to sit down which is why she is asking for a refill on the prescription. Thank you.

## 2023-06-05 NOTE — TELEPHONE ENCOUNTER
Patient's last appointment was : 4-10-23  Patient's next appointment is :  6-16-23  Last refilled: 3-8-22  Pharmacy Verified    Lab Results   Component Value Date    LABA1C 6.1 02/28/2023     Lab Results   Component Value Date    CHOL 186 02/14/2023    TRIG 109 02/14/2023    HDL 65 02/14/2023    LDLCALC 99 02/14/2023     Lab Results   Component Value Date     02/14/2023    K 4.0 02/14/2023     02/14/2023    CO2 29 02/14/2023    BUN 8 02/14/2023    CREATININE 0.5 02/14/2023    GLUCOSE 128 (H) 02/14/2023    CALCIUM 9.0 02/14/2023    PROT 6.6 02/14/2023    LABALBU 4.1 02/14/2023    BILITOT 0.3 02/14/2023    ALKPHOS 103 02/14/2023    AST 19 02/14/2023    ALT 17 02/14/2023    LABGLOM >60 02/14/2023     Lab Results   Component Value Date    TSH 1.520 02/14/2023     Lab Results   Component Value Date    WBC 5.9 02/14/2023    HGB 14.1 02/14/2023    HCT 44.0 02/14/2023    MCV 96.5 02/14/2023     02/14/2023

## 2023-06-06 RX ORDER — SERTRALINE HYDROCHLORIDE 100 MG/1
TABLET, FILM COATED ORAL
Qty: 135 TABLET | Refills: 1 | Status: SHIPPED | OUTPATIENT
Start: 2023-06-06

## 2023-07-25 DIAGNOSIS — F41.9 ANXIETY AND DEPRESSION: Primary | ICD-10-CM

## 2023-07-25 DIAGNOSIS — F32.A ANXIETY AND DEPRESSION: Primary | ICD-10-CM

## 2023-07-26 RX ORDER — BUPROPION HYDROCHLORIDE 300 MG/1
TABLET ORAL
Qty: 30 TABLET | Refills: 2 | Status: SHIPPED | OUTPATIENT
Start: 2023-07-26

## 2023-07-26 NOTE — TELEPHONE ENCOUNTER
Patient's last appointment was : 4/10/2023  Patient's next appointment is :  9/25/2023  Last refilled: 6/20/2023   Pharmacy Verified    Lab Results   Component Value Date    LABA1C 6.1 02/28/2023     Lab Results   Component Value Date    CHOL 186 02/14/2023    TRIG 109 02/14/2023    HDL 65 02/14/2023    LDLCALC 99 02/14/2023     Lab Results   Component Value Date     02/14/2023    K 4.0 02/14/2023     02/14/2023    CO2 29 02/14/2023    BUN 8 02/14/2023    CREATININE 0.5 02/14/2023    GLUCOSE 128 (H) 02/14/2023    CALCIUM 9.0 02/14/2023    PROT 6.6 02/14/2023    LABALBU 4.1 02/14/2023    BILITOT 0.3 02/14/2023    ALKPHOS 103 02/14/2023    AST 19 02/14/2023    ALT 17 02/14/2023    LABGLOM >60 02/14/2023     Lab Results   Component Value Date    TSH 1.520 02/14/2023     Lab Results   Component Value Date    WBC 5.9 02/14/2023    HGB 14.1 02/14/2023    HCT 44.0 02/14/2023    MCV 96.5 02/14/2023     02/14/2023

## 2023-08-04 ENCOUNTER — HOSPITAL ENCOUNTER (OUTPATIENT)
Dept: GENERAL RADIOLOGY | Age: 68
Discharge: HOME | End: 2023-08-04
Payer: MEDICARE

## 2023-08-04 ENCOUNTER — HOSPITAL ENCOUNTER (OUTPATIENT)
Age: 68
Discharge: HOME | End: 2023-08-04
Payer: MEDICARE

## 2023-08-04 ENCOUNTER — OFFICE VISIT (OUTPATIENT)
Dept: FAMILY MEDICINE CLINIC | Age: 68
End: 2023-08-04

## 2023-08-04 VITALS
DIASTOLIC BLOOD PRESSURE: 74 MMHG | RESPIRATION RATE: 17 BRPM | BODY MASS INDEX: 35.9 KG/M2 | WEIGHT: 202.6 LBS | OXYGEN SATURATION: 97 % | TEMPERATURE: 97.8 F | HEIGHT: 63 IN | HEART RATE: 78 BPM | SYSTOLIC BLOOD PRESSURE: 130 MMHG

## 2023-08-04 DIAGNOSIS — Z72.0 TOBACCO ABUSE: ICD-10-CM

## 2023-08-04 DIAGNOSIS — J44.1 ACUTE EXACERBATION OF CHRONIC OBSTRUCTIVE PULMONARY DISEASE (COPD) (HCC): Primary | ICD-10-CM

## 2023-08-04 DIAGNOSIS — J44.1 ACUTE EXACERBATION OF CHRONIC OBSTRUCTIVE PULMONARY DISEASE (COPD) (HCC): ICD-10-CM

## 2023-08-04 PROCEDURE — 71046 X-RAY EXAM CHEST 2 VIEWS: CPT

## 2023-08-04 RX ORDER — AZITHROMYCIN 500 MG/1
500 TABLET, FILM COATED ORAL DAILY
Qty: 3 TABLET | Refills: 0 | Status: SHIPPED | OUTPATIENT
Start: 2023-08-04 | End: 2023-08-07

## 2023-08-04 RX ORDER — PREDNISONE 20 MG/1
20 TABLET ORAL DAILY
Qty: 5 TABLET | Refills: 0 | Status: SHIPPED | OUTPATIENT
Start: 2023-08-04 | End: 2023-08-09

## 2023-08-04 ASSESSMENT — ENCOUNTER SYMPTOMS
VOMITING: 0
VOICE CHANGE: 0
TROUBLE SWALLOWING: 0
WHEEZING: 1
SHORTNESS OF BREATH: 1
COUGH: 1

## 2023-08-08 DIAGNOSIS — J44.1 ACUTE EXACERBATION OF CHRONIC OBSTRUCTIVE PULMONARY DISEASE (COPD) (HCC): ICD-10-CM

## 2023-08-08 RX ORDER — AZITHROMYCIN 500 MG/1
500 TABLET, FILM COATED ORAL DAILY
Qty: 3 TABLET | Refills: 0 | OUTPATIENT
Start: 2023-08-08 | End: 2023-08-11

## 2023-08-08 RX ORDER — PREDNISONE 20 MG/1
20 TABLET ORAL 2 TIMES DAILY
Qty: 10 TABLET | Refills: 0 | Status: SHIPPED | OUTPATIENT
Start: 2023-08-08 | End: 2023-08-13

## 2023-08-08 NOTE — TELEPHONE ENCOUNTER
No need to refill Azithromycin at this time.  Will send in prednisone refill with adjusted dose of 20 mg twice a day

## 2023-08-08 NOTE — TELEPHONE ENCOUNTER
Patient's last appointment was : 8-4-23  Patient's next appointment is :  9-1-23  Last refilled: 8-4-23  Pharmacy Verified    Lab Results   Component Value Date    LABA1C 6.1 02/28/2023     Lab Results   Component Value Date    CHOL 186 02/14/2023    TRIG 109 02/14/2023    HDL 65 02/14/2023    LDLCALC 99 02/14/2023     Lab Results   Component Value Date     02/14/2023    K 4.0 02/14/2023     02/14/2023    CO2 29 02/14/2023    BUN 8 02/14/2023    CREATININE 0.5 02/14/2023    GLUCOSE 128 (H) 02/14/2023    CALCIUM 9.0 02/14/2023    PROT 6.6 02/14/2023    LABALBU 4.1 02/14/2023    BILITOT 0.3 02/14/2023    ALKPHOS 103 02/14/2023    AST 19 02/14/2023    ALT 17 02/14/2023    LABGLOM >60 02/14/2023     Lab Results   Component Value Date    TSH 1.520 02/14/2023     Lab Results   Component Value Date    WBC 5.9 02/14/2023    HGB 14.1 02/14/2023    HCT 44.0 02/14/2023    MCV 96.5 02/14/2023     02/14/2023                             Pt called asking for refill on Prednisone and Azithromycin.

## 2023-08-22 ENCOUNTER — HOSPITAL ENCOUNTER (OUTPATIENT)
Dept: PULMONOLOGY | Age: 68
Discharge: HOME | End: 2023-08-22
Payer: MEDICARE

## 2023-08-22 ENCOUNTER — HOSPITAL ENCOUNTER (OUTPATIENT)
Dept: CT IMAGING | Age: 68
Discharge: HOME | End: 2023-08-22
Payer: MEDICARE

## 2023-08-22 DIAGNOSIS — J44.1 ACUTE EXACERBATION OF CHRONIC OBSTRUCTIVE PULMONARY DISEASE (COPD) (HCC): ICD-10-CM

## 2023-08-22 DIAGNOSIS — Z72.0 TOBACCO ABUSE: ICD-10-CM

## 2023-08-22 PROCEDURE — 94726 PLETHYSMOGRAPHY LUNG VOLUMES: CPT

## 2023-08-22 PROCEDURE — 94060 EVALUATION OF WHEEZING: CPT

## 2023-08-22 PROCEDURE — 94729 DIFFUSING CAPACITY: CPT

## 2023-08-22 PROCEDURE — 71271 CT THORAX LUNG CANCER SCR C-: CPT

## 2023-09-25 ENCOUNTER — TELEPHONE (OUTPATIENT)
Dept: FAMILY MEDICINE CLINIC | Age: 68
End: 2023-09-25

## 2023-09-25 NOTE — TELEPHONE ENCOUNTER
----- Message from Elias Huang sent at 9/25/2023  9:34 AM EDT -----  Subject: Appointment Request    Reason for Call: Established Patient Appointment needed: Routine Existing   Condition Follow Up    QUESTIONS    Reason for appointment request? Available appointments did not meet   patient need     Additional Information for Provider? Pt is wanting to reschedule her   appointment on 9.25.23. I do not show any availability in the next 90 days   however the patient would like to get in before then if possible.  She wills   not want to see anyone else   ---------------------------------------------------------------------------  --------------  Freida DYER  [phone]; OK to leave message on voicemail  ---------------------------------------------------------------------------  --------------  SCRIPT ANSWERS

## 2023-09-27 ENCOUNTER — OFFICE VISIT (OUTPATIENT)
Dept: FAMILY MEDICINE CLINIC | Age: 68
End: 2023-09-27
Payer: MEDICARE

## 2023-09-27 VITALS
HEIGHT: 63 IN | SYSTOLIC BLOOD PRESSURE: 130 MMHG | BODY MASS INDEX: 35.79 KG/M2 | OXYGEN SATURATION: 97 % | WEIGHT: 202 LBS | HEART RATE: 74 BPM | TEMPERATURE: 97.9 F | DIASTOLIC BLOOD PRESSURE: 78 MMHG | RESPIRATION RATE: 16 BRPM

## 2023-09-27 DIAGNOSIS — Z12.31 ENCOUNTER FOR SCREENING MAMMOGRAM FOR BREAST CANCER: ICD-10-CM

## 2023-09-27 DIAGNOSIS — Z11.59 NEED FOR HEPATITIS C SCREENING TEST: ICD-10-CM

## 2023-09-27 DIAGNOSIS — Z72.89 OTHER PROBLEMS RELATED TO LIFESTYLE: ICD-10-CM

## 2023-09-27 DIAGNOSIS — Z78.0 POSTMENOPAUSAL: ICD-10-CM

## 2023-09-27 DIAGNOSIS — Z71.89 LIVING WILL, COUNSELING/DISCUSSION: ICD-10-CM

## 2023-09-27 DIAGNOSIS — I73.9 CLAUDICATION (HCC): ICD-10-CM

## 2023-09-27 DIAGNOSIS — Z00.00 ENCOUNTER FOR ANNUAL WELLNESS VISIT (AWV) IN MEDICARE PATIENT: Primary | ICD-10-CM

## 2023-09-27 DIAGNOSIS — M85.80 OSTEOPENIA, UNSPECIFIED LOCATION: ICD-10-CM

## 2023-09-27 PROCEDURE — 3017F COLORECTAL CA SCREEN DOC REV: CPT | Performed by: FAMILY MEDICINE

## 2023-09-27 PROCEDURE — 93922 UPR/L XTREMITY ART 2 LEVELS: CPT | Performed by: FAMILY MEDICINE

## 2023-09-27 PROCEDURE — G0438 PPPS, INITIAL VISIT: HCPCS | Performed by: FAMILY MEDICINE

## 2023-09-27 PROCEDURE — 1123F ACP DISCUSS/DSCN MKR DOCD: CPT | Performed by: FAMILY MEDICINE

## 2023-09-27 RX ORDER — NICOTINE 21 MG/24HR
1 PATCH, TRANSDERMAL 24 HOURS TRANSDERMAL DAILY
Qty: 84 PATCH | Refills: 0 | Status: SHIPPED | OUTPATIENT
Start: 2023-09-27 | End: 2023-12-20

## 2023-09-27 ASSESSMENT — PATIENT HEALTH QUESTIONNAIRE - PHQ9
4. FEELING TIRED OR HAVING LITTLE ENERGY: 2
6. FEELING BAD ABOUT YOURSELF - OR THAT YOU ARE A FAILURE OR HAVE LET YOURSELF OR YOUR FAMILY DOWN: 1
SUM OF ALL RESPONSES TO PHQ QUESTIONS 1-9: 8
9. THOUGHTS THAT YOU WOULD BE BETTER OFF DEAD, OR OF HURTING YOURSELF: 0
1. LITTLE INTEREST OR PLEASURE IN DOING THINGS: 3
8. MOVING OR SPEAKING SO SLOWLY THAT OTHER PEOPLE COULD HAVE NOTICED. OR THE OPPOSITE, BEING SO FIGETY OR RESTLESS THAT YOU HAVE BEEN MOVING AROUND A LOT MORE THAN USUAL: 0
3. TROUBLE FALLING OR STAYING ASLEEP: 0
2. FEELING DOWN, DEPRESSED OR HOPELESS: 2
SUM OF ALL RESPONSES TO PHQ9 QUESTIONS 1 & 2: 5
SUM OF ALL RESPONSES TO PHQ QUESTIONS 1-9: 8
SUM OF ALL RESPONSES TO PHQ QUESTIONS 1-9: 8
5. POOR APPETITE OR OVEREATING: 0
SUM OF ALL RESPONSES TO PHQ QUESTIONS 1-9: 8
7. TROUBLE CONCENTRATING ON THINGS, SUCH AS READING THE NEWSPAPER OR WATCHING TELEVISION: 0

## 2023-09-27 NOTE — PROGRESS NOTES
Medicare Annual Wellness Visit    Jessica Schilling is here for Annual Exam    Assessment & Plan   Encounter for annual wellness visit (AWV) in Medicare patient  Postmenopausal  -     DEXA BONE DENSITY 2 SITES; Future  Osteopenia, unspecified location  -     DEXA BONE DENSITY 2 SITES; Future  Encounter for screening mammogram for breast cancer  -     YURI DIGITAL SCREEN W OR WO CAD BILATERAL; Future  Need for hepatitis C screening test  -     Hepatitis C Antibody; Future  Other problems related to lifestyle  -     Hepatitis C Antibody; Future  Claudication ([address])  -     [address] - [place] Non-Invas Physiologic STD Extremity ART 1-2 Level  Living will, counseling/discussion  -     [facility] Clinical Specialist    Recommendations for Preventive Services Due: see orders and patient instructions/AVS.  Recommended screening schedule for the next 5-10 years is provided to the patient in written form: see Patient Instructions/AVS.     Return in 1 year (on 9/27/2024). Subjective       Patient's complete Health Risk Assessment and screening values have been reviewed and are found in Flowsheets. The following problems were reviewed today and where indicated follow up appointments were made and/or referrals ordered.     Positive Risk Factor Screenings with Interventions:        Depression:  PHQ-2 Score: 5  PHQ-9 Total Score: 8    Interpretation:   1-4 = minimal  5-9 = mild  10-14 = moderate  15-19 = moderately severe  20-27 = severe  Interventions:  Pt having hard time about  having prostate cancer          General HRA Questions:  Select all that apply: (!) New or Increased Pain    Pain Interventions:  Having leg pain upon walking, gets better with rest        Weight and Activity:  Physical Activity: Inactive (9/27/2023)    Exercise Vital Sign     Days of Exercise per Week: 0 days     Minutes of Exercise per Session: 0 min     On average, how many days per week do you engage in moderate to strenuous exercise (like a

## 2023-09-27 NOTE — PROGRESS NOTES
Health Maintenance Due   Topic Date Due    Hepatitis C screen  Never done    Colorectal Cancer Screen  Never done    Pneumococcal 65+ years Vaccine (3 - PPSV23 or PCV20) 06/30/2021    Annual Wellness Visit (AWV)  Never done    COVID-19 Vaccine (4 - Pfizer series) 12/10/2021    Flu vaccine (1) 08/01/2023

## 2023-09-29 ENCOUNTER — CLINICAL DOCUMENTATION (OUTPATIENT)
Dept: SPIRITUAL SERVICES | Facility: CLINIC | Age: 68
End: 2023-09-29

## 2023-09-29 NOTE — ACP (ADVANCE CARE PLANNING)
Advance Care Planning   Ambulatory ACP Specialist Patient Outreach    Date:  9/29/2023    ACP Specialist:  Margoth Milian    Outreach call to patient in follow-up to ACP Specialist referral Mere Sebastian MD    [x] PCP  [] Provider   [] Ambulatory Care Management [] Other     For:                  [x] Advance Directive Assistance              [] Complete Portable DNR order              [] Complete POST/POLST/MOST              [] Code Status Discussion             [x] Discuss Goals of Care             [] Early ACP Decision-Making              [] Other (Specify)    Date Referral Received: 9/27/2023    Next Step:   [] ACP scheduled conversation  [x] Outreach again in one week               [] Email / Mail [address]  [] Email / Mail Advance Directive   [] Closing referral.  Routing closure to referring provider/staff and to ACP Specialist . [] Closure letter mailed to patient with invitation to contact ACP Specialist if / when ready. [] Other (Specify here):         [x] At this time, Healthcare Decision Maker Is:        [] Primary agent named in scanned advance directive. [x] Legal Next of Kin. [] Unable to determine legal decision maker at this time. Outreaches:       [x] 1st -  Date:   9/29/2023               Intervention:  [] Spoke with Patient   [] Left Voice mail [] Email / Mail    [] [email or web address]  [x] Other (Specify) : Outcomes: No voicemail set up.       Thank you for this referral.

## 2023-10-11 ENCOUNTER — NURSE ONLY (OUTPATIENT)
Dept: LAB | Age: 68
End: 2023-10-11

## 2023-10-11 DIAGNOSIS — E55.9 VITAMIN D DEFICIENCY: ICD-10-CM

## 2023-10-11 DIAGNOSIS — M79.89 SWELLING OF LOWER EXTREMITY: ICD-10-CM

## 2023-10-11 DIAGNOSIS — Z11.59 NEED FOR HEPATITIS C SCREENING TEST: ICD-10-CM

## 2023-10-11 DIAGNOSIS — R73.03 PRE-DIABETES: ICD-10-CM

## 2023-10-11 DIAGNOSIS — Z72.89 OTHER PROBLEMS RELATED TO LIFESTYLE: ICD-10-CM

## 2023-10-11 LAB
25(OH)D3 SERPL-MCNC: 46 NG/ML (ref 30–100)
DEPRECATED MEAN GLUCOSE BLD GHB EST-ACNC: 117 MG/DL (ref 70–126)
HBA1C MFR BLD HPLC: 5.9 % (ref 4.4–6.4)
HCV IGG SERPL QL IA: NEGATIVE

## 2023-10-17 ENCOUNTER — TELEPHONE (OUTPATIENT)
Dept: FAMILY MEDICINE CLINIC | Age: 68
End: 2023-10-17

## 2023-10-17 NOTE — TELEPHONE ENCOUNTER
----- Message from Alicia Murray MD sent at 10/16/2023 12:13 PM EDT -----  Vitamin D is at target. Continue replacement.    HbA1C 5.9%  Hep C screen negative

## 2023-10-19 DIAGNOSIS — E53.8 VITAMIN B12 DEFICIENCY: ICD-10-CM

## 2023-10-19 RX ORDER — CYANOCOBALAMIN 1000 UG/ML
INJECTION, SOLUTION INTRAMUSCULAR; SUBCUTANEOUS
Qty: 2 ML | Refills: 2 | Status: SHIPPED | OUTPATIENT
Start: 2023-10-19

## 2023-10-19 NOTE — TELEPHONE ENCOUNTER
Patient's last appointment was : 9/27/2023 with our Osmany Arriaza  Patient's next appointment is : 9/30/2024 with Dr. Cori Turner  Last refilled on: 8/28/2023  Which pharmacy does the script need sent to: [facility]      Lab Results   Component Value Date    LABA1C 5.9 10/11/2023     Lab Results   Component Value Date    CHOL 186 02/14/2023    TRIG 109 02/14/2023    HDL 65 02/14/2023    LDLCALC 99 02/14/2023     Lab Results   Component Value Date     02/14/2023    K 4.0 02/14/2023     02/14/2023    CO2 29 02/14/2023    BUN 8 02/14/2023    CREATININE 0.5 02/14/2023    GLUCOSE 128 (H) 02/14/2023    CALCIUM 9.0 02/14/2023    PROT 6.6 02/14/2023    LABALBU 4.1 02/14/2023    BILITOT 0.3 02/14/2023    ALKPHOS 103 02/14/2023    AST 19 02/14/2023    ALT 17 02/14/2023    LABGLOM >60 02/14/2023     Lab Results   Component Value Date    TSH 1.520 02/14/2023     Lab Results   Component Value Date    WBC 5.9 02/14/2023    HGB 14.1 02/14/2023    HCT 44.0 02/14/2023    MCV 96.5 02/14/2023     02/14/2023

## 2023-10-30 ENCOUNTER — CLINICAL DOCUMENTATION (OUTPATIENT)
Dept: SPIRITUAL SERVICES | Facility: CLINIC | Age: 68
End: 2023-10-30

## 2023-10-30 NOTE — ACP (ADVANCE CARE PLANNING)
Advance Care Planning   Ambulatory ACP Specialist Patient Outreach    Date:  10/30/2023    ACP Specialist:  Sudhir Galicia    Outreach call to patient in follow-up to ACP Specialist referral from:Quirino Carson MD    [x] PCP  [] Provider   [] Ambulatory Care Management [] Other     For:                  [x] Advance Directive Assistance              [] Complete Portable DNR order              [] Complete POST/POLST/MOST              [] Code Status Discussion             [x] Discuss Goals of Care             [] Early ACP Decision-Making              [] Other (Specify)    Date Referral Received: 9/27/2023    Next Step:   [] ACP scheduled conversation  [] Outreach again in one week               [] Email / Mail [address]  [] Email / Mail Advance Directive   [x] Closing referral.  Routing closure to referring provider/staff and to ACP Specialist . [] Closure letter mailed to patient with invitation to contact ACP Specialist if / when ready. [] Other (Specify here):         [x] At this time, Healthcare Decision Maker Is:        [] Primary agent named in scanned advance directive. [x] Legal Next of Kin. [] Unable to determine legal decision maker at this time. Outreaches:       [x] 1st -  Date:  9/29/2023               Intervention:  [] Spoke with Patient   [] Left Voice mail [] Email / Mail    [] [employer]  [x] Other (Specify) : Outcomes: No voicemail set up. No message could be left. [x] 2nd -  Date:   10/30/2023               Intervention:  [x] Spoke with Patient  [] Left Voice mail [] Email / Mail    [] [employer]  [] Other [phone] : Outcomes:  made a 2nd attempt to contact Gabriel Lei via telephone call to offer support, if desired, for the completion of Advance Directives documents as part of an [address] [place] [address] conversation. Gabriel Lei expressed having no interest in the completion of Advance Directives documents at this time.     remains

## 2023-12-07 RX ORDER — DILTIAZEM HYDROCHLORIDE 120 MG/1
CAPSULE, EXTENDED RELEASE ORAL
Qty: 90 CAPSULE | Refills: 0 | Status: SHIPPED | OUTPATIENT
Start: 2023-12-07

## 2023-12-28 ENCOUNTER — HOSPITAL ENCOUNTER (OUTPATIENT)
Dept: WOMENS IMAGING | Age: 68
Discharge: HOME | End: 2023-12-28
Attending: FAMILY MEDICINE
Payer: MEDICARE

## 2023-12-28 VITALS — BODY MASS INDEX: 36.59 KG/M2 | HEIGHT: 63 IN | WEIGHT: 206.5 LBS

## 2023-12-28 DIAGNOSIS — M85.80 OSTEOPENIA, UNSPECIFIED LOCATION: ICD-10-CM

## 2023-12-28 DIAGNOSIS — Z78.0 POSTMENOPAUSAL: ICD-10-CM

## 2023-12-28 DIAGNOSIS — Z12.31 ENCOUNTER FOR SCREENING MAMMOGRAM FOR BREAST CANCER: ICD-10-CM

## 2023-12-28 PROCEDURE — 77063 BREAST TOMOSYNTHESIS BI: CPT

## 2023-12-28 PROCEDURE — 77080 DXA BONE DENSITY AXIAL: CPT

## 2024-01-22 ENCOUNTER — OFFICE VISIT (OUTPATIENT)
Dept: FAMILY MEDICINE CLINIC | Age: 69
End: 2024-01-22
Payer: MEDICARE

## 2024-01-22 VITALS
WEIGHT: 200.6 LBS | HEART RATE: 76 BPM | RESPIRATION RATE: 12 BRPM | HEIGHT: 63 IN | BODY MASS INDEX: 35.54 KG/M2 | TEMPERATURE: 97.9 F | OXYGEN SATURATION: 96 % | DIASTOLIC BLOOD PRESSURE: 82 MMHG | SYSTOLIC BLOOD PRESSURE: 118 MMHG

## 2024-01-22 DIAGNOSIS — G44.219 EPISODIC TENSION-TYPE HEADACHE, NOT INTRACTABLE: ICD-10-CM

## 2024-01-22 DIAGNOSIS — F17.210 CIGARETTE NICOTINE DEPENDENCE WITHOUT COMPLICATION: ICD-10-CM

## 2024-01-22 DIAGNOSIS — M25.562 PAIN IN BOTH KNEES, UNSPECIFIED CHRONICITY: ICD-10-CM

## 2024-01-22 DIAGNOSIS — M79.605 BILATERAL LEG PAIN: ICD-10-CM

## 2024-01-22 DIAGNOSIS — F41.9 ANXIETY AND DEPRESSION: ICD-10-CM

## 2024-01-22 DIAGNOSIS — I25.10 CORONARY ARTERY DISEASE INVOLVING NATIVE CORONARY ARTERY OF NATIVE HEART WITHOUT ANGINA PECTORIS: ICD-10-CM

## 2024-01-22 DIAGNOSIS — M25.561 PAIN IN BOTH KNEES, UNSPECIFIED CHRONICITY: ICD-10-CM

## 2024-01-22 DIAGNOSIS — M85.89 OSTEOPENIA OF MULTIPLE SITES: ICD-10-CM

## 2024-01-22 DIAGNOSIS — M79.604 BILATERAL LEG PAIN: ICD-10-CM

## 2024-01-22 DIAGNOSIS — Z76.89 ENCOUNTER TO ESTABLISH CARE: Primary | ICD-10-CM

## 2024-01-22 DIAGNOSIS — F32.A ANXIETY AND DEPRESSION: ICD-10-CM

## 2024-01-22 DIAGNOSIS — E66.01 SEVERE OBESITY (BMI 35.0-39.9) WITH COMORBIDITY (HCC): ICD-10-CM

## 2024-01-22 PROCEDURE — 4004F PT TOBACCO SCREEN RCVD TLK: CPT | Performed by: NURSE PRACTITIONER

## 2024-01-22 PROCEDURE — G8417 CALC BMI ABV UP PARAM F/U: HCPCS | Performed by: NURSE PRACTITIONER

## 2024-01-22 PROCEDURE — 99214 OFFICE O/P EST MOD 30 MIN: CPT | Performed by: NURSE PRACTITIONER

## 2024-01-22 PROCEDURE — G8484 FLU IMMUNIZE NO ADMIN: HCPCS | Performed by: NURSE PRACTITIONER

## 2024-01-22 PROCEDURE — 3017F COLORECTAL CA SCREEN DOC REV: CPT | Performed by: NURSE PRACTITIONER

## 2024-01-22 PROCEDURE — G8427 DOCREV CUR MEDS BY ELIG CLIN: HCPCS | Performed by: NURSE PRACTITIONER

## 2024-01-22 PROCEDURE — G8399 PT W/DXA RESULTS DOCUMENT: HCPCS | Performed by: NURSE PRACTITIONER

## 2024-01-22 PROCEDURE — 1123F ACP DISCUSS/DSCN MKR DOCD: CPT | Performed by: NURSE PRACTITIONER

## 2024-01-22 PROCEDURE — 1090F PRES/ABSN URINE INCON ASSESS: CPT | Performed by: NURSE PRACTITIONER

## 2024-01-22 RX ORDER — AMITRIPTYLINE HYDROCHLORIDE 10 MG/1
10 TABLET, FILM COATED ORAL NIGHTLY
Qty: 30 TABLET | Refills: 1 | Status: SHIPPED | OUTPATIENT
Start: 2024-01-22

## 2024-01-22 RX ORDER — ZOLEDRONIC ACID 5 MG/100ML
5 INJECTION, SOLUTION INTRAVENOUS ONCE
Qty: 100 ML | Refills: 0 | Status: SHIPPED | OUTPATIENT
Start: 2024-01-22 | End: 2024-01-22

## 2024-01-22 ASSESSMENT — PATIENT HEALTH QUESTIONNAIRE - PHQ9
SUM OF ALL RESPONSES TO PHQ QUESTIONS 1-9: 5
4. FEELING TIRED OR HAVING LITTLE ENERGY: 0
SUM OF ALL RESPONSES TO PHQ QUESTIONS 1-9: 5
3. TROUBLE FALLING OR STAYING ASLEEP: 1
7. TROUBLE CONCENTRATING ON THINGS, SUCH AS READING THE NEWSPAPER OR WATCHING TELEVISION: 0
10. IF YOU CHECKED OFF ANY PROBLEMS, HOW DIFFICULT HAVE THESE PROBLEMS MADE IT FOR YOU TO DO YOUR WORK, TAKE CARE OF THINGS AT HOME, OR GET ALONG WITH OTHER PEOPLE: 0
8. MOVING OR SPEAKING SO SLOWLY THAT OTHER PEOPLE COULD HAVE NOTICED. OR THE OPPOSITE, BEING SO FIGETY OR RESTLESS THAT YOU HAVE BEEN MOVING AROUND A LOT MORE THAN USUAL: 0
5. POOR APPETITE OR OVEREATING: 0
1. LITTLE INTEREST OR PLEASURE IN DOING THINGS: 3
SUM OF ALL RESPONSES TO PHQ QUESTIONS 1-9: 5
6. FEELING BAD ABOUT YOURSELF - OR THAT YOU ARE A FAILURE OR HAVE LET YOURSELF OR YOUR FAMILY DOWN: 0
9. THOUGHTS THAT YOU WOULD BE BETTER OFF DEAD, OR OF HURTING YOURSELF: 0
SUM OF ALL RESPONSES TO PHQ9 QUESTIONS 1 & 2: 4
SUM OF ALL RESPONSES TO PHQ QUESTIONS 1-9: 5
2. FEELING DOWN, DEPRESSED OR HOPELESS: 1

## 2024-01-22 NOTE — PROGRESS NOTES
[facility] at [facility]  [address] [place] [address]  Chief Complaint   Patient presents with    Knee Pain     Going on for about 6 months, no known injury. Pain in legs bilat, right leg worse. States pain is from the whole leg. Pain described as a dull ache. States went to a pain specialist was given Gabapentin, Celebrex, and a muscle relaxer and not helping pain. Pain is constant. Worsens when she is up on feet for long periods of time.        History obtained from chart review and the patient.    SUBJECTIVE:  Chetna Borges is a 68 y.o. female that presents today for establishing care with new physician, etc. New patient, [facility].    Osteopenia  Had DEXA done 12/23 which showed worsening Osteopenia  She is currently taking Evista since 2021 and vitamin D  Has never done bisphosphonate therapy    C/o bilateral leg pain for about 4-5 months  C/o that her muscles and her bones hurt so bad, from thighs all the way down  Muscles feel sore  She has been on Crestor for quite some time  Pain is more of an aching pain  Pain is worse with walking  She has seen pain specialist in [place]-tried Gabapentin, muscle relaxants, Celebrex without improvement      Headaches    HPI:    Description of headaches - starts in the back of head and wraps around, feels like squeezing sensation  Frequency of Headaches - about once a week  Level of disability - n/a    Currently on prophylactic therapy?  No  Taking abortive therapy?   Yes - motrin, tylenol    Associated Aura?  No  Photophobia, Phonophobia?  Yes if it gets bad enough  Nausea or Vomiting?  Yes  Recent change in Headaches?  No  Do headaches awaken her from sleep?  Yes    Patient is currently taking Wellbutrin to help with depression and smoking cessation  She does not feel like it is helping with smoking cessation at all  Moods are good, but does have a lot of stress, mostly related to caregiver burnout (cares for her  who is in

## 2024-01-23 DIAGNOSIS — M85.89 OSTEOPENIA OF MULTIPLE SITES: Primary | ICD-10-CM

## 2024-01-23 RX ORDER — SODIUM CHLORIDE 0.9 % (FLUSH) 0.9 %
5-40 SYRINGE (ML) INJECTION PRN
OUTPATIENT
Start: 2024-01-30

## 2024-01-23 RX ORDER — DIPHENHYDRAMINE HYDROCHLORIDE 50 MG/ML
50 INJECTION INTRAMUSCULAR; INTRAVENOUS
OUTPATIENT
Start: 2024-01-30

## 2024-01-23 RX ORDER — SODIUM CHLORIDE 9 MG/ML
INJECTION, SOLUTION INTRAVENOUS CONTINUOUS
OUTPATIENT
Start: 2024-01-30

## 2024-01-23 RX ORDER — HEPARIN 100 UNIT/ML
500 SYRINGE INTRAVENOUS PRN
OUTPATIENT
Start: 2024-01-30

## 2024-01-23 RX ORDER — SODIUM CHLORIDE 9 MG/ML
5-250 INJECTION, SOLUTION INTRAVENOUS PRN
OUTPATIENT
Start: 2024-01-30

## 2024-01-23 RX ORDER — EPINEPHRINE 1 MG/ML
0.3 INJECTION, SOLUTION, CONCENTRATE INTRAVENOUS PRN
OUTPATIENT
Start: 2024-01-30

## 2024-01-23 RX ORDER — ZOLEDRONIC ACID 5 MG/100ML
5 INJECTION, SOLUTION INTRAVENOUS ONCE
OUTPATIENT
Start: 2024-01-30 | End: 2024-01-30

## 2024-01-26 ENCOUNTER — HOSPITAL ENCOUNTER (OUTPATIENT)
Age: 69
Discharge: HOME | End: 2024-01-26
Payer: MEDICARE

## 2024-01-26 ENCOUNTER — HOSPITAL ENCOUNTER (OUTPATIENT)
Dept: GENERAL RADIOLOGY | Age: 69
Discharge: HOME | End: 2024-01-26
Payer: MEDICARE

## 2024-01-26 ENCOUNTER — OFFICE VISIT (OUTPATIENT)
Dept: CARDIOLOGY CLINIC | Age: 69
End: 2024-01-26

## 2024-01-26 VITALS
BODY MASS INDEX: 34.73 KG/M2 | HEIGHT: 63 IN | WEIGHT: 196 LBS | DIASTOLIC BLOOD PRESSURE: 62 MMHG | HEART RATE: 83 BPM | SYSTOLIC BLOOD PRESSURE: 135 MMHG

## 2024-01-26 DIAGNOSIS — I25.10 CORONARY ARTERY DISEASE INVOLVING NATIVE CORONARY ARTERY OF NATIVE HEART WITHOUT ANGINA PECTORIS: Primary | ICD-10-CM

## 2024-01-26 DIAGNOSIS — R74.8 ELEVATED CK: Primary | ICD-10-CM

## 2024-01-26 DIAGNOSIS — M25.561 PAIN IN BOTH KNEES, UNSPECIFIED CHRONICITY: ICD-10-CM

## 2024-01-26 DIAGNOSIS — R06.09 DOE (DYSPNEA ON EXERTION): ICD-10-CM

## 2024-01-26 DIAGNOSIS — M25.562 PAIN IN BOTH KNEES, UNSPECIFIED CHRONICITY: ICD-10-CM

## 2024-01-26 DIAGNOSIS — M79.604 BILATERAL LEG PAIN: ICD-10-CM

## 2024-01-26 DIAGNOSIS — E66.01 SEVERE OBESITY (BMI 35.0-39.9) WITH COMORBIDITY (HCC): ICD-10-CM

## 2024-01-26 DIAGNOSIS — I25.10 CORONARY ARTERY DISEASE INVOLVING NATIVE CORONARY ARTERY OF NATIVE HEART WITHOUT ANGINA PECTORIS: ICD-10-CM

## 2024-01-26 DIAGNOSIS — M79.605 BILATERAL LEG PAIN: ICD-10-CM

## 2024-01-26 LAB
ALBUMIN SERPL BCG-MCNC: 4.2 G/DL (ref 3.5–5.1)
ALP SERPL-CCNC: 127 U/L (ref 38–126)
ALT SERPL W/O P-5'-P-CCNC: 14 U/L (ref 11–66)
ANION GAP SERPL CALC-SCNC: 14 MEQ/L (ref 8–16)
AST SERPL-CCNC: 19 U/L (ref 5–40)
BILIRUB SERPL-MCNC: 0.4 MG/DL (ref 0.3–1.2)
BUN SERPL-MCNC: 10 MG/DL (ref 7–22)
CALCIUM SERPL-MCNC: 9.3 MG/DL (ref 8.5–10.5)
CHLORIDE SERPL-SCNC: 99 MEQ/L (ref 98–111)
CHOLESTEROL, FASTING: 185 MG/DL (ref 100–199)
CK SERPL-CCNC: 170 U/L (ref 30–135)
CO2 SERPL-SCNC: 28 MEQ/L (ref 23–33)
CREAT SERPL-MCNC: 0.5 MG/DL (ref 0.4–1.2)
CRP SERPL-MCNC: 0.44 MG/DL (ref 0–1)
ERYTHROCYTE [SEDIMENTATION RATE] IN BLOOD BY WESTERGREN METHOD: 12 MM/HR (ref 0–20)
GFR SERPL CREATININE-BSD FRML MDRD: > 60 ML/MIN/1.73M2
GLUCOSE SERPL-MCNC: 91 MG/DL (ref 70–108)
HDLC SERPL-MCNC: 64 MG/DL
LDLC SERPL CALC-MCNC: 103 MG/DL
POTASSIUM SERPL-SCNC: 4.3 MEQ/L (ref 3.5–5.2)
PROT SERPL-MCNC: 6.8 G/DL (ref 6.1–8)
RHEUMATOID FACT SERPL-ACNC: < 10 IU/ML (ref 0–13)
SODIUM SERPL-SCNC: 141 MEQ/L (ref 135–145)
TRIGLYCERIDE, FASTING: 91 MG/DL (ref 0–199)
TSH SERPL DL<=0.005 MIU/L-ACNC: 1.48 UIU/ML (ref 0.4–4.2)

## 2024-01-26 PROCEDURE — 82550 ASSAY OF CK (CPK): CPT

## 2024-01-26 PROCEDURE — 73562 X-RAY EXAM OF KNEE 3: CPT

## 2024-01-26 PROCEDURE — 80061 LIPID PANEL: CPT

## 2024-01-26 PROCEDURE — 86038 ANTINUCLEAR ANTIBODIES: CPT

## 2024-01-26 PROCEDURE — 80053 COMPREHEN METABOLIC PANEL: CPT

## 2024-01-26 PROCEDURE — 86200 CCP ANTIBODY: CPT

## 2024-01-26 PROCEDURE — 86140 C-REACTIVE PROTEIN: CPT

## 2024-01-26 PROCEDURE — 85651 RBC SED RATE NONAUTOMATED: CPT

## 2024-01-26 PROCEDURE — 36415 COLL VENOUS BLD VENIPUNCTURE: CPT

## 2024-01-26 PROCEDURE — 86430 RHEUMATOID FACTOR TEST QUAL: CPT

## 2024-01-26 PROCEDURE — 84443 ASSAY THYROID STIM HORMONE: CPT

## 2024-01-26 NOTE — PROGRESS NOTES
Pt C/O no cardiac symptoms at this time      Pt denies CP, SOB, Headache, dizziness, heart palpitations, swelling, fatigue

## 2024-01-26 NOTE — PROGRESS NOTES
[facility] [place] [facility]  [facility]  [address] [facility] [place] [address]  Dept: [phone]  Dept Fax: [phone]  Loc: [phone]    Visit Date: 2024    Ms. Borges is a 68 y.o. female  who presented for:  CAD  HPI:   HPI   Chetna Borges is a pleasant 68 year old female patient who  has a past medical history of Age-related osteoporosis without current pathological fracture, Coronary artery disease involving native coronary artery of native heart without angina pectoris, Depression, GERD without esophagitis, Idiopathic peripheral neuropathy, Lumbar degenerative disc disease, Sleep apnea, and Vitamin B12 deficiency. Her FH is positive for MI/pacemaker in her mother. Her sister  from MI at age 63. She has h/o tobacco abuse, still smokes. Patient has h/o CAD, angioplasty in  (denies prior stenting). Reports that in  [facility] revealed nonobstructive CAD. Stress test 2022 was negative for ischemia. Echo 2022 revealed preserved EF, grade I DD, Mild AI. Patient denies chest pain, shortness of breath, dyspnea on exertion. She states that she has been busy taking care of her sick  who had knee surgery and prostate cancer.       Current Outpatient Medications:     zoledronic acid (RECLAST) 5 MG/100ML SOLN, Infuse 100 mLs intravenously once for 1 dose, Disp: 100 mL, Rfl: 0    amitriptyline (ELAVIL) 10 MG tablet, Take 1 tablet by mouth nightly, Disp: 30 tablet, Rfl: 1    dilTIAZem (DILT-XR) 120 MG extended release capsule, Take 1 capsule by mouth one time daily to help control blood pressure., Disp: 90 capsule, Rfl: 0    cyanocobalamin 1000 MCG/ML injection, Inject 1 mL into the muscle every 14 days TO REPLACE B 12., Disp: 2 mL, Rfl: 2    Spacer/Aero-Holding Chambers SHELDON, 1 Device by Does not apply route daily as needed (use with inhaler), Disp: 1 each, Rfl: 0    albuterol sulfate HFA (VENTOLIN HFA) 108 (90 Base) MCG/ACT inhaler, Inhale 2 puffs into the lungs

## 2024-01-27 LAB — CYCLIC CITRULLINATED PEPTIDE ANTIBODY IGG: 1.2 U/ML (ref 0–7)

## 2024-01-28 LAB — NUCLEAR IGG SER QL IA: NORMAL

## 2024-01-29 ENCOUNTER — TELEPHONE (OUTPATIENT)
Dept: FAMILY MEDICINE CLINIC | Age: 69
End: 2024-01-29

## 2024-01-29 NOTE — TELEPHONE ENCOUNTER
----- Message from SAMRA Maldonado CNP sent at 1/26/2024  2:31 PM EST -----  Labs are stable, except her CK was elevated which could be due to muscle damage/inflammation, among other causes. Would first try stopping her Crestor and repeating CK levels in 1-2 weeks after stopping the Crestor and see if this helps her leg pain as well.

## 2024-01-29 NOTE — TELEPHONE ENCOUNTER
----- Message from SAMRA Hill - CNP sent at 1/29/2024  8:18 AM EST -----  Let Chetna know her knee xrays do show some mild arthritis. I would see how she feels off the Crestor. Will f/u as scheduled and she can let me know then

## 2024-02-07 ENCOUNTER — TELEPHONE (OUTPATIENT)
Dept: FAMILY MEDICINE CLINIC | Age: 69
End: 2024-02-07

## 2024-02-07 ENCOUNTER — HOSPITAL ENCOUNTER (OUTPATIENT)
Age: 69
Discharge: HOME | End: 2024-02-07
Payer: MEDICARE

## 2024-02-07 DIAGNOSIS — R74.8 ELEVATED CK: ICD-10-CM

## 2024-02-07 LAB — CK SERPL-CCNC: 57 U/L (ref 30–135)

## 2024-02-07 PROCEDURE — 82550 ASSAY OF CK (CPK): CPT

## 2024-02-07 PROCEDURE — 36415 COLL VENOUS BLD VENIPUNCTURE: CPT

## 2024-02-07 NOTE — TELEPHONE ENCOUNTER
----- Message from SAMRA Hill - CNP sent at 2/7/2024 10:09 AM EST -----  Let kavya know her repeat CK level is in normal range. Did the muscle aches and pains get better with stopping the Crestor?

## 2024-02-07 NOTE — TELEPHONE ENCOUNTER
Ok. Let's continue holding the Crestor for now. The bone pain could be the arthritis, her knee xrays did show some arthritis. Let's follow up as scheduled and see how it is and we can go over then the next steps.

## 2024-02-07 NOTE — TELEPHONE ENCOUNTER
Patient informed on lab results, verbally understood.      Pt states the muscle aches and pains have gotten a little better, get worse with walking. Pt states it feels like it's more of pain in the \"bones\".

## 2024-02-09 ENCOUNTER — HOSPITAL ENCOUNTER (OUTPATIENT)
Dept: NURSING | Age: 69
Discharge: HOME | End: 2024-02-09
Payer: MEDICARE

## 2024-02-09 VITALS
BODY MASS INDEX: 34.72 KG/M2 | SYSTOLIC BLOOD PRESSURE: 117 MMHG | WEIGHT: 196 LBS | HEART RATE: 87 BPM | RESPIRATION RATE: 18 BRPM | OXYGEN SATURATION: 93 % | DIASTOLIC BLOOD PRESSURE: 56 MMHG

## 2024-02-09 DIAGNOSIS — M85.89 OSTEOPENIA OF MULTIPLE SITES: Primary | ICD-10-CM

## 2024-02-09 PROCEDURE — 96365 THER/PROPH/DIAG IV INF INIT: CPT

## 2024-02-09 PROCEDURE — 6360000002 HC RX W HCPCS: Performed by: NURSE PRACTITIONER

## 2024-02-09 RX ORDER — SODIUM CHLORIDE 9 MG/ML
5-250 INJECTION, SOLUTION INTRAVENOUS PRN
OUTPATIENT
Start: 2024-02-09

## 2024-02-09 RX ORDER — SODIUM CHLORIDE 9 MG/ML
INJECTION, SOLUTION INTRAVENOUS CONTINUOUS
OUTPATIENT
Start: 2024-02-09

## 2024-02-09 RX ORDER — EPINEPHRINE 1 MG/ML
0.3 INJECTION, SOLUTION INTRAMUSCULAR; SUBCUTANEOUS PRN
OUTPATIENT
Start: 2024-02-09

## 2024-02-09 RX ORDER — ZOLEDRONIC ACID 5 MG/100ML
5 INJECTION, SOLUTION INTRAVENOUS ONCE
Status: COMPLETED | OUTPATIENT
Start: 2024-02-09 | End: 2024-02-09

## 2024-02-09 RX ORDER — ZOLEDRONIC ACID 5 MG/100ML
5 INJECTION, SOLUTION INTRAVENOUS ONCE
Status: CANCELLED | OUTPATIENT
Start: 2024-02-09 | End: 2024-02-09

## 2024-02-09 RX ORDER — DIPHENHYDRAMINE HYDROCHLORIDE 50 MG/ML
50 INJECTION INTRAMUSCULAR; INTRAVENOUS
OUTPATIENT
Start: 2024-02-09

## 2024-02-09 RX ORDER — SODIUM CHLORIDE 0.9 % (FLUSH) 0.9 %
5-40 SYRINGE (ML) INJECTION PRN
Status: DISCONTINUED | OUTPATIENT
Start: 2024-02-09 | End: 2024-02-10 | Stop reason: HOSPADM

## 2024-02-09 RX ORDER — HEPARIN 100 UNIT/ML
500 SYRINGE INTRAVENOUS PRN
OUTPATIENT
Start: 2024-02-09

## 2024-02-09 RX ORDER — SODIUM CHLORIDE 0.9 % (FLUSH) 0.9 %
5-40 SYRINGE (ML) INJECTION PRN
Status: CANCELLED | OUTPATIENT
Start: 2024-02-09

## 2024-02-09 RX ADMIN — ZOLEDRONIC ACID 5 MG: 5 INJECTION, SOLUTION INTRAVENOUS at 12:05

## 2024-02-09 NOTE — PROGRESS NOTES
1200 pt arrives ambulatory with spouse for reclast infusion. Infusion explained and questions answered. PT RIGHTS AND RESPONSIBILITIES OFFERED TO PT. Pt meets criteria for reclast infusion.  1220 infusion complete. Pt tolerated it well with no complaints.   1250 vitals stable. Pt discharged ambulatory with spouse with instructions with no complaints.           _m___ Safety:       (Environmental)  [place] to environment  Ensure ID band is correct and in place/ allergy band as needed  Assess for fall risk  Initiate fall precautions as applicable (fall band, side rails, etc.)  Call light within reach  Bed in low position/ wheels locked    _m___ Pain:       Assess pain level and characteristics  Administer analgesics as ordered  Assess effectiveness of pain management and report to MD as needed    _m___ Knowledge Deficit:  Assess baseline knowledge  Provide teaching at level of understanding  Provide teaching via preferred learning method  Evaluate teaching effectiveness    _m___ Hemodynamic/Respiratory Status:       (Pre and Post Procedure Monitoring)  Assess/Monitor vital signs and LOC  Assess Baseline SpO2 prior to any sedation  Obtain weight/height  Assess vital signs/ LOC until patient meets discharge criteria  Monitor procedure site and notify MD of any issues

## 2024-02-09 NOTE — DISCHARGE INSTRUCTIONS
RECLAST DISCHARGE INSTRUCTIONS    DIET:  Drink extra fluids.  ACTIVITY : Usual     1.  Continue to take adequate calcium and vitamin D.  Recommend calcium 1200 mg per day and vitamin D 400 - 800 units daily.    2.  If flu like symptoms - fever, muscle soreness or headache take Tylenol or Ibuprofen.  Symptoms may last up to 3 days, sometimes 7 - 14 days.    3.  Follow up with ordering physician.    4.  Any problems return to emergency room.    5.  Continue usual medication.    MEDICATION GIVEN YEARLY.  CONSULT YOUR DOCTOR FOR NEXT DOSE.

## 2024-02-12 ENCOUNTER — TELEPHONE (OUTPATIENT)
Dept: FAMILY MEDICINE CLINIC | Age: 69
End: 2024-02-12

## 2024-02-12 DIAGNOSIS — U07.1 COVID-19 VIRUS INFECTION: Primary | ICD-10-CM

## 2024-02-12 NOTE — TELEPHONE ENCOUNTER
Rx sent to pharmacy. If she's not getting better, or getting worse, then I would definitely need to see her in office.

## 2024-02-12 NOTE — TELEPHONE ENCOUNTER
Pt called stating she has tested positive for covid on Saturday and today. Pt has symptoms of body aches, joint pain, upset stomach, pt had a HA and fever, but those have went away.    Please advise    [facility]

## 2024-02-12 NOTE — TELEPHONE ENCOUNTER
Did she test positive via at home test? Any SOB? I'm ok to prescribe the Paxlovid (antiviral medicine for COVID) if she's ok with it. Let me know.

## 2024-02-19 ENCOUNTER — OFFICE VISIT (OUTPATIENT)
Dept: FAMILY MEDICINE CLINIC | Age: 69
End: 2024-02-19
Payer: MEDICARE

## 2024-02-19 VITALS
OXYGEN SATURATION: 97 % | DIASTOLIC BLOOD PRESSURE: 64 MMHG | WEIGHT: 195.2 LBS | SYSTOLIC BLOOD PRESSURE: 118 MMHG | BODY MASS INDEX: 34.59 KG/M2 | RESPIRATION RATE: 12 BRPM | HEART RATE: 76 BPM | HEIGHT: 63 IN | TEMPERATURE: 96.8 F

## 2024-02-19 DIAGNOSIS — G44.219 EPISODIC TENSION-TYPE HEADACHE, NOT INTRACTABLE: Primary | ICD-10-CM

## 2024-02-19 DIAGNOSIS — F33.1 MODERATE EPISODE OF RECURRENT MAJOR DEPRESSIVE DISORDER (HCC): ICD-10-CM

## 2024-02-19 DIAGNOSIS — I73.9 CLAUDICATION OF BOTH LOWER EXTREMITIES (HCC): ICD-10-CM

## 2024-02-19 PROBLEM — J44.1 ACUTE EXACERBATION OF CHRONIC OBSTRUCTIVE PULMONARY DISEASE (COPD) (HCC): Status: ACTIVE | Noted: 2024-02-19

## 2024-02-19 PROBLEM — J44.1 ACUTE EXACERBATION OF CHRONIC OBSTRUCTIVE PULMONARY DISEASE (COPD) (HCC): Status: RESOLVED | Noted: 2024-02-19 | Resolved: 2024-02-19

## 2024-02-19 PROCEDURE — G8427 DOCREV CUR MEDS BY ELIG CLIN: HCPCS | Performed by: NURSE PRACTITIONER

## 2024-02-19 PROCEDURE — G8417 CALC BMI ABV UP PARAM F/U: HCPCS | Performed by: NURSE PRACTITIONER

## 2024-02-19 PROCEDURE — 3017F COLORECTAL CA SCREEN DOC REV: CPT | Performed by: NURSE PRACTITIONER

## 2024-02-19 PROCEDURE — 1090F PRES/ABSN URINE INCON ASSESS: CPT | Performed by: NURSE PRACTITIONER

## 2024-02-19 PROCEDURE — G8399 PT W/DXA RESULTS DOCUMENT: HCPCS | Performed by: NURSE PRACTITIONER

## 2024-02-19 PROCEDURE — 4004F PT TOBACCO SCREEN RCVD TLK: CPT | Performed by: NURSE PRACTITIONER

## 2024-02-19 PROCEDURE — G8484 FLU IMMUNIZE NO ADMIN: HCPCS | Performed by: NURSE PRACTITIONER

## 2024-02-19 PROCEDURE — 1123F ACP DISCUSS/DSCN MKR DOCD: CPT | Performed by: NURSE PRACTITIONER

## 2024-02-19 PROCEDURE — 99214 OFFICE O/P EST MOD 30 MIN: CPT | Performed by: NURSE PRACTITIONER

## 2024-02-19 RX ORDER — ESCITALOPRAM OXALATE 10 MG/1
10 TABLET ORAL DAILY
Qty: 30 TABLET | Refills: 1 | Status: SHIPPED | OUTPATIENT
Start: 2024-02-19

## 2024-02-19 RX ORDER — AMITRIPTYLINE HYDROCHLORIDE 10 MG/1
10 TABLET, FILM COATED ORAL NIGHTLY
Qty: 90 TABLET | Refills: 1 | Status: SHIPPED | OUTPATIENT
Start: 2024-02-19

## 2024-02-19 SDOH — ECONOMIC STABILITY: FOOD INSECURITY: WITHIN THE PAST 12 MONTHS, YOU WORRIED THAT YOUR FOOD WOULD RUN OUT BEFORE YOU GOT MONEY TO BUY MORE.: NEVER TRUE

## 2024-02-19 SDOH — ECONOMIC STABILITY: INCOME INSECURITY: HOW HARD IS IT FOR YOU TO PAY FOR THE VERY BASICS LIKE FOOD, HOUSING, MEDICAL CARE, AND HEATING?: NOT HARD AT ALL

## 2024-02-19 SDOH — ECONOMIC STABILITY: FOOD INSECURITY: WITHIN THE PAST 12 MONTHS, THE FOOD YOU BOUGHT JUST DIDN'T LAST AND YOU DIDN'T HAVE MONEY TO GET MORE.: NEVER TRUE

## 2024-02-19 NOTE — PROGRESS NOTES
Grandmother     Heart Disease Maternal Grandfather          I have reviewed the patient's past medical history, past surgical history, allergies, medications, social and family history and I have made updates where appropriate.      Review of Systems  Positive responses are highlighted in bold    Constitutional:  Fever, Chills, Night Sweats, Fatigue, Unexpected changes in weight  Eyes:  Eye discharge, Eye pain, Eye redness, Visual disturbances   HENT:  Ear pain, Tinnitus, Nosebleeds, Trouble swallowing, Hearing loss, Sore throat  Cardiovascular:  Chest Pain, Palpitations, Orthopnea, Paroxysmal Nocturnal Dyspnea  Respiratory:  Cough, Wheezing, Shortness of breath, Chest tightness, Apnea  Gastrointestinal:  Nausea, Vomiting, Diarrhea, Constipation, Heartburn, Blood in stool  Genitourinary:  Difficulty or painful urination, Flank pain, Change in frequency, Urgency  Skin:  Color change, Rash, Itching, Wound  Psychiatric:  Hallucinations, Anxiety, Depression, Suicidal ideation  Hematological:  Enlarged glands, Easy bleeding, Easily bruising  Musculoskeletal:  Joint pain, Back pain, Gait problems, Joint swelling, Myalgias  Neurological:  Dizziness, Headaches, Presyncope, Numbness, Seizures, Tremors  Allergy:  Environmental allergies, Food allergies  Endocrine:  Heat Intolerance, Cold Intolerance, Polydipsia, Polyphagia, Polyuria    DEXA scan 12/28/23  Current exam:     Lumbar Spine: L1-L4  Bone mineral density (gm/cm2): 1.171, T-score: 1.1, Z-score: 3.3, This qualifies as normal bone mineral density.     Femoral Neck Left:   Bone mineral density (gm/cm2): 0.657, T-score: -1.7, Z-score: 0.1, This qualifies as osteopenia.     Femoral Neck Right:   Bone mineral density (gm/cm2): 0.848, T-score: -0.0, Z-score: 1.8, This qualifies as normal bone mineral density.     Total Hip Left:   Bone mineral density (gm/cm2): 0.801, T-score: -1.2, Z-score: 0.4, This qualifies as osteopenia.     Total Hip Right:  Bone mineral density

## 2024-02-21 ENCOUNTER — HOSPITAL ENCOUNTER (OUTPATIENT)
Dept: INTERVENTIONAL RADIOLOGY/VASCULAR | Age: 69
Discharge: HOME | End: 2024-02-23
Payer: MEDICARE

## 2024-02-21 DIAGNOSIS — I73.9 CLAUDICATION OF BOTH LOWER EXTREMITIES (HCC): ICD-10-CM

## 2024-02-21 PROCEDURE — 93922 UPR/L XTREMITY ART 2 LEVELS: CPT

## 2024-02-22 ENCOUNTER — TELEPHONE (OUTPATIENT)
Dept: FAMILY MEDICINE CLINIC | Age: 69
End: 2024-02-22

## 2024-02-22 NOTE — TELEPHONE ENCOUNTER
----- Message from SAMRA Hill CNP sent at 2/22/2024  7:43 AM EST -----  Let Chetna know her blood flow study to her legs is normal.

## 2024-03-08 ENCOUNTER — PATIENT MESSAGE (OUTPATIENT)
Dept: FAMILY MEDICINE CLINIC | Age: 69
End: 2024-03-08

## 2024-03-08 DIAGNOSIS — M79.601 BILATERAL ARM PAIN: ICD-10-CM

## 2024-03-08 DIAGNOSIS — M79.604 BILATERAL LEG PAIN: Primary | ICD-10-CM

## 2024-03-08 DIAGNOSIS — M79.602 BILATERAL ARM PAIN: ICD-10-CM

## 2024-03-08 DIAGNOSIS — M79.605 BILATERAL LEG PAIN: Primary | ICD-10-CM

## 2024-03-08 RX ORDER — CELECOXIB 100 MG/1
100 CAPSULE ORAL 2 TIMES DAILY
Qty: 60 CAPSULE | Refills: 0 | Status: SHIPPED | OUTPATIENT
Start: 2024-03-08

## 2024-03-08 NOTE — TELEPHONE ENCOUNTER
From: Chetna Borges  To: Rogelio Martinez  Sent: 3/8/2024 10:26 AM EST  Subject: Legs and arms hurting     I would like to know if Rogelio could call me in something to take for my legs hurting. I am really hurting today. I just want to function without less pain. My arms are hurting too. Please let me know. Thank you

## 2024-03-28 ENCOUNTER — PATIENT MESSAGE (OUTPATIENT)
Dept: FAMILY MEDICINE CLINIC | Age: 69
End: 2024-03-28

## 2024-03-28 DIAGNOSIS — M54.16 LUMBAR RADICULOPATHY: Primary | ICD-10-CM

## 2024-03-28 RX ORDER — GABAPENTIN 800 MG/1
800 TABLET ORAL 3 TIMES DAILY
Qty: 90 TABLET | Refills: 0 | Status: SHIPPED | OUTPATIENT
Start: 2024-03-28 | End: 2024-04-27

## 2024-03-28 NOTE — TELEPHONE ENCOUNTER
From: Chetna Borges  To: Rogelio Martinez  Sent: 3/28/2024 8:47 AM EDT  Subject: Legs hurting     I know I see Rogelio on Monday but I can’t handle my legs hurting. What ever I do it makes it worse. Like walking, cleaning and just standing. Motrin or Tylenol doesn’t help and it is messing with my stomach. Any help he can give me I appreciate it. Thank You

## 2024-04-01 ENCOUNTER — OFFICE VISIT (OUTPATIENT)
Dept: FAMILY MEDICINE CLINIC | Age: 69
End: 2024-04-01
Payer: MEDICARE

## 2024-04-01 VITALS
OXYGEN SATURATION: 98 % | BODY MASS INDEX: 35.15 KG/M2 | TEMPERATURE: 97.2 F | HEART RATE: 77 BPM | RESPIRATION RATE: 14 BRPM | WEIGHT: 198.4 LBS | SYSTOLIC BLOOD PRESSURE: 124 MMHG | HEIGHT: 63 IN | DIASTOLIC BLOOD PRESSURE: 76 MMHG

## 2024-04-01 DIAGNOSIS — F33.1 MODERATE EPISODE OF RECURRENT MAJOR DEPRESSIVE DISORDER (HCC): ICD-10-CM

## 2024-04-01 DIAGNOSIS — M25.561 RIGHT KNEE PAIN, UNSPECIFIED CHRONICITY: Primary | ICD-10-CM

## 2024-04-01 DIAGNOSIS — M17.11 PRIMARY OSTEOARTHRITIS OF RIGHT KNEE: ICD-10-CM

## 2024-04-01 DIAGNOSIS — K43.9 VENTRAL HERNIA WITHOUT OBSTRUCTION OR GANGRENE: ICD-10-CM

## 2024-04-01 DIAGNOSIS — M25.361 INSTABILITY OF RIGHT KNEE JOINT: ICD-10-CM

## 2024-04-01 PROCEDURE — G8417 CALC BMI ABV UP PARAM F/U: HCPCS | Performed by: NURSE PRACTITIONER

## 2024-04-01 PROCEDURE — 1090F PRES/ABSN URINE INCON ASSESS: CPT | Performed by: NURSE PRACTITIONER

## 2024-04-01 PROCEDURE — G8427 DOCREV CUR MEDS BY ELIG CLIN: HCPCS | Performed by: NURSE PRACTITIONER

## 2024-04-01 PROCEDURE — 1123F ACP DISCUSS/DSCN MKR DOCD: CPT | Performed by: NURSE PRACTITIONER

## 2024-04-01 PROCEDURE — G8399 PT W/DXA RESULTS DOCUMENT: HCPCS | Performed by: NURSE PRACTITIONER

## 2024-04-01 PROCEDURE — 4004F PT TOBACCO SCREEN RCVD TLK: CPT | Performed by: NURSE PRACTITIONER

## 2024-04-01 PROCEDURE — 99214 OFFICE O/P EST MOD 30 MIN: CPT | Performed by: NURSE PRACTITIONER

## 2024-04-01 PROCEDURE — 3017F COLORECTAL CA SCREEN DOC REV: CPT | Performed by: NURSE PRACTITIONER

## 2024-04-01 RX ORDER — ESCITALOPRAM OXALATE 20 MG/1
20 TABLET ORAL DAILY
Qty: 30 TABLET | Refills: 2 | Status: SHIPPED | OUTPATIENT
Start: 2024-04-01

## 2024-04-01 NOTE — PROGRESS NOTES
Currently    Drug use: Not Currently    Sexual activity: Not Currently     Partners: Male   Other Topics Concern    Not on file   Social History Narrative    Not on file     Social Determinants of Health     Financial Resource Strain: Low Risk  (2024)    Overall Financial Resource Strain (CARDIA)     Difficulty of Paying Living Expenses: Not hard at all   Food Insecurity: No Food Insecurity (2024)    Hunger Vital Sign     Worried About Running Out of Food in the Last Year: Never true     Ran Out of Food in the Last Year: Never true   Transportation Needs: Unknown (2024)    PRAPARE - Transportation     Lack of Transportation (Medical): Not on file     Lack of Transportation (Non-Medical): No   Physical Activity: Unknown (2023)    Exercise Vital Sign     Days of Exercise per Week: Patient declined     Minutes of Exercise per Session: 0 min   Stress: Not on file   Social Connections: Not on file   Intimate Partner Violence: Not on file   Housing Stability: Unknown (2024)    Housing Stability Vital Sign     Unable to Pay for Housing in the Last Year: Not on file     Number of Places Lived in the Last Year: Not on file     Unstable Housing in the Last Year: No       Family History   Problem Relation Age of Onset    Heart Disease Mother         pacemaker     Stroke Mother     Heart Attack Mother     Parkinsonism Mother     No Known Problems Father     Heart Attack Sister          at 63    Parkinsonism Maternal Grandmother     Heart Disease Maternal Grandfather          I have reviewed the patient's past medical history, past surgical history, allergies, medications, social and family history and I have made updates where appropriate.      Review of Systems  Positive responses are highlighted in bold    Constitutional:  Fever, Chills, Night Sweats, Fatigue, Unexpected changes in weight  Eyes:  Eye discharge, Eye pain, Eye redness, Visual disturbances   HENT:  Ear pain, Tinnitus,

## 2024-04-05 ENCOUNTER — COMMUNITY OUTREACH (OUTPATIENT)
Dept: FAMILY MEDICINE CLINIC | Age: 69
End: 2024-04-05

## 2024-04-05 PROBLEM — E66.9 OBESITY (BMI 30.0-34.9): Status: ACTIVE | Noted: 2020-03-04

## 2024-04-05 PROBLEM — E66.811 OBESITY (BMI 30.0-34.9): Status: ACTIVE | Noted: 2020-03-04

## 2024-04-05 PROBLEM — I83.811 VARICOSE VEINS OF LEG WITH PAIN, RIGHT: Status: ACTIVE | Noted: 2020-11-12

## 2024-04-05 PROBLEM — K58.0 IRRITABLE BOWEL SYNDROME WITH DIARRHEA: Status: ACTIVE | Noted: 2018-01-09

## 2024-04-05 NOTE — PROGRESS NOTES
Patient's HM shows they are overdue for Colorectal Screening.   Care Everywhere and  files searched.   updated 1/19/18 Colonoscopy.

## 2024-04-10 NOTE — PROGRESS NOTES
[facility]      Ryan Olivarez MD [facility]  General Surgery  New Patient Evaluation in Office  Pt Name: Chetna Borges  Date of Birth 1955   Today's Date: 4/17/2024  Medical Record Number: [id number]  Referring Provider: Rogelio Martinez APRN -*  Primary Care Provider: Rogelio Martinez APRN - CNP  Chief Complaint   Patient presents with    Surgical Consult     NP ref by Rogelio Martinez CNP - Ventral hernia     ASSESSMENT      Problem List Items Addressed This Visit       Coronary artery disease involving native coronary artery of native heart without angina pectoris    Relevant Orders    Hemoglobin and Hematocrit    Gastroesophageal reflux disease without esophagitis    Relevant Orders    Hemoglobin and Hematocrit    RESOLVED: Acute exacerbation of chronic obstructive pulmonary disease (COPD) (HCC)    Relevant Orders    Hemoglobin and Hematocrit    Obesity (BMI 30.0-34.9)    Relevant Orders    Hemoglobin and Hematocrit    Incisional hernia, without obstruction or gangrene - Primary    Relevant Orders    WA RPR AA HERNIA 1ST 3-10 CM NCRC8/STRANGULATED (Completed)    Hemoglobin and Hematocrit     There are no active hospital problems to display for this patient.        PLANS   Assessment & Plan   Schedule Chetna for incisional hernia repair with Mesh Dr Olivarez hernia repair  The risks, options and alternatives were discussed in the office.  Bleeding, infection, reoperation and recurrence were discussed.  Mesh infection specifically  was discussed, and the possible treatment options were discussed.  The possibility of inadvertent injury to other structures was also covered. The patient was given opportunity to ask questions. Once answered, the patient has agreed to proceed with surgery. (OPEN)  The risks, options and alternatives were discussed in the office.  Bleeding, infection, reoperation and recurrence were discussed.  Injury to other intra abdominal structures and possible conversion to an open

## 2024-04-17 ENCOUNTER — OFFICE VISIT (OUTPATIENT)
Dept: SURGERY | Age: 69
End: 2024-04-17
Payer: MEDICARE

## 2024-04-17 ENCOUNTER — TELEPHONE (OUTPATIENT)
Dept: SURGERY | Age: 69
End: 2024-04-17

## 2024-04-17 ENCOUNTER — HOSPITAL ENCOUNTER (OUTPATIENT)
Age: 69
Discharge: HOME | End: 2024-04-17
Payer: MEDICARE

## 2024-04-17 VITALS
WEIGHT: 197.8 LBS | DIASTOLIC BLOOD PRESSURE: 86 MMHG | HEART RATE: 67 BPM | HEIGHT: 63 IN | TEMPERATURE: 97.6 F | SYSTOLIC BLOOD PRESSURE: 148 MMHG | OXYGEN SATURATION: 96 % | BODY MASS INDEX: 35.05 KG/M2

## 2024-04-17 DIAGNOSIS — I25.10 CORONARY ARTERY DISEASE INVOLVING NATIVE CORONARY ARTERY OF NATIVE HEART WITHOUT ANGINA PECTORIS: ICD-10-CM

## 2024-04-17 DIAGNOSIS — K43.2 INCISIONAL HERNIA, WITHOUT OBSTRUCTION OR GANGRENE: Primary | ICD-10-CM

## 2024-04-17 DIAGNOSIS — E66.9 OBESITY (BMI 30.0-34.9): ICD-10-CM

## 2024-04-17 DIAGNOSIS — K21.9 GASTROESOPHAGEAL REFLUX DISEASE WITHOUT ESOPHAGITIS: ICD-10-CM

## 2024-04-17 DIAGNOSIS — J44.1 ACUTE EXACERBATION OF CHRONIC OBSTRUCTIVE PULMONARY DISEASE (COPD) (HCC): ICD-10-CM

## 2024-04-17 LAB
HCT VFR BLD AUTO: 44.4 % (ref 37–47)
HGB BLD-MCNC: 14.1 GM/DL (ref 12–16)

## 2024-04-17 PROCEDURE — 1123F ACP DISCUSS/DSCN MKR DOCD: CPT | Performed by: SURGERY

## 2024-04-17 PROCEDURE — 3017F COLORECTAL CA SCREEN DOC REV: CPT | Performed by: SURGERY

## 2024-04-17 PROCEDURE — G8399 PT W/DXA RESULTS DOCUMENT: HCPCS | Performed by: SURGERY

## 2024-04-17 PROCEDURE — 85018 HEMOGLOBIN: CPT

## 2024-04-17 PROCEDURE — 36415 COLL VENOUS BLD VENIPUNCTURE: CPT

## 2024-04-17 PROCEDURE — 3023F SPIROM DOC REV: CPT | Performed by: SURGERY

## 2024-04-17 PROCEDURE — 85014 HEMATOCRIT: CPT

## 2024-04-17 PROCEDURE — 99204 OFFICE O/P NEW MOD 45 MIN: CPT | Performed by: SURGERY

## 2024-04-17 PROCEDURE — G8427 DOCREV CUR MEDS BY ELIG CLIN: HCPCS | Performed by: SURGERY

## 2024-04-17 PROCEDURE — 4004F PT TOBACCO SCREEN RCVD TLK: CPT | Performed by: SURGERY

## 2024-04-17 PROCEDURE — G8417 CALC BMI ABV UP PARAM F/U: HCPCS | Performed by: SURGERY

## 2024-04-17 PROCEDURE — 1090F PRES/ABSN URINE INCON ASSESS: CPT | Performed by: SURGERY

## 2024-04-17 ASSESSMENT — ENCOUNTER SYMPTOMS
ANAL BLEEDING: 0
RECTAL PAIN: 0
SORE THROAT: 0
SINUS PRESSURE: 0
APNEA: 0
EYE REDNESS: 0
CHOKING: 0
EYE ITCHING: 0
SINUS PAIN: 0
SHORTNESS OF BREATH: 0
BACK PAIN: 0
BLOOD IN STOOL: 0
VOICE CHANGE: 0
NAUSEA: 1
WHEEZING: 0
FACIAL SWELLING: 0
VOMITING: 0
PHOTOPHOBIA: 0
COLOR CHANGE: 0
ABDOMINAL PAIN: 0
EYE DISCHARGE: 0
CONSTIPATION: 0
DIARRHEA: 1
EYE PAIN: 0
RHINORRHEA: 0
ABDOMINAL DISTENTION: 0
CHEST TIGHTNESS: 0
TROUBLE SWALLOWING: 0
STRIDOR: 0
COUGH: 0

## 2024-04-17 NOTE — PROGRESS NOTES
Subjective   Patient ID: Chetna Borges is a 68 y.o. female.  Chief Complaint   Patient presents with    Surgical Consult     NP ref by Rogelio Martinez, CNP - Ventral hernia       HPI    Review of Systems   Constitutional:  Positive for diaphoresis and fatigue. Negative for activity change, appetite change, chills, fever and unexpected weight change.   HENT:  Negative for congestion, dental problem, drooling, ear discharge, ear pain, facial swelling, hearing loss, mouth sores, nosebleeds, postnasal drip, rhinorrhea, sinus pressure, sinus pain, sneezing, sore throat, tinnitus, trouble swallowing and voice change.    Eyes:  Negative for photophobia, pain, discharge, redness, itching and visual disturbance.   Respiratory:  Negative for apnea, cough, choking, chest tightness, shortness of breath, wheezing and stridor.    Cardiovascular:  Negative for chest pain, palpitations and leg swelling.   Gastrointestinal:  Positive for diarrhea and nausea. Negative for abdominal distention, abdominal pain, anal bleeding, blood in stool, constipation, rectal pain and vomiting.   Endocrine: Negative for cold intolerance, heat intolerance, polydipsia, polyphagia and polyuria.   Genitourinary:  Negative for decreased urine volume, difficulty urinating, dyspareunia, dysuria, enuresis, flank pain, frequency, genital sores, hematuria, menstrual problem, pelvic pain, urgency, vaginal bleeding, vaginal discharge and vaginal pain.   Musculoskeletal:  Positive for arthralgias. Negative for back pain, gait problem, joint swelling, myalgias, neck pain and neck stiffness.   Skin:  Negative for color change, pallor, rash and wound.   Allergic/Immunologic: Negative for environmental allergies, food allergies and immunocompromised state.   Neurological:  Positive for headaches. Negative for dizziness, tremors, seizures, syncope, facial asymmetry, speech difficulty, weakness, light-headedness and numbness.   Hematological:  Negative for

## 2024-04-17 NOTE — TELEPHONE ENCOUNTER
Patient scheduled for surgery with Dr. Olivarez on 04- at 8:15am with an arrival of 6:30am.  Preop orders (H/H) and surgery instructions given.  Surgery consent signed.  Antibacterial soap given.

## 2024-04-17 NOTE — TELEPHONE ENCOUNTER
Pt calling saying she talk to a nurse about FMLA and she had some information. I didn't see encounter in here. She insisted she talk to a nurse . She would like a nurse to contact her. Pt # [phone]   Surgery Scheduling Form   [facility] [address], [place] [address]    Phone * [phone]   Surgical Scheduling Direct line Phone * [phone]  Fax * [phone]      Chetna Borges      1955    female    [address]  [facility] [address]   Marital Status:         Home Phone: [phone]   Cell Phone:   Telephone Information:   Mobile [phone]              Surgeon: Dr. Olivarez  Surgery Date:04-   Time: 8:15am (to follow his colonoscopy/ [place]     Procedure: Incisional hernia repair with mesh    Outpatient     Diagnosis: Incisional hernia    Important Medical History: In Epic    Special Inst/Equip:     CPT Codes: 70739    Latex Allergy:   no Cardiac Device:  no    Anesthesia Type: MAC/TIVA    Case Location:  Main OR     Preadmission Testing: Phone Call      PAT Date and Time: ________________________________    PAT Confirmation #: _________________________________    Post Op Visit:  ______________________________________    Need Preop Cardiac Clearance:   yes,     Does patient have Cardiologist/physician? Dr. Dawn      Surgery Conformation #:  ______________________________________________    : __________________________________ Date:____________________        Insurance Company Name:  Medicare

## 2024-04-17 NOTE — TELEPHONE ENCOUNTER
Patient scheduled for surgery with Dr. Olivarez on 04- for an incisional hernia repair with mesh under a MAC anesthesia.  Could you please ask Dr. Dawn if the patient is okay to proceed with this scheduled surgery?  Thank you

## 2024-04-26 ENCOUNTER — ANESTHESIA (OUTPATIENT)
Dept: OPERATING ROOM | Age: 69
End: 2024-04-26
Payer: MEDICARE

## 2024-04-26 ENCOUNTER — ANESTHESIA EVENT (OUTPATIENT)
Dept: OPERATING ROOM | Age: 69
End: 2024-04-26
Payer: MEDICARE

## 2024-04-26 ENCOUNTER — HOSPITAL ENCOUNTER (OUTPATIENT)
Age: 69
Setting detail: OUTPATIENT SURGERY
Discharge: HOME | End: 2024-04-26
Attending: SURGERY | Admitting: SURGERY
Payer: MEDICARE

## 2024-04-26 VITALS
SYSTOLIC BLOOD PRESSURE: 137 MMHG | HEART RATE: 64 BPM | BODY MASS INDEX: 34.8 KG/M2 | OXYGEN SATURATION: 96 % | RESPIRATION RATE: 20 BRPM | WEIGHT: 196.4 LBS | DIASTOLIC BLOOD PRESSURE: 70 MMHG | TEMPERATURE: 97 F | HEIGHT: 63 IN

## 2024-04-26 DIAGNOSIS — Z98.890 S/P VENTRAL HERNIORRHAPHY: Primary | ICD-10-CM

## 2024-04-26 DIAGNOSIS — Z87.19 S/P VENTRAL HERNIORRHAPHY: Primary | ICD-10-CM

## 2024-04-26 PROCEDURE — 2580000003 HC RX 258: Performed by: SURGERY

## 2024-04-26 PROCEDURE — 49592 RPR AA HRN 1ST < 3 NCR/STRN: CPT | Performed by: SURGERY

## 2024-04-26 PROCEDURE — 6360000002 HC RX W HCPCS: Performed by: SURGERY

## 2024-04-26 PROCEDURE — 6370000000 HC RX 637 (ALT 250 FOR IP): Performed by: ANESTHESIOLOGY

## 2024-04-26 PROCEDURE — 6360000002 HC RX W HCPCS: Performed by: NURSE ANESTHETIST, CERTIFIED REGISTERED

## 2024-04-26 PROCEDURE — 2500000003 HC RX 250 WO HCPCS: Performed by: SURGERY

## 2024-04-26 PROCEDURE — 6370000000 HC RX 637 (ALT 250 FOR IP): Performed by: SURGERY

## 2024-04-26 RX ORDER — HYDRALAZINE HYDROCHLORIDE 20 MG/ML
10 INJECTION INTRAMUSCULAR; INTRAVENOUS
Status: DISCONTINUED | OUTPATIENT
Start: 2024-04-26 | End: 2024-04-26 | Stop reason: HOSPADM

## 2024-04-26 RX ORDER — SODIUM CHLORIDE 0.9 % (FLUSH) 0.9 %
5-40 SYRINGE (ML) INJECTION EVERY 12 HOURS SCHEDULED
Status: DISCONTINUED | OUTPATIENT
Start: 2024-04-26 | End: 2024-04-26 | Stop reason: HOSPADM

## 2024-04-26 RX ORDER — MEPERIDINE HYDROCHLORIDE 25 MG/ML
12.5 INJECTION INTRAMUSCULAR; INTRAVENOUS; SUBCUTANEOUS ONCE
Status: DISCONTINUED | OUTPATIENT
Start: 2024-04-26 | End: 2024-04-26 | Stop reason: HOSPADM

## 2024-04-26 RX ORDER — GLUCAGON 1 MG/ML
1 KIT INJECTION PRN
Status: DISCONTINUED | OUTPATIENT
Start: 2024-04-26 | End: 2024-04-26 | Stop reason: HOSPADM

## 2024-04-26 RX ORDER — FENTANYL CITRATE 50 UG/ML
50 INJECTION, SOLUTION INTRAMUSCULAR; INTRAVENOUS EVERY 5 MIN PRN
Status: DISCONTINUED | OUTPATIENT
Start: 2024-04-26 | End: 2024-04-26 | Stop reason: HOSPADM

## 2024-04-26 RX ORDER — SODIUM CHLORIDE 0.9 % (FLUSH) 0.9 %
5-40 SYRINGE (ML) INJECTION PRN
Status: DISCONTINUED | OUTPATIENT
Start: 2024-04-26 | End: 2024-04-26 | Stop reason: HOSPADM

## 2024-04-26 RX ORDER — SODIUM CHLORIDE 9 MG/ML
INJECTION, SOLUTION INTRAVENOUS CONTINUOUS
Status: DISCONTINUED | OUTPATIENT
Start: 2024-04-26 | End: 2024-04-26 | Stop reason: HOSPADM

## 2024-04-26 RX ORDER — NALOXONE HYDROCHLORIDE 0.4 MG/ML
INJECTION, SOLUTION INTRAMUSCULAR; INTRAVENOUS; SUBCUTANEOUS PRN
Status: DISCONTINUED | OUTPATIENT
Start: 2024-04-26 | End: 2024-04-26 | Stop reason: HOSPADM

## 2024-04-26 RX ORDER — HYDROCODONE BITARTRATE AND ACETAMINOPHEN 5; 325 MG/1; MG/1
1 TABLET ORAL EVERY 6 HOURS PRN
Qty: 28 TABLET | Refills: 0 | Status: SHIPPED | OUTPATIENT
Start: 2024-04-26 | End: 2024-05-03

## 2024-04-26 RX ORDER — PROPOFOL 10 MG/ML
INJECTION, EMULSION INTRAVENOUS PRN
Status: DISCONTINUED | OUTPATIENT
Start: 2024-04-26 | End: 2024-04-26 | Stop reason: SDUPTHER

## 2024-04-26 RX ORDER — FENTANYL CITRATE 50 UG/ML
INJECTION, SOLUTION INTRAMUSCULAR; INTRAVENOUS PRN
Status: DISCONTINUED | OUTPATIENT
Start: 2024-04-26 | End: 2024-04-26 | Stop reason: SDUPTHER

## 2024-04-26 RX ORDER — ONDANSETRON 2 MG/ML
4 INJECTION INTRAMUSCULAR; INTRAVENOUS
Status: DISCONTINUED | OUTPATIENT
Start: 2024-04-26 | End: 2024-04-26 | Stop reason: HOSPADM

## 2024-04-26 RX ORDER — DEXTROSE MONOHYDRATE 100 MG/ML
INJECTION, SOLUTION INTRAVENOUS CONTINUOUS PRN
Status: DISCONTINUED | OUTPATIENT
Start: 2024-04-26 | End: 2024-04-26 | Stop reason: HOSPADM

## 2024-04-26 RX ORDER — DIPHENHYDRAMINE HYDROCHLORIDE 50 MG/ML
12.5 INJECTION INTRAMUSCULAR; INTRAVENOUS
Status: DISCONTINUED | OUTPATIENT
Start: 2024-04-26 | End: 2024-04-26 | Stop reason: HOSPADM

## 2024-04-26 RX ORDER — IBUPROFEN 400 MG/1
400 TABLET ORAL ONCE
Status: COMPLETED | OUTPATIENT
Start: 2024-04-26 | End: 2024-04-26

## 2024-04-26 RX ORDER — MIDAZOLAM HYDROCHLORIDE 1 MG/ML
INJECTION INTRAMUSCULAR; INTRAVENOUS PRN
Status: DISCONTINUED | OUTPATIENT
Start: 2024-04-26 | End: 2024-04-26 | Stop reason: SDUPTHER

## 2024-04-26 RX ORDER — ACETAMINOPHEN 325 MG/1
650 TABLET ORAL ONCE
Status: DISCONTINUED | OUTPATIENT
Start: 2024-04-26 | End: 2024-04-26 | Stop reason: HOSPADM

## 2024-04-26 RX ORDER — SODIUM CHLORIDE 9 MG/ML
INJECTION, SOLUTION INTRAVENOUS PRN
Status: DISCONTINUED | OUTPATIENT
Start: 2024-04-26 | End: 2024-04-26 | Stop reason: HOSPADM

## 2024-04-26 RX ORDER — DROPERIDOL 2.5 MG/ML
0.62 INJECTION, SOLUTION INTRAMUSCULAR; INTRAVENOUS
Status: DISCONTINUED | OUTPATIENT
Start: 2024-04-26 | End: 2024-04-26 | Stop reason: HOSPADM

## 2024-04-26 RX ORDER — IPRATROPIUM BROMIDE AND ALBUTEROL SULFATE 2.5; .5 MG/3ML; MG/3ML
1 SOLUTION RESPIRATORY (INHALATION)
Status: DISCONTINUED | OUTPATIENT
Start: 2024-04-26 | End: 2024-04-26 | Stop reason: HOSPADM

## 2024-04-26 RX ORDER — OXYCODONE HYDROCHLORIDE 5 MG/1
5 TABLET ORAL
Status: COMPLETED | OUTPATIENT
Start: 2024-04-26 | End: 2024-04-26

## 2024-04-26 RX ORDER — LABETALOL HYDROCHLORIDE 5 MG/ML
10 INJECTION, SOLUTION INTRAVENOUS
Status: DISCONTINUED | OUTPATIENT
Start: 2024-04-26 | End: 2024-04-26 | Stop reason: HOSPADM

## 2024-04-26 RX ORDER — LIDOCAINE HCL/PF 100 MG/5ML
SYRINGE (ML) INJECTION PRN
Status: DISCONTINUED | OUTPATIENT
Start: 2024-04-26 | End: 2024-04-26 | Stop reason: SDUPTHER

## 2024-04-26 RX ORDER — ACETAMINOPHEN 500 MG
1000 TABLET ORAL ONCE
Status: COMPLETED | OUTPATIENT
Start: 2024-04-26 | End: 2024-04-26

## 2024-04-26 RX ADMIN — PROPOFOL 50 MG: 10 INJECTION, EMULSION INTRAVENOUS at 08:22

## 2024-04-26 RX ADMIN — FENTANYL CITRATE 25 MCG: 50 INJECTION, SOLUTION INTRAMUSCULAR; INTRAVENOUS at 08:30

## 2024-04-26 RX ADMIN — OXYCODONE 5 MG: 5 TABLET ORAL at 09:31

## 2024-04-26 RX ADMIN — IBUPROFEN 400 MG: 400 TABLET, FILM COATED ORAL at 06:49

## 2024-04-26 RX ADMIN — ACETAMINOPHEN 1000 MG: 500 TABLET ORAL at 06:48

## 2024-04-26 RX ADMIN — PROPOFOL 50 MG: 10 INJECTION, EMULSION INTRAVENOUS at 08:09

## 2024-04-26 RX ADMIN — MIDAZOLAM 1 MG: 1 INJECTION INTRAMUSCULAR; INTRAVENOUS at 08:10

## 2024-04-26 RX ADMIN — WATER 2000 MG: 1 INJECTION INTRAMUSCULAR; INTRAVENOUS; SUBCUTANEOUS at 08:10

## 2024-04-26 RX ADMIN — FENTANYL CITRATE 50 MCG: 50 INJECTION, SOLUTION INTRAMUSCULAR; INTRAVENOUS at 08:05

## 2024-04-26 RX ADMIN — Medication 100 MG: at 08:09

## 2024-04-26 RX ADMIN — MIDAZOLAM 1 MG: 1 INJECTION INTRAMUSCULAR; INTRAVENOUS at 08:17

## 2024-04-26 RX ADMIN — SODIUM CHLORIDE: 9 INJECTION, SOLUTION INTRAVENOUS at 06:46

## 2024-04-26 ASSESSMENT — PAIN DESCRIPTION - LOCATION: LOCATION: ABDOMEN

## 2024-04-26 ASSESSMENT — PAIN - FUNCTIONAL ASSESSMENT
PAIN_FUNCTIONAL_ASSESSMENT: 0-10
PAIN_FUNCTIONAL_ASSESSMENT: 0-10

## 2024-04-26 ASSESSMENT — PAIN DESCRIPTION - DESCRIPTORS
DESCRIPTORS: DISCOMFORT
DESCRIPTORS: ACHING
DESCRIPTORS: ACHING

## 2024-04-26 ASSESSMENT — PAIN DESCRIPTION - ORIENTATION: ORIENTATION: MID;ANTERIOR

## 2024-04-26 ASSESSMENT — PAIN DESCRIPTION - FREQUENCY: FREQUENCY: CONTINUOUS

## 2024-04-26 ASSESSMENT — PAIN DESCRIPTION - PAIN TYPE: TYPE: SURGICAL PAIN

## 2024-04-26 ASSESSMENT — PAIN SCALES - GENERAL: PAINLEVEL_OUTOF10: 4

## 2024-04-26 NOTE — ANESTHESIA POSTPROCEDURE EVALUATION
Department of Anesthesiology  Postprocedure Note    Patient: Chetna Borges  MRN: [id number]  YOB: 1955  Date of evaluation: 4/26/2024    Procedure Summary       Date: 04/26/24 Room / Location: [facility] OR 03 / [facility] OR    Anesthesia Start: 0806 Anesthesia Stop: 0842    Procedure: INCISIONAL HERNIA REPAIR WITH MESH (Abdomen) Diagnosis:       Incisional hernia, without obstruction or gangrene      (Incisional hernia, without obstruction or gangrene [K43.2])    Surgeons: Ryan Olivarez MD Responsible Provider: Truman Maier DO    Anesthesia Type: general ASA Status: 2            Anesthesia Type: No value filed.    Wellington Phase I: Wellington Score: 10    Wellington Phase II: Wellington Score: 10    Anesthesia Post Evaluation    Patient location during evaluation: PACU  Patient participation: complete - patient participated  Level of consciousness: awake and alert  Airway patency: patent  Nausea & Vomiting: no nausea  Cardiovascular status: blood pressure returned to baseline and hemodynamically stable  Respiratory status: acceptable and spontaneous ventilation  Hydration status: euvolemic  Pain management: adequate    No notable events documented.

## 2024-04-26 NOTE — DISCHARGE INSTRUCTIONS
[facility]      DR. CRAVEN'S DISCHARGE INSTRUCTIONS    Pt Name: Chetna Borges  Medical Record Number: [id number]  Today's Date: 4/26/2024           GENERAL ANESTHESIA OR SEDATION:    [x]  Do not drive or operate hazardous machinery for 24 hours.  [x] Do not make important business or personal decisions for 24 hours.  [x] Do not drink alcoholic beverages or use tobacco for 24 hours.           ACTIVITY INSTRUCTIONS:    [] Rest today. Resume light to normal activity tomorrow.   [x] You may resume normal activity tomorrow. Do not engage in strenuous activity that may place stress on your incision.  [x] Do not drive  UNTIL NO LONGER TAKING NARCOTICS AND DAILY ACTIVITIES  ARE NEARLY NORMAL     [x]Avoid heavy lifting, tugging, pullings greater  than  25 lbs until seen in the office.               DIET INSTRUCTIONS:    []Begin with clear liquids. If not nauseated, may increase to a low-fat diet when you desire. Greasy and spicy foods are not advised.  [x]Regular diet as tolerated.  []Other:          MEDICATIONS  [x]Prescription sent with you to be used as directed.   []Lortab   [x]Norco   []Percocet   []Tylenol #3   []NONE              []Antibiotic              []Tramadol       Do not drink alcohol or drive while taking these medications.   You may experience dizziness or drowsiness with these medications.   You may also experience constipation which can be relieved with stool softners or laxatives.    [x]You may resume your daily prescription medication schedule unless otherwise specified.  []Do not take 325mg Aspirin or other blood thinners such as Coumadin or Plavix for 5 days.            WOUND/DRESSING INSTRUCTIONS:    Always ensure you and your care giver clean hands before and after caring for the wound.    [] Keep dressing clean and dry for 24 hours.       [] Allow steri-strips to fall off on their own.   [] Ice operative site for 20 minutes 4 times a day.     [x] May wash over incision in shower,

## 2024-04-26 NOTE — H&P
H and P for Surgery           [facility]  History and Physical Update    Pt Name: Chetna Borges  MRN: [id number]  YOB: 1955  Date of evaluation: 4/26/2024    [x] I have examined the patient and reviewed the H&P/Consult and there are no changes to the patient or plans.    [] I have examined the patient and reviewed the H&P/Consult and have noted the following changes:        Ryan Olivarez MD  Electronically signed 4/26/2024 at 6:44 AM            
  All other systems reviewed and are negati    OBJECTIVE    VITALS:  vitals were not taken for this visit.   CONSTITUTIONAL: Alert and oriented times 3, no acute distress and cooperative to examination with proper mood and affect.  SKIN: Skin color, texture, turgor normal. No rashes or lesions.  LYMPH: no cervical nodes, no inguinal nodes  HEENT: Head is normocephalic, atraumatic. EOMI, PERRLA.  NECK: Supple, symmetrical, trachea midline, no adenopathy, thyroid symmetric, not enlarged and no tenderness, skin normal.  CHEST/LUNGS: chest symmetric with normal A/P diameter, normal respiratory rate and rhythm, lungs clear to auscultation without wheezes, rales or rhonchi. No accessory muscle use. Scars None   CARDIOVASCULAR: Heart sounds are normal.  Regular rate and rhythm without murmur, gallop or rub. Normal S1 and S2.. Carotid and femoral pulses 2+/4 and equal bilaterally.  ABDOMEN: obese low midline scar from hysterectomy and Laparoscopic scar(s) present. Normal bowel sounds.  No bruits. . soft, nontender, nondistended, no masses or organomegaly. incisional hernia is present which is not reducible.  It is located just to the right of her epigastric port site from her laparoscopic cholecystectomy.  By palpation you can feel a ballotable subcutaneous mass it seems to partially reduce but he cannot get it all back in.  It does cause some discomfort when you are pressing in place.  It does not feel like intestine feels more like fat percussion: Normal without hepatosplenomegally. Tenderness: Present when trying to reduce the hernia.  RECTAL: deferred, not clinically indicated  NEUROLOGIC: There are no focalizing motor or sensory deficits. CN II-XII are grossly intact..   EXTREMITIES: no cyanosis, no clubbing, and no edema.      Thank you for the interesting evaluation. Further recommendations as listed above.         Electronically signed by Ryan Olivarez MD MD FACS on 4/23/2024 at 8:05 AM

## 2024-04-26 NOTE — ANESTHESIA PRE PROCEDURE
Department of Anesthesiology  Preprocedure Note       Name:  Chetna Borges   Age:  68 y.o.  :  1955                                          MRN:  [id number]         Date:  2024      Surgeon: Surgeon(s):  Ryan Olivarez MD    Procedure: Procedure(s):  INCISIONAL HERNIA REPAIR WITH MESH    Medications prior to admission:   Prior to Admission medications    Medication Sig Start Date End Date Taking? Authorizing Provider   escitalopram (LEXAPRO) 20 MG tablet Take 1 tablet by mouth daily 24   Rogelio Martinez APRN - CNP   gabapentin (NEURONTIN) 800 MG tablet Take 1 tablet by mouth 3 times daily for 30 days. 3/28/24 4/27/24  Rogelio Martinez APRN - CNP   celecoxib (CELEBREX) 100 MG capsule Take 1 capsule by mouth 2 times daily 3/8/24   Rogelio Martinez APRN - CNP   amitriptyline (ELAVIL) 10 MG tablet Take 1 tablet by mouth nightly 24   Rogelio Martinez APRN - CNP   zoledronic acid (RECLAST) 5 MG/100ML SOLN Infuse 100 mLs intravenously once for 1 dose 24  Rogelio Martinez APRN - CNP   dilTIAZem (DILT-XR) 120 MG extended release capsule Take 1 capsule by mouth one time daily to help control blood pressure. 23   Francine Dawn MD   cyanocobalamin 1000 MCG/ML injection Inject 1 mL into the muscle every 14 days TO REPLACE B 12. 10/19/23   Sherlyn Carson MD   Spacer/Aero-Holding Chambers SHELDON 1 Device by Does not apply route daily as needed (use with inhaler) 23   Khadra Jacinto DO   albuterol sulfate HFA (VENTOLIN HFA) 108 (90 Base) MCG/ACT inhaler Inhale 2 puffs into the lungs every 6 hours as needed for Wheezing 3/17/23   Sherlyn Carson MD   cetirizine (ZYRTEC) 10 MG tablet TAKE ONE TABLET EACH DAY to help control sinus and allergies **DRINK PLENTY OF WATER** 23   Sherlyn Carson MD   omeprazole (PRILOSEC) 40 MG delayed release capsule Take 1 capsule by mouth daily 1 tab x 2 daily 23   Sherlyn Carson MD   Syringe/Needle, Disp, (SYRINGE 3CC/25GX1\") 25G X 1\" 3 ML MISC 1 mL  Date/Time    HEPCAB Negative 10/11/2023 11:02 AM       COVID-19 Screening (If Applicable): No results found for: \"COVID19\"        Anesthesia Evaluation  Patient summary reviewed and Nursing notes reviewed   no history of anesthetic complications:   Airway: Mallampati: II          Dental:          Pulmonary: breath sounds clear to auscultation  (+)     sleep apnea: on CPAP,                                  Cardiovascular:  Exercise tolerance: good (>4 METS)  (+) CAD:      ECG reviewed  Rhythm: regular  Rate: normal                    Neuro/Psych:   (+) psychiatric history:            GI/Hepatic/Renal:   (+) GERD: no interval change          Endo/Other:                     Abdominal:   (+) obese          Vascular:          Other Findings:       Anesthesia Plan      general     ASA 2       Induction: intravenous.    MIPS: Postoperative opioids intended and Prophylactic antiemetics administered.  Anesthetic plan and risks discussed with patient and spouse.      Plan discussed with HARESH.                CAMPOS SMALL DO   4/26/2024

## 2024-04-26 NOTE — OP NOTE
[facility]  Operative Report    PATIENT NAME: Chetna Borges  MEDICAL RECORD NO. [id number]  SURGEON: Ryan Olivarez MD MD FACS  Primary Care Physician: Rogelio Martinez, APRN - CNP  Date: 4/26/2024, 9:21 AM     PROCEDURE PERFORMED: Ventral hernia repair with a 4.3 cm  Ventralex patch for 2  cm defect.  PREOPERATIVE DIAGNOSIS: Ventral Hernia not reducible  POSTOPERATIVE DIAGNOSIS: Same  SURGEON:  Ryan Olivarez MD MD FACS  ANESTHESIA:  Monitored Local Anesthesia with Sedation and local  ANESTHESIA: 0.5 % Marcaine in equal parts  20 mls used  ESTIMATED BLOOD LOSS:  0  ml  SPECIMEN:  none  COMPLICATIONS:  None; patient tolerated the procedure well.  DISPOSITION: Outpatient Surgery  CONDITION: stable         DISCUSSION:  The patient presented to my office and seen in evaluation regarding a ventral hernia. After history and physical examination was performed potential diagnostic and therapeutic modalities were discussed with the patient.  Operative and nonoperative management was discussed.  Risks, complications, and benefits were reviewed.  Pt was given the opportunity to ask questions, once answered informed consent was obtained.       OPERATIVE FINDINGS:  At the time of exploration the patient was found to have  a defect in the previous incision which was repaired using a mesh patch  technique as described below.     PROCEDURE:  Patient was brought to the operating room, placed in a supine  position, placed under continuous cardiac telemetry, blood pressure, and  pulse oximetry monitoring.  Placed under Local anesthesia with sedation by the anesthesia  department.  A Time out was taken. The anterior abdominal wall was prepped and draped in a sterile fashion.  Midline incision made over the top of the herniated defect and  carried down the subcutaneous tissues.  Subcutaneous tissue dissection was  carried out with electrocautery.  The herniated contents were noted to be  in the subcutaneous

## 2024-04-26 NOTE — PROGRESS NOTES
Patient admitted to [facility] room 8 with son at bedside. Bed in low position side rails up call light in reach. Patient denies questions at this time.

## 2024-04-29 ENCOUNTER — HOSPITAL ENCOUNTER (OUTPATIENT)
Dept: MRI IMAGING | Age: 69
Discharge: HOME | End: 2024-04-29
Payer: MEDICARE

## 2024-04-29 ENCOUNTER — TELEPHONE (OUTPATIENT)
Dept: SURGERY | Age: 69
End: 2024-04-29

## 2024-04-29 DIAGNOSIS — M17.11 PRIMARY OSTEOARTHRITIS OF RIGHT KNEE: ICD-10-CM

## 2024-04-29 DIAGNOSIS — M25.361 INSTABILITY OF RIGHT KNEE JOINT: ICD-10-CM

## 2024-04-29 DIAGNOSIS — M25.561 RIGHT KNEE PAIN, UNSPECIFIED CHRONICITY: ICD-10-CM

## 2024-04-29 PROCEDURE — 73721 MRI JNT OF LWR EXTRE W/O DYE: CPT

## 2024-04-30 ENCOUNTER — TELEPHONE (OUTPATIENT)
Dept: FAMILY MEDICINE CLINIC | Age: 69
End: 2024-04-30

## 2024-04-30 ENCOUNTER — PROCEDURE VISIT (OUTPATIENT)
Dept: NEUROLOGY | Age: 69
End: 2024-04-30
Payer: MEDICARE

## 2024-04-30 DIAGNOSIS — M25.561 RIGHT KNEE PAIN, UNSPECIFIED CHRONICITY: Primary | ICD-10-CM

## 2024-04-30 DIAGNOSIS — M79.601 BILATERAL ARM PAIN: Primary | ICD-10-CM

## 2024-04-30 DIAGNOSIS — M25.361 INSTABILITY OF RIGHT KNEE JOINT: ICD-10-CM

## 2024-04-30 DIAGNOSIS — M54.12 CERVICAL RADICULOPATHY: ICD-10-CM

## 2024-04-30 DIAGNOSIS — M79.602 BILATERAL ARM PAIN: Primary | ICD-10-CM

## 2024-04-30 DIAGNOSIS — M22.41 PATELLOFEMORAL CHONDROSIS OF RIGHT KNEE: ICD-10-CM

## 2024-04-30 PROCEDURE — 95911 NRV CNDJ TEST 9-10 STUDIES: CPT | Performed by: PSYCHIATRY & NEUROLOGY

## 2024-04-30 PROCEDURE — 95886 MUSC TEST DONE W/N TEST COMP: CPT | Performed by: PSYCHIATRY & NEUROLOGY

## 2024-04-30 NOTE — TELEPHONE ENCOUNTER
----- Message from SAMRA Hill - CNP sent at 4/30/2024  1:20 PM EDT -----  Let Chetna know her MRI of her knee does show inflammation and damage to an area of cartilage underneath her kneecap (patella). Also shows some mild soft tissue swelling, but otherwise her ligaments and tendons are intact. I would consider sending her to orthopedic though to have them evaluate the issue with her kneecap. Is this ok with her?

## 2024-05-01 PROBLEM — M22.41 PATELLOFEMORAL CHONDROSIS OF RIGHT KNEE: Status: ACTIVE | Noted: 2024-05-01

## 2024-05-01 NOTE — TELEPHONE ENCOUNTER
Pt informed and verbalized understanding.   Pt would like referral to [facility]-no preference on providers

## 2024-05-02 ENCOUNTER — PROCEDURE VISIT (OUTPATIENT)
Dept: NEUROLOGY | Age: 69
End: 2024-05-02

## 2024-05-02 DIAGNOSIS — M54.9 BACK PAIN WITH SCIATICA: ICD-10-CM

## 2024-05-02 DIAGNOSIS — M54.30 BACK PAIN WITH SCIATICA: ICD-10-CM

## 2024-05-02 DIAGNOSIS — M79.605 BILATERAL LEG PAIN: ICD-10-CM

## 2024-05-02 DIAGNOSIS — M54.16 LUMBAR RADICULOPATHY: Primary | ICD-10-CM

## 2024-05-02 DIAGNOSIS — M79.604 BILATERAL LEG PAIN: ICD-10-CM

## 2024-05-06 DIAGNOSIS — Z98.890 S/P VENTRAL HERNIORRHAPHY: ICD-10-CM

## 2024-05-06 DIAGNOSIS — Z87.19 S/P VENTRAL HERNIORRHAPHY: ICD-10-CM

## 2024-05-06 RX ORDER — HYDROCODONE BITARTRATE AND ACETAMINOPHEN 5; 325 MG/1; MG/1
1 TABLET ORAL EVERY 6 HOURS PRN
Qty: 28 TABLET | Refills: 0 | Status: SHIPPED | OUTPATIENT
Start: 2024-05-06 | End: 2024-05-13

## 2024-05-06 NOTE — TELEPHONE ENCOUNTER
S/P Ventral hernia repair with a 4.3 cm  Ventralex patch for 2  cm defect. 4/26/24    Patient called to office with concern for \"golf ball sized bulge\" above hernia repair site. Denies drainage, tender to touch, afebrile. Had been applying ice with no improvement. Bowel pattern uninhibited. Due to return of pain, patient requesting refill for pain medication hydrocodone, if approved send to [facility], [place] - [address] - P [phone] - F [phone] [[id number]]     Patient does have follow up appointment scheduled for Monday 5/13/24, patient inquiring if she needs to be seen sooner?

## 2024-05-13 ENCOUNTER — OFFICE VISIT (OUTPATIENT)
Dept: FAMILY MEDICINE CLINIC | Age: 69
End: 2024-05-13
Payer: MEDICARE

## 2024-05-13 ENCOUNTER — OFFICE VISIT (OUTPATIENT)
Dept: SURGERY | Age: 69
End: 2024-05-13

## 2024-05-13 VITALS
DIASTOLIC BLOOD PRESSURE: 88 MMHG | BODY MASS INDEX: 33.84 KG/M2 | TEMPERATURE: 97.1 F | WEIGHT: 191 LBS | RESPIRATION RATE: 20 BRPM | HEART RATE: 70 BPM | SYSTOLIC BLOOD PRESSURE: 124 MMHG | HEIGHT: 63 IN | OXYGEN SATURATION: 96 %

## 2024-05-13 VITALS
TEMPERATURE: 98 F | DIASTOLIC BLOOD PRESSURE: 80 MMHG | BODY MASS INDEX: 33.81 KG/M2 | OXYGEN SATURATION: 98 % | WEIGHT: 190.8 LBS | HEIGHT: 63 IN | SYSTOLIC BLOOD PRESSURE: 142 MMHG | HEART RATE: 44 BPM

## 2024-05-13 DIAGNOSIS — M54.16 LUMBAR RADICULOPATHY: ICD-10-CM

## 2024-05-13 DIAGNOSIS — Z87.19 S/P VENTRAL HERNIORRHAPHY: Primary | ICD-10-CM

## 2024-05-13 DIAGNOSIS — M22.41 PATELLOFEMORAL CHONDROSIS OF RIGHT KNEE: ICD-10-CM

## 2024-05-13 DIAGNOSIS — R51.9 ACUTE NONINTRACTABLE HEADACHE, UNSPECIFIED HEADACHE TYPE: Primary | ICD-10-CM

## 2024-05-13 DIAGNOSIS — F33.41 RECURRENT MAJOR DEPRESSIVE DISORDER, IN PARTIAL REMISSION (HCC): ICD-10-CM

## 2024-05-13 DIAGNOSIS — M25.561 RIGHT KNEE PAIN, UNSPECIFIED CHRONICITY: ICD-10-CM

## 2024-05-13 DIAGNOSIS — Z98.890 S/P VENTRAL HERNIORRHAPHY: Primary | ICD-10-CM

## 2024-05-13 PROCEDURE — G8417 CALC BMI ABV UP PARAM F/U: HCPCS | Performed by: NURSE PRACTITIONER

## 2024-05-13 PROCEDURE — 99214 OFFICE O/P EST MOD 30 MIN: CPT | Performed by: NURSE PRACTITIONER

## 2024-05-13 PROCEDURE — 99024 POSTOP FOLLOW-UP VISIT: CPT | Performed by: SURGERY

## 2024-05-13 PROCEDURE — 1090F PRES/ABSN URINE INCON ASSESS: CPT | Performed by: NURSE PRACTITIONER

## 2024-05-13 PROCEDURE — 1123F ACP DISCUSS/DSCN MKR DOCD: CPT | Performed by: NURSE PRACTITIONER

## 2024-05-13 PROCEDURE — G8399 PT W/DXA RESULTS DOCUMENT: HCPCS | Performed by: NURSE PRACTITIONER

## 2024-05-13 PROCEDURE — G8427 DOCREV CUR MEDS BY ELIG CLIN: HCPCS | Performed by: NURSE PRACTITIONER

## 2024-05-13 PROCEDURE — 3017F COLORECTAL CA SCREEN DOC REV: CPT | Performed by: NURSE PRACTITIONER

## 2024-05-13 PROCEDURE — 4004F PT TOBACCO SCREEN RCVD TLK: CPT | Performed by: NURSE PRACTITIONER

## 2024-05-13 RX ORDER — GABAPENTIN 800 MG/1
800 TABLET ORAL 3 TIMES DAILY
Qty: 270 TABLET | Refills: 1 | Status: SHIPPED | OUTPATIENT
Start: 2024-05-13 | End: 2024-11-09

## 2024-05-13 RX ORDER — ESCITALOPRAM OXALATE 20 MG/1
20 TABLET ORAL DAILY
Qty: 90 TABLET | Refills: 1 | Status: SHIPPED | OUTPATIENT
Start: 2024-05-13

## 2024-05-13 RX ORDER — BUTALBITAL, ACETAMINOPHEN AND CAFFEINE 50; 325; 40 MG/1; MG/1; MG/1
1 TABLET ORAL EVERY 4 HOURS PRN
Qty: 20 TABLET | Refills: 0 | Status: SHIPPED | OUTPATIENT
Start: 2024-05-13

## 2024-05-13 NOTE — PROGRESS NOTES
[facility] at [facility]  [address] [place] [address]  Chief Complaint   Patient presents with    Depression     No issues.        History obtained from chart review and the patient.    SUBJECTIVE:  Chetna Borges is a 68 y.o. female that presents today for follow up depression    Her leg pain is better  The dose increase of the Gabapentin did seem to help  She hasn't heard from [facility] yet  She also talked with her pain management doctor about her knee and he offered her injections in her knee, so she'll probably do this first    MDD  Taking Lexapro 20 mg  Moods are better than they were  Irritability is a lot better  Denies any SE, denies any SI/HI    Has a HA today-she tried motrin but it didn't help  Started yesterday when gujelani were working on her roof pounding on the roof    Age/Gender Health Maintenance    Lipid -   Lab Results   Component Value Date    CHOL 186 02/14/2023    TRIG 109 02/14/2023    HDL 64 01/26/2024     DM Screen -   Hemoglobin A1C   Date Value Ref Range Status   10/11/2023 5.9 4.4 - 6.4 % Final     Colon Cancer Screening - needs  Lung Cancer Screening (Age 55 to 80 with 30 pack year hx, current smoker or quit within past 15 years) - n/a    Tetanus - next due 9/8/29  Influenza Vaccine - yearly  Pneumonia Vaccine - needs  Zostavax - complete   HPV Vaccine - n/a    Breast Cancer Screening - 12/28/23  Cervical Cancer Screening - n/a  Osteoporosis Screening - 12/28/23  Chlamydia Screen - n/a      Current Outpatient Medications   Medication Sig Dispense Refill    escitalopram (LEXAPRO) 20 MG tablet Take 1 tablet by mouth daily 90 tablet 1    gabapentin (NEURONTIN) 800 MG tablet Take 1 tablet by mouth 3 times daily for 180 days. 270 tablet 1    butalbital-acetaminophen-caffeine (FIORICET, ESGIC) -40 MG per tablet Take 1 tablet by mouth every 4 hours as needed for Headaches 20 tablet 0    HYDROcodone-acetaminophen (NORCO) 5-325 MG per tablet Take 1 tablet by mouth every 6 hours

## 2024-05-13 NOTE — PROGRESS NOTES
[facility]    Ryan Olivarez MD FACS  General Surgery  Postprocedure Evaluation in Office  Pt Name: Chetna Borges  Date of Birth 1955   Today's Date: 5/13/2024  Medical Record Number: [id number]  Referring Provider: No ref. provider found  Primary Care Provider: Rogelio Martinez APRN - CNP  Chief Complaint   Patient presents with    Follow Up After Procedure     S/p  Ventral hernia repair with a 4.3 cm  Ventralex patch for 2  cm defect 4/26/24     ASSESSMENT      Problem List Items Addressed This Visit       S/P ventral herniorrhaphy - Primary        PLANS   Assessment & Plan    Pathology reviewed with the patient who understands. All questions were answered.  New Prescriptions    No medications on file     Patient Instructions   May return to full activity without restrictions.      Follow up: No follow-ups on file.  Orders Placed This Encounter:  No orders of the defined types were placed in this encounter.        PASCUAL Basilio is seen today for post-op follow-up. She is 17 day(s) status post open incisional hernia repair. She called reporting a fullness at the surgical site 5/6. Golf ball size . I felt sroma and healing ridge.  . She is tolerating a regular diet, having regular bowel movements. Symptoms and activity have gradually improved compared to preoperative. The surgical site is clean and has no drainage. Pain is controlled without any medications.. She has compliant with postoperative instructions.  Past Medical History  Past Medical History:   Diagnosis Date    Age-related osteoporosis without current pathological fracture 2015    Coronary artery disease involving native coronary artery of native heart without angina pectoris 1997    s/p angioplasty 1997    Depression 2015    GERD without esophagitis 2006    Idiopathic peripheral neuropathy 2004    Lumbar degenerative disc disease 2015    Sleep apnea 2000    uvulectomy/oral surgery    Vitamin B12 deficiency 2004

## 2024-05-20 ENCOUNTER — TELEPHONE (OUTPATIENT)
Dept: FAMILY MEDICINE CLINIC | Age: 69
End: 2024-05-20

## 2024-05-20 DIAGNOSIS — M54.12 CERVICAL RADICULOPATHY: Primary | ICD-10-CM

## 2024-05-20 DIAGNOSIS — M54.16 LUMBAR RADICULOPATHY: ICD-10-CM

## 2024-05-20 NOTE — TELEPHONE ENCOUNTER
Patient informed and verbalized understanding. Patient would like a ortho referral sent to Dr Ryan Choe out of [place]

## 2024-05-20 NOTE — TELEPHONE ENCOUNTER
Diagnosis Orders   1. Cervical radiculopathy  External Referral To Orthopedic Surgery      2. Lumbar radiculopathy  External Referral To Orthopedic Surgery

## 2024-05-20 NOTE — TELEPHONE ENCOUNTER
----- Message from Sudhir Cottrell, DO sent at 5/20/2024  7:03 AM EDT -----  Please let pt know that EMG shows nerve irritation coming from both neck and low back  Otherwise normal  Recommend we get her setup with ortho-spine eval.   Does she have a preference on whom she would see? Let us know, thanks!

## 2024-06-17 ENCOUNTER — TELEPHONE (OUTPATIENT)
Dept: ADMINISTRATIVE | Age: 69
End: 2024-06-17

## 2024-06-17 RX ORDER — DILTIAZEM HYDROCHLORIDE 120 MG/1
CAPSULE, EXTENDED RELEASE ORAL
Qty: 90 CAPSULE | Refills: 2 | Status: SHIPPED | OUTPATIENT
Start: 2024-06-17

## 2024-06-17 NOTE — TELEPHONE ENCOUNTER
Office note was reviewed, no cardiac complaints  Stress test 06/2022 was negative for ischemia  Echo 6/2022 revealed preserved EF, grade I DD, Mild AI  Call patient and inquire about any new cardiac events of complaints since prior visit. If none reported, may proceed with low cardiac risk

## 2024-06-17 NOTE — TELEPHONE ENCOUNTER
Pt is calling and is having surgery on 6/28/2024 and needs a clearance for the surgery.  She states was last seen in January and wants to see if she needs seen again or if a clearance can just be done for her.  Please advise pt at [phone]

## 2024-06-20 ENCOUNTER — OFFICE VISIT (OUTPATIENT)
Dept: FAMILY MEDICINE CLINIC | Age: 69
End: 2024-06-20

## 2024-06-20 VITALS
BODY MASS INDEX: 33.95 KG/M2 | OXYGEN SATURATION: 95 % | SYSTOLIC BLOOD PRESSURE: 128 MMHG | HEART RATE: 95 BPM | RESPIRATION RATE: 12 BRPM | HEIGHT: 63 IN | WEIGHT: 191.6 LBS | TEMPERATURE: 97.1 F | DIASTOLIC BLOOD PRESSURE: 60 MMHG

## 2024-06-20 DIAGNOSIS — M48.062 SPINAL STENOSIS, LUMBAR REGION WITH NEUROGENIC CLAUDICATION: ICD-10-CM

## 2024-06-20 DIAGNOSIS — L05.01 PILONIDAL ABSCESS: ICD-10-CM

## 2024-06-20 DIAGNOSIS — Z01.818 PRE-OP EXAMINATION: Primary | ICD-10-CM

## 2024-06-20 DIAGNOSIS — I25.10 CORONARY ARTERY DISEASE INVOLVING NATIVE CORONARY ARTERY OF NATIVE HEART WITHOUT ANGINA PECTORIS: ICD-10-CM

## 2024-06-20 RX ORDER — DOXYCYCLINE HYCLATE 100 MG
100 TABLET ORAL 2 TIMES DAILY
Qty: 20 TABLET | Refills: 0 | Status: SHIPPED | OUTPATIENT
Start: 2024-06-20 | End: 2024-06-30

## 2024-06-20 NOTE — PROGRESS NOTES
Chetna Borges is a 69 y.o. female that presents for Pre-op Exam (Having surgery with Dr Muñoz on 06/28/24. States taking bone spurs off of spine. States [facility] was suppose to send pre-op form. )        At the request of Dr. Muñoz Chetna Borges presents for pre-operative evaluation for her surgery.    Planned Surgery: bone spur removal from lumbar spine    She also has an area on her tailbone which hurts and is also painful    Patient Active Problem List    Diagnosis Date Noted    Incisional hernia, without obstruction or gangrene 04/17/2024    Patellofemoral chondrosis of right knee 05/01/2024    S/P ventral herniorrhaphy 04/26/2024    Osteopenia of multiple sites 01/23/2024    Cigarette nicotine dependence without complication 01/22/2024    Coronary artery disease involving native coronary artery of native heart without angina pectoris 04/04/2022    Osteopenia 04/04/2022    Anxiety and depression 04/04/2022    Idiopathic peripheral neuropathy 04/04/2022    Gastroesophageal reflux disease without esophagitis 04/04/2022    Varicose veins of leg with pain, right 11/12/2020    Obesity (BMI 30.0-34.9) 03/04/2020    Irritable bowel syndrome with diarrhea 01/09/2018       PREOPERATIVE FAMILY HISTORY  - She reports that she does not have a history of bad reaction to anesthesia.  -she does not have a family history of bleeding problems such as hemophilia, or [place] disease, or von Willebrand disease.    PREOPERATIVE ALLERGIES QUESTION:  No Known Allergies      PREOPERATIVE MEDICATION QUESTION:  Current Outpatient Medications   Medication Sig Dispense Refill    doxycycline hyclate (VIBRA-TABS) 100 MG tablet Take 1 tablet by mouth 2 times daily for 10 days 20 tablet 0    dilTIAZem (DILT-XR) 120 MG extended release capsule Take 1 capsule by mouth one time daily to help control blood pressure. 90 capsule 2    escitalopram (LEXAPRO) 20 MG tablet Take 1 tablet by mouth daily 90 tablet 1    gabapentin (NEURONTIN) 800 MG

## 2024-06-25 ENCOUNTER — TELEPHONE (OUTPATIENT)
Dept: FAMILY MEDICINE CLINIC | Age: 69
End: 2024-06-25

## 2024-06-25 ENCOUNTER — HOSPITAL ENCOUNTER (OUTPATIENT)
Age: 69
Discharge: HOME | End: 2024-06-25
Payer: MEDICARE

## 2024-06-25 ENCOUNTER — HOSPITAL ENCOUNTER (OUTPATIENT)
Dept: GENERAL RADIOLOGY | Age: 69
Discharge: HOME | End: 2024-06-25
Payer: MEDICARE

## 2024-06-25 DIAGNOSIS — Z01.818 PRE-OP EXAMINATION: ICD-10-CM

## 2024-06-25 LAB
ANION GAP SERPL CALC-SCNC: 7 MEQ/L (ref 8–16)
APTT PPP: 35 SECONDS (ref 22–38)
BASOPHILS ABSOLUTE: 0.1 THOU/MM3 (ref 0–0.1)
BASOPHILS NFR BLD AUTO: 1 %
BUN SERPL-MCNC: 6 MG/DL (ref 7–22)
CALCIUM SERPL-MCNC: 8.9 MG/DL (ref 8.5–10.5)
CHLORIDE SERPL-SCNC: 102 MEQ/L (ref 98–111)
CO2 SERPL-SCNC: 31 MEQ/L (ref 23–33)
CREAT SERPL-MCNC: 0.5 MG/DL (ref 0.4–1.2)
DEPRECATED RDW RBC AUTO: 50.9 FL (ref 35–45)
EOSINOPHIL NFR BLD AUTO: 1.4 %
EOSINOPHILS ABSOLUTE: 0.1 THOU/MM3 (ref 0–0.4)
ERYTHROCYTE [DISTWIDTH] IN BLOOD BY AUTOMATED COUNT: 14.3 % (ref 11.5–14.5)
GFR SERPL CREATININE-BSD FRML MDRD: > 90 ML/MIN/1.73M2
GLUCOSE SERPL-MCNC: 107 MG/DL (ref 70–108)
HCT VFR BLD AUTO: 45.3 % (ref 37–47)
HGB BLD-MCNC: 14.5 GM/DL (ref 12–16)
IMM GRANULOCYTES # BLD AUTO: 0.02 THOU/MM3 (ref 0–0.07)
IMM GRANULOCYTES NFR BLD AUTO: 0.3 %
INR PPP: 0.83 (ref 0.85–1.13)
LYMPHOCYTES ABSOLUTE: 1.9 THOU/MM3 (ref 1–4.8)
LYMPHOCYTES NFR BLD AUTO: 25.8 %
MCH RBC QN AUTO: 30.9 PG (ref 26–33)
MCHC RBC AUTO-ENTMCNC: 32 GM/DL (ref 32.2–35.5)
MCV RBC AUTO: 96.4 FL (ref 81–99)
MONOCYTES ABSOLUTE: 0.4 THOU/MM3 (ref 0.4–1.3)
MONOCYTES NFR BLD AUTO: 5.8 %
NEUTROPHILS ABSOLUTE: 4.8 THOU/MM3 (ref 1.8–7.7)
NEUTROPHILS NFR BLD AUTO: 65.7 %
NRBC BLD AUTO-RTO: 0 /100 WBC
PLATELET # BLD AUTO: 287 THOU/MM3 (ref 130–400)
PMV BLD AUTO: 9.6 FL (ref 9.4–12.4)
POTASSIUM SERPL-SCNC: 4.2 MEQ/L (ref 3.5–5.2)
RBC # BLD AUTO: 4.7 MILL/MM3 (ref 4.2–5.4)
SODIUM SERPL-SCNC: 140 MEQ/L (ref 135–145)
WBC # BLD AUTO: 7.3 THOU/MM3 (ref 4.8–10.8)

## 2024-06-25 PROCEDURE — 36415 COLL VENOUS BLD VENIPUNCTURE: CPT

## 2024-06-25 PROCEDURE — 80048 BASIC METABOLIC PNL TOTAL CA: CPT

## 2024-06-25 PROCEDURE — 71046 X-RAY EXAM CHEST 2 VIEWS: CPT

## 2024-06-25 PROCEDURE — 85730 THROMBOPLASTIN TIME PARTIAL: CPT

## 2024-06-25 PROCEDURE — 85025 COMPLETE CBC W/AUTO DIFF WBC: CPT

## 2024-06-25 PROCEDURE — 85610 PROTHROMBIN TIME: CPT

## 2024-06-25 NOTE — TELEPHONE ENCOUNTER
----- Message from SAMRA Hill - CNP sent at 6/25/2024 11:15 AM EDT -----  Let Chetna know her labs and chest xray are normal. We'll fax over her preop clearance today. Also, how is the spot on her tailbone doing? Is it feeling better?

## 2024-06-25 NOTE — TELEPHONE ENCOUNTER
Called patient and informed of message.   Patient verbalized understanding.  No questions or concerns at this time.    Patient states spot on tailbone is doing better.   Doesn't itch anymore, feels smaller, denies discharge, states it actually seems to be drying up. Patient is still taking ATB as prescribed.     Faxed preop clearance with office visit notes, EKG tracing, labs and chest x ray results to [facility]. Fax sent to [phone] and [phone].

## 2024-08-26 ENCOUNTER — PATIENT MESSAGE (OUTPATIENT)
Dept: FAMILY MEDICINE CLINIC | Age: 69
End: 2024-08-26

## 2024-08-26 DIAGNOSIS — M25.511 ACUTE PAIN OF RIGHT SHOULDER: Primary | ICD-10-CM

## 2024-08-26 RX ORDER — HYDROCODONE BITARTRATE AND ACETAMINOPHEN 5; 325 MG/1; MG/1
1 TABLET ORAL EVERY 6 HOURS PRN
Qty: 20 TABLET | Refills: 0 | Status: SHIPPED | OUTPATIENT
Start: 2024-08-26 | End: 2024-08-31

## 2024-08-26 NOTE — TELEPHONE ENCOUNTER
Controlled Substance Monitoring:    Acute and Chronic Pain Monitoring:   RX Monitoring Periodic Controlled Substance Monitoring   8/26/2024  10:47 AM No signs of potential drug abuse or diversion identified.

## 2024-09-12 ENCOUNTER — PATIENT MESSAGE (OUTPATIENT)
Dept: FAMILY MEDICINE CLINIC | Age: 69
End: 2024-09-12

## 2024-09-12 DIAGNOSIS — M54.12 CERVICAL RADICULOPATHY: Primary | ICD-10-CM

## 2024-09-12 RX ORDER — TRAMADOL HYDROCHLORIDE 50 MG/1
50 TABLET ORAL EVERY 6 HOURS PRN
Qty: 28 TABLET | Refills: 0 | Status: SHIPPED | OUTPATIENT
Start: 2024-09-12 | End: 2024-09-19

## 2024-10-15 ENCOUNTER — PATIENT MESSAGE (OUTPATIENT)
Dept: FAMILY MEDICINE CLINIC | Age: 69
End: 2024-10-15

## 2024-10-15 DIAGNOSIS — M25.511 ACUTE PAIN OF RIGHT SHOULDER: Primary | ICD-10-CM

## 2024-10-15 RX ORDER — TRAMADOL HYDROCHLORIDE 50 MG/1
50 TABLET ORAL EVERY 6 HOURS PRN
Qty: 28 TABLET | Refills: 0 | Status: SHIPPED | OUTPATIENT
Start: 2024-10-15 | End: 2024-10-22

## 2024-10-15 NOTE — TELEPHONE ENCOUNTER
Controlled Substance Monitoring:    Acute and Chronic Pain Monitoring:   RX Monitoring Periodic Controlled Substance Monitoring   10/15/2024  11:04 AM No signs of potential drug abuse or diversion identified.

## 2024-10-24 ENCOUNTER — OFFICE VISIT (OUTPATIENT)
Dept: FAMILY MEDICINE CLINIC | Age: 69
End: 2024-10-24

## 2024-10-24 VITALS
BODY MASS INDEX: 32.28 KG/M2 | HEIGHT: 63 IN | SYSTOLIC BLOOD PRESSURE: 126 MMHG | RESPIRATION RATE: 12 BRPM | DIASTOLIC BLOOD PRESSURE: 88 MMHG | HEART RATE: 98 BPM | TEMPERATURE: 97.1 F | OXYGEN SATURATION: 98 % | WEIGHT: 182.2 LBS

## 2024-10-24 DIAGNOSIS — Z23 NEEDS FLU SHOT: ICD-10-CM

## 2024-10-24 DIAGNOSIS — M43.6 ACUTE TORTICOLLIS: Primary | ICD-10-CM

## 2024-10-24 RX ORDER — CYCLOBENZAPRINE HCL 10 MG
10 TABLET ORAL 3 TIMES DAILY PRN
Qty: 30 TABLET | Refills: 0 | Status: SHIPPED | OUTPATIENT
Start: 2024-10-24 | End: 2024-11-03

## 2024-10-24 RX ORDER — CELECOXIB 100 MG/1
100 CAPSULE ORAL 2 TIMES DAILY
Qty: 60 CAPSULE | Refills: 0 | Status: SHIPPED | OUTPATIENT
Start: 2024-10-24

## 2024-10-24 RX ORDER — PREDNISONE 20 MG/1
40 TABLET ORAL DAILY
Qty: 10 TABLET | Refills: 0 | Status: SHIPPED | OUTPATIENT
Start: 2024-10-24 | End: 2024-10-29

## 2024-10-24 NOTE — PROGRESS NOTES
Vaccine Information Sheet, \"Influenza - Inactivated\"  given to Chetna Borges, or parent/legal guardian of  Chetna Borges and verbalized understanding.    Patient responses:    Have you ever had a reaction to a flu vaccine? No  Do you have an allergy to eggs, neomycin or polymixin?  No  Do you have an allergy to Thimerosal, contact lens solution, or Merthiolate? No  Have you ever had Guillian Speonk Syndrome?  No  Do you have any current illness?  No  Do you have a temperature above 100 degrees? No  Are you pregnant? No  If pregnant, permission obtained from physician? No  Do you have an active neurological disorder? No      Flu vaccine given per order. Please see immunization tab.

## 2024-10-24 NOTE — PROGRESS NOTES
[facility] at [facility]  [address] [place] [address]  Chief Complaint   Patient presents with    Neck Pain     Started Sunday woke up with neck pain, decreased ROM. Staying left side of neck. No known injury. Pain and stiffness has not improved much. Has tried muscle rubs, TENS unit, motrin with no relief.        History obtained from chart review and the patient.    SUBJECTIVE:  Chetna Borges is a 69 y.o. female that presents today for neck pain    Torticollis  Woke up during the middle of the night Sunday night with neck pain and stiffness  No specific injury  Has tried Tramadol, muscle rubs, motrin, TENS unit with minimal relief      Age/Gender Health Maintenance    Lipid -   Lab Results   Component Value Date    CHOL 186 02/14/2023    TRIG 109 02/14/2023    HDL 64 01/26/2024     DM Screen -   Hemoglobin A1C   Date Value Ref Range Status   10/11/2023 5.9 4.4 - 6.4 % Final     Colon Cancer Screening - needs  Lung Cancer Screening (Age 55 to 80 with 30 pack year hx, current smoker or quit within past 15 years) - n/a    Tetanus - next due 9/8/29  Influenza Vaccine - yearly  Pneumonia Vaccine - needs  Zostavax - complete   HPV Vaccine - n/a    Breast Cancer Screening - 12/28/23  Cervical Cancer Screening - n/a  Osteoporosis Screening - 12/28/23  Chlamydia Screen - n/a      Current Outpatient Medications   Medication Sig Dispense Refill    predniSONE (DELTASONE) 20 MG tablet Take 2 tablets by mouth daily for 5 days 10 tablet 0    celecoxib (CELEBREX) 100 MG capsule Take 1 capsule by mouth 2 times daily 60 capsule 0    cyclobenzaprine (FLEXERIL) 10 MG tablet Take 1 tablet by mouth 3 times daily as needed for Muscle spasms 30 tablet 0    dilTIAZem (DILT-XR) 120 MG extended release capsule Take 1 capsule by mouth one time daily to help control blood pressure. 90 capsule 2    escitalopram (LEXAPRO) 20 MG tablet Take 1 tablet by mouth daily 90 tablet 1    gabapentin (NEURONTIN) 800 MG tablet Take 1

## 2024-10-24 NOTE — PROGRESS NOTES
Immunization(s) given during visit:    Immunizations Administered       Name Date Dose Route    Influenza, FLUAD, (age 65 y+), IM, Trivalent PF, 0.5mL 10/24/2024 0.5 mL Intramuscular    Site: Deltoid- Right    Lot: [id number]    NDC: 11900-278-39            Most recent Vaccine Information Sheet dated 08/06/21 given to pt

## 2025-01-10 ENCOUNTER — OFFICE VISIT (OUTPATIENT)
Dept: CARDIOLOGY CLINIC | Age: 70
End: 2025-01-10

## 2025-01-10 ENCOUNTER — TELEPHONE (OUTPATIENT)
Dept: CARDIOLOGY CLINIC | Age: 70
End: 2025-01-10

## 2025-01-10 VITALS
HEIGHT: 63 IN | DIASTOLIC BLOOD PRESSURE: 102 MMHG | BODY MASS INDEX: 30.12 KG/M2 | SYSTOLIC BLOOD PRESSURE: 153 MMHG | HEART RATE: 82 BPM | WEIGHT: 170 LBS

## 2025-01-10 DIAGNOSIS — I25.10 CORONARY ARTERY DISEASE INVOLVING NATIVE CORONARY ARTERY OF NATIVE HEART WITHOUT ANGINA PECTORIS: Primary | ICD-10-CM

## 2025-01-10 DIAGNOSIS — R07.9 CHEST PAIN, UNSPECIFIED TYPE: ICD-10-CM

## 2025-01-10 DIAGNOSIS — R06.09 DOE (DYSPNEA ON EXERTION): ICD-10-CM

## 2025-01-10 RX ORDER — METOPROLOL SUCCINATE 25 MG/1
25 TABLET, EXTENDED RELEASE ORAL DAILY
Qty: 30 TABLET | Refills: 5 | Status: SHIPPED | OUTPATIENT
Start: 2025-01-10

## 2025-01-10 NOTE — TELEPHONE ENCOUNTER
Heart cath and echo scheduled 01-23-25, arrive 9:00am for echo then heart cath to follow    Left msg for pt to call office for date, time and instructions

## 2025-01-10 NOTE — PROGRESS NOTES
Pt has c/o extreme fatigue,.    EKG completed today.  
Absolute 0.1 0.0 - 0.4 thou/mm3    Basophils Absolute 0.1 0.0 - 0.1 thou/mm3    Immature Grans (Abs) 0.02 0.00 - 0.07 thou/mm3    nRBC 0 /100 wbc   Basic Metabolic Panel   Result Value Ref Range    Sodium 140 135 - 145 meq/L    Potassium 4.2 3.5 - 5.2 meq/L    Chloride 102 98 - 111 meq/L    CO2 31 23 - 33 meq/L    Glucose 107 70 - 108 mg/dL    BUN 6 (L) 7 - 22 mg/dL    Creatinine 0.5 0.4 - 1.2 mg/dL    Calcium 8.9 8.5 - 10.5 mg/dL   APTT   Result Value Ref Range    aPTT 35.0 22.0 - 38.0 seconds   Protime-INR   Result Value Ref Range    INR 0.83 (L) 0.85 - 1.13   Anion Gap   Result Value Ref Range    Anion Gap 7.0 (L) 8.0 - 16.0 meq/L   Glomerular Filtration Rate, Estimated   Result Value Ref Range    Est, Glom Filt Rate > 90 >60 ml/min/1.73m2       I have individually reviewed the below cardiac tests below:    Stress Test:6/2022  Imaging Results:Calculated gated LVEF 65 %.  The T.I.D. ratio was 1.2 .  There is mild attenuation artifact noted in the anterior wall seems to be  related to breast artifact.  The nuclear images is not suggestive for myocardial ischemia.      Conclusions      Summary   The nuclear images is not suggestive for myocardial ischemia.      Recommendation   Clinical correlation is recommended.      Signatures      ----------------------------------------------------------------   Electronically signed by Nereyda Escamilla MD (Interpreting   Cardiologist) on 06/07/2022 at 15:31    EKG Sinus Rhythm   -Left atrial enlargement.  -Negative precordial T-waves -Probably normal -consider anteroseptal ischemia.    BORDERLINE    Assessment/Plan   Chest pain  Angina  Dyspnea on exertion  Fatigue    Coronary artery disease   S/p angioplasty 1997  Dyslipidemia   Hypertension   PACs  Aortic regurgitation   BETSY    Prior h/oi CAD, angioplasty   In 2017 [facility] revealed nonobstructive CAD  Stress test 06/2022 was negative for ischemia  Echo 6/2022 revealed preserved EF, grade I DD, Mild AI  Continue risk factor

## 2025-01-15 PROBLEM — R06.02 SOB (SHORTNESS OF BREATH) ON EXERTION: Status: ACTIVE | Noted: 2025-01-10

## 2025-01-20 DIAGNOSIS — M43.6 ACUTE TORTICOLLIS: ICD-10-CM

## 2025-01-20 DIAGNOSIS — E53.8 VITAMIN B12 DEFICIENCY: ICD-10-CM

## 2025-01-20 DIAGNOSIS — R51.9 ACUTE NONINTRACTABLE HEADACHE, UNSPECIFIED HEADACHE TYPE: ICD-10-CM

## 2025-01-20 DIAGNOSIS — M54.16 LUMBAR RADICULOPATHY: ICD-10-CM

## 2025-01-20 DIAGNOSIS — F33.41 RECURRENT MAJOR DEPRESSIVE DISORDER, IN PARTIAL REMISSION (HCC): ICD-10-CM

## 2025-01-20 RX ORDER — CYANOCOBALAMIN 1000 UG/ML
INJECTION, SOLUTION INTRAMUSCULAR; SUBCUTANEOUS
Qty: 2 ML | Refills: 2 | Status: CANCELLED | OUTPATIENT
Start: 2025-01-20

## 2025-01-20 RX ORDER — SYRINGE W-NEEDLE,DISPOSAB,3 ML 25GX5/8"
1 SYRINGE, EMPTY DISPOSABLE MISCELLANEOUS
Qty: 2 EACH | Refills: 2 | Status: CANCELLED | OUTPATIENT
Start: 2025-01-20

## 2025-01-20 RX ORDER — OMEPRAZOLE 40 MG/1
40 CAPSULE, DELAYED RELEASE ORAL DAILY
Qty: 90 CAPSULE | Refills: 1 | Status: CANCELLED | OUTPATIENT
Start: 2025-01-20

## 2025-01-20 RX ORDER — CETIRIZINE HYDROCHLORIDE 10 MG/1
TABLET ORAL
Qty: 90 TABLET | Refills: 1 | Status: SHIPPED | OUTPATIENT
Start: 2025-01-20

## 2025-01-20 RX ORDER — BUTALBITAL, ACETAMINOPHEN AND CAFFEINE 50; 325; 40 MG/1; MG/1; MG/1
1 TABLET ORAL EVERY 4 HOURS PRN
Qty: 20 TABLET | Refills: 0 | Status: SHIPPED | OUTPATIENT
Start: 2025-01-20

## 2025-01-20 RX ORDER — ESCITALOPRAM OXALATE 20 MG/1
20 TABLET ORAL DAILY
Qty: 90 TABLET | Refills: 1 | Status: SHIPPED | OUTPATIENT
Start: 2025-01-20

## 2025-01-20 RX ORDER — CYANOCOBALAMIN 1000 UG/ML
1000 INJECTION, SOLUTION INTRAMUSCULAR; SUBCUTANEOUS
Qty: 10 ML | Refills: 0 | Status: SHIPPED | OUTPATIENT
Start: 2025-01-20

## 2025-01-20 RX ORDER — SYRINGE W-NEEDLE,DISPOSAB,3 ML 25GX5/8"
1 SYRINGE, EMPTY DISPOSABLE MISCELLANEOUS
Qty: 50 EACH | Refills: 1 | Status: SHIPPED | OUTPATIENT
Start: 2025-01-20

## 2025-01-20 RX ORDER — CELECOXIB 100 MG/1
100 CAPSULE ORAL 2 TIMES DAILY
Qty: 180 CAPSULE | Refills: 1 | Status: SHIPPED | OUTPATIENT
Start: 2025-01-20

## 2025-01-20 RX ORDER — CETIRIZINE HYDROCHLORIDE 10 MG/1
TABLET ORAL
Qty: 90 TABLET | Refills: 1 | Status: CANCELLED | OUTPATIENT
Start: 2025-01-20

## 2025-01-20 RX ORDER — OMEPRAZOLE 40 MG/1
40 CAPSULE, DELAYED RELEASE ORAL DAILY
Qty: 90 CAPSULE | Refills: 1 | Status: SHIPPED | OUTPATIENT
Start: 2025-01-20

## 2025-01-20 RX ORDER — GABAPENTIN 800 MG/1
800 TABLET ORAL 3 TIMES DAILY
Qty: 270 TABLET | Refills: 1 | Status: SHIPPED | OUTPATIENT
Start: 2025-01-20 | End: 2025-07-19

## 2025-01-20 NOTE — TELEPHONE ENCOUNTER
Controlled Substance Monitoring:    Acute and Chronic Pain Monitoring:   RX Monitoring Periodic Controlled Substance Monitoring   1/20/2025   1:49 PM No signs of potential drug abuse or diversion identified.

## 2025-01-20 NOTE — TELEPHONE ENCOUNTER
Recent Visits  Date Type Provider Dept   10/24/24 Office Visit Rogelio Martinez APRN - CNP [facility]   06/20/24 Office Visit Rogelio Martinez, APRN - CNP [facility]   05/13/24 Office Visit Rogelio Martinez APRN - CNP [facility]   04/01/24 Office Visit Rogelio Martinez APRN - CNP [facility]   02/19/24 Office Visit Rogelio Martinez APRN - CNP [facility]   01/22/24 Office Visit Rogelio Martinez APRN - CNP [facility]   09/27/23 Office Visit [facility]   Showing recent visits within past 540 days with a meds authorizing provider and meeting all other requirements  Future Appointments  No visits were found meeting these conditions.  Showing future appointments within next 150 days with a meds authorizing provider and meeting all other requirements

## 2025-01-22 ENCOUNTER — PREP FOR PROCEDURE (OUTPATIENT)
Dept: CARDIOLOGY | Age: 70
End: 2025-01-22

## 2025-01-22 RX ORDER — SODIUM CHLORIDE 0.9 % (FLUSH) 0.9 %
5-40 SYRINGE (ML) INJECTION EVERY 12 HOURS SCHEDULED
Status: CANCELLED | OUTPATIENT
Start: 2025-01-22

## 2025-01-22 RX ORDER — SODIUM CHLORIDE 9 MG/ML
INJECTION, SOLUTION INTRAVENOUS PRN
Status: CANCELLED | OUTPATIENT
Start: 2025-01-22

## 2025-01-22 RX ORDER — SODIUM CHLORIDE 9 MG/ML
INJECTION, SOLUTION INTRAVENOUS CONTINUOUS
Status: CANCELLED | OUTPATIENT
Start: 2025-01-22

## 2025-01-22 RX ORDER — SODIUM CHLORIDE 0.9 % (FLUSH) 0.9 %
5-40 SYRINGE (ML) INJECTION PRN
Status: CANCELLED | OUTPATIENT
Start: 2025-01-22

## 2025-01-22 RX ORDER — NITROGLYCERIN 0.4 MG/1
0.4 TABLET SUBLINGUAL EVERY 5 MIN PRN
Status: CANCELLED | OUTPATIENT
Start: 2025-01-22

## 2025-01-22 RX ORDER — ASPIRIN 325 MG
325 TABLET ORAL ONCE
Status: CANCELLED | OUTPATIENT
Start: 2025-01-22 | End: 2025-01-22

## 2025-01-22 NOTE — PROGRESS NOTES
..  Procedure is scheduled for 1/22/2025, admission time is 0900   Please arrive 15 minutes early  You [facility]  NPO after midnight  Bring drivers license and insurance information  Wear comfortable clean clothes  Shower morning of and night before with liquid antibacterial soap  Remove jewelry   May have to stay overnight if have PTCA/stent - bring an overnight bag.  Leave valuables at home such as cash or credit cards  Bring medications in original bottles - do not take diabetic meds days of procedure.  It is OK to take BP and heart meds.  If you take ASA, plavix, effient or brilinta - please take AM of procedure  If your are on anticoagulants such as coumadin/warfarin, eliquis, xarelto or pradaxa - hold as directed by your Dr  Made aware of visitors limit to 2 at a time  Follow all instructions given by your physician  Please notify doctor office if you need to cancel or reschedule your procedure   needed at discharge

## 2025-01-23 ENCOUNTER — HOSPITAL ENCOUNTER (OUTPATIENT)
Age: 70
Discharge: HOME | End: 2025-01-25
Attending: INTERNAL MEDICINE
Payer: MEDICARE

## 2025-01-23 ENCOUNTER — HOSPITAL ENCOUNTER (OUTPATIENT)
Age: 70
Setting detail: OUTPATIENT SURGERY
Discharge: HOME | End: 2025-01-23
Attending: INTERNAL MEDICINE | Admitting: INTERNAL MEDICINE
Payer: MEDICARE

## 2025-01-23 VITALS
WEIGHT: 178.35 LBS | OXYGEN SATURATION: 98 % | TEMPERATURE: 97.9 F | RESPIRATION RATE: 11 BRPM | DIASTOLIC BLOOD PRESSURE: 89 MMHG | SYSTOLIC BLOOD PRESSURE: 122 MMHG | HEART RATE: 59 BPM | HEIGHT: 63 IN | BODY MASS INDEX: 31.6 KG/M2

## 2025-01-23 DIAGNOSIS — R07.9 CHEST PAIN, UNSPECIFIED TYPE: ICD-10-CM

## 2025-01-23 DIAGNOSIS — R06.02 SOB (SHORTNESS OF BREATH) ON EXERTION: ICD-10-CM

## 2025-01-23 LAB
ABO GROUP BLD: NORMAL
ANION GAP SERPL CALC-SCNC: 11 MEQ/L (ref 8–16)
BUN SERPL-MCNC: 7 MG/DL (ref 7–22)
CALCIUM SERPL-MCNC: 8.3 MG/DL (ref 8.5–10.5)
CHLORIDE SERPL-SCNC: 103 MEQ/L (ref 98–111)
CHOLEST SERPL-MCNC: 227 MG/DL (ref 100–199)
CO2 SERPL-SCNC: 25 MEQ/L (ref 23–33)
CREAT SERPL-MCNC: 0.6 MG/DL (ref 0.4–1.2)
DEPRECATED MEAN GLUCOSE BLD GHB EST-ACNC: 99 MG/DL (ref 70–126)
DEPRECATED RDW RBC AUTO: 51.1 FL (ref 35–45)
ECHO AO ASC DIAM: 3.4 CM
ECHO AO SINUS VALSALVA DIAM: 3.4 CM
ECHO AO ST JNCT DIAM: 2.7 CM
ECHO AR MAX VEL PISA: 4.3 M/S
ECHO AV AREA PEAK VELOCITY: 1.9 CM2
ECHO AV AREA VTI: 2 CM2
ECHO AV CUSP MM: 1.4 CM
ECHO AV MEAN GRADIENT: 10 MMHG
ECHO AV MEAN VELOCITY: 1.4 M/S
ECHO AV PEAK GRADIENT: 17 MMHG
ECHO AV PEAK VELOCITY: 2.1 M/S
ECHO AV REGURGITANT PHT: 714 MS
ECHO AV VELOCITY RATIO: 0.57
ECHO AV VTI: 50.5 CM
ECHO BSA: 1.9 M2
ECHO EST RA PRESSURE: 3 MMHG
ECHO LA AREA 2C: 17.6 CM2
ECHO LA AREA 4C: 17.4 CM2
ECHO LA DIAMETER: 3.9 CM
ECHO LA MAJOR AXIS: 6 CM
ECHO LA MINOR AXIS: 5.6 CM
ECHO LA VOL BP: 45 ML (ref 22–52)
ECHO LA VOL MOD A2C: 47 ML (ref 22–52)
ECHO LA VOL MOD A4C: 41 ML (ref 22–52)
ECHO LV E' LATERAL VELOCITY: 6.3 CM/S
ECHO LV E' SEPTAL VELOCITY: 4.9 CM/S
ECHO LV EJECTION FRACTION BIPLANE: 55 % (ref 55–100)
ECHO LV FRACTIONAL SHORTENING: 32 % (ref 28–44)
ECHO LV INTERNAL DIMENSION DIASTOLIC: 4.4 CM (ref 3.9–5.3)
ECHO LV INTERNAL DIMENSION SYSTOLIC: 3 CM
ECHO LV ISOVOLUMETRIC RELAXATION TIME (IVRT): 88 MS
ECHO LV IVSD: 1.1 CM (ref 0.6–0.9)
ECHO LV MASS 2D: 168.9 G (ref 67–162)
ECHO LV POSTERIOR WALL DIASTOLIC: 1.1 CM (ref 0.6–0.9)
ECHO LV RELATIVE WALL THICKNESS RATIO: 0.5
ECHO LVOT AREA: 3.5 CM2
ECHO LVOT AV VTI INDEX: 0.56
ECHO LVOT DIAM: 2.1 CM
ECHO LVOT MEAN GRADIENT: 3 MMHG
ECHO LVOT PEAK GRADIENT: 5 MMHG
ECHO LVOT PEAK VELOCITY: 1.2 M/S
ECHO LVOT SV: 98.7 ML
ECHO LVOT VTI: 28.5 CM
ECHO MV A VELOCITY: 1.02 M/S
ECHO MV E DECELERATION TIME (DT): 219 MS
ECHO MV E VELOCITY: 0.77 M/S
ECHO MV E/A RATIO: 0.75
ECHO MV E/E' LATERAL: 12.22
ECHO MV E/E' RATIO (AVERAGED): 13.97
ECHO MV E/E' SEPTAL: 15.71
ECHO PV MAX VELOCITY: 0.8 M/S
ECHO PV PEAK GRADIENT: 3 MMHG
ECHO RIGHT VENTRICULAR SYSTOLIC PRESSURE (RVSP): 17 MMHG
ECHO RV FREE WALL PEAK S': 12 CM/S
ECHO RV INTERNAL DIMENSION: 3.2 CM
ECHO RV TAPSE: 2 CM (ref 1.7–?)
ECHO TV E WAVE: 0.5 M/S
ECHO TV REGURGITANT MAX VELOCITY: 1.88 M/S
ECHO TV REGURGITANT PEAK GRADIENT: 14 MMHG
EKG ATRIAL RATE: 58 BPM
EKG P AXIS: 52 DEGREES
EKG P-R INTERVAL: 184 MS
EKG Q-T INTERVAL: 470 MS
EKG QRS DURATION: 82 MS
EKG QTC CALCULATION (BAZETT): 461 MS
EKG R AXIS: 59 DEGREES
EKG T AXIS: 64 DEGREES
EKG VENTRICULAR RATE: 58 BPM
ERYTHROCYTE [DISTWIDTH] IN BLOOD BY AUTOMATED COUNT: 14.2 % (ref 11.5–14.5)
GFR SERPL CREATININE-BSD FRML MDRD: > 90 ML/MIN/1.73M2
GLUCOSE SERPL-MCNC: 79 MG/DL (ref 70–108)
HBA1C MFR BLD HPLC: 5.3 % (ref 4.4–6.4)
HCT VFR BLD AUTO: 44.7 % (ref 37–47)
HDLC SERPL-MCNC: 67 MG/DL
HGB BLD-MCNC: 14.3 GM/DL (ref 12–16)
IAT IGG-SP REAG SERPL QL: NORMAL
INR PPP: 0.8 (ref 0.85–1.13)
LDLC SERPL CALC-MCNC: 146 MG/DL
MCH RBC QN AUTO: 31 PG (ref 26–33)
MCHC RBC AUTO-ENTMCNC: 32 GM/DL (ref 32.2–35.5)
MCV RBC AUTO: 97 FL (ref 81–99)
PLATELET # BLD AUTO: 292 THOU/MM3 (ref 130–400)
PMV BLD AUTO: 9.8 FL (ref 9.4–12.4)
POTASSIUM SERPL-SCNC: 3.9 MEQ/L (ref 3.5–5.2)
RBC # BLD AUTO: 4.61 MILL/MM3 (ref 4.2–5.4)
RH BLD: NORMAL
SODIUM SERPL-SCNC: 139 MEQ/L (ref 135–145)
TRIGL SERPL-MCNC: 70 MG/DL (ref 0–199)
WBC # BLD AUTO: 6 THOU/MM3 (ref 4.8–10.8)

## 2025-01-23 PROCEDURE — 93005 ELECTROCARDIOGRAM TRACING: CPT | Performed by: NURSE PRACTITIONER

## 2025-01-23 PROCEDURE — 6360000002 HC RX W HCPCS: Performed by: INTERNAL MEDICINE

## 2025-01-23 PROCEDURE — 2709999900 HC NON-CHARGEABLE SUPPLY: Performed by: INTERNAL MEDICINE

## 2025-01-23 PROCEDURE — 99152 MOD SED SAME PHYS/QHP 5/>YRS: CPT | Performed by: INTERNAL MEDICINE

## 2025-01-23 PROCEDURE — 85027 COMPLETE CBC AUTOMATED: CPT

## 2025-01-23 PROCEDURE — 86885 COOMBS TEST INDIRECT QUAL: CPT

## 2025-01-23 PROCEDURE — 86900 BLOOD TYPING SEROLOGIC ABO: CPT

## 2025-01-23 PROCEDURE — 93010 ELECTROCARDIOGRAM REPORT: CPT | Performed by: INTERNAL MEDICINE

## 2025-01-23 PROCEDURE — 80048 BASIC METABOLIC PNL TOTAL CA: CPT

## 2025-01-23 PROCEDURE — 2500000003 HC RX 250 WO HCPCS: Performed by: INTERNAL MEDICINE

## 2025-01-23 PROCEDURE — 86901 BLOOD TYPING SEROLOGIC RH(D): CPT

## 2025-01-23 PROCEDURE — 80061 LIPID PANEL: CPT

## 2025-01-23 PROCEDURE — 93458 L HRT ARTERY/VENTRICLE ANGIO: CPT | Performed by: INTERNAL MEDICINE

## 2025-01-23 PROCEDURE — 2580000003 HC RX 258: Performed by: NURSE PRACTITIONER

## 2025-01-23 PROCEDURE — 7100000010 HC PHASE II RECOVERY - FIRST 15 MIN: Performed by: INTERNAL MEDICINE

## 2025-01-23 PROCEDURE — 93306 TTE W/DOPPLER COMPLETE: CPT

## 2025-01-23 PROCEDURE — C1894 INTRO/SHEATH, NON-LASER: HCPCS | Performed by: INTERNAL MEDICINE

## 2025-01-23 PROCEDURE — 83036 HEMOGLOBIN GLYCOSYLATED A1C: CPT

## 2025-01-23 PROCEDURE — 36415 COLL VENOUS BLD VENIPUNCTURE: CPT

## 2025-01-23 PROCEDURE — 7100000011 HC PHASE II RECOVERY - ADDTL 15 MIN: Performed by: INTERNAL MEDICINE

## 2025-01-23 PROCEDURE — 85610 PROTHROMBIN TIME: CPT

## 2025-01-23 PROCEDURE — 6360000004 HC RX CONTRAST MEDICATION: Performed by: INTERNAL MEDICINE

## 2025-01-23 PROCEDURE — C1769 GUIDE WIRE: HCPCS | Performed by: INTERNAL MEDICINE

## 2025-01-23 RX ORDER — ACETAMINOPHEN 325 MG/1
650 TABLET ORAL EVERY 4 HOURS PRN
Status: DISCONTINUED | OUTPATIENT
Start: 2025-01-23 | End: 2025-01-23 | Stop reason: HOSPADM

## 2025-01-23 RX ORDER — SODIUM CHLORIDE 0.9 % (FLUSH) 0.9 %
5-40 SYRINGE (ML) INJECTION EVERY 12 HOURS SCHEDULED
Status: DISCONTINUED | OUTPATIENT
Start: 2025-01-23 | End: 2025-01-23 | Stop reason: HOSPADM

## 2025-01-23 RX ORDER — SODIUM CHLORIDE 0.9 % (FLUSH) 0.9 %
5-40 SYRINGE (ML) INJECTION PRN
Status: DISCONTINUED | OUTPATIENT
Start: 2025-01-23 | End: 2025-01-23 | Stop reason: HOSPADM

## 2025-01-23 RX ORDER — SODIUM CHLORIDE 9 MG/ML
INJECTION, SOLUTION INTRAVENOUS CONTINUOUS
Status: DISCONTINUED | OUTPATIENT
Start: 2025-01-23 | End: 2025-01-23 | Stop reason: HOSPADM

## 2025-01-23 RX ORDER — ATROPINE SULFATE 0.4 MG/ML
0.5 INJECTION, SOLUTION INTRAVENOUS
Status: DISCONTINUED | OUTPATIENT
Start: 2025-01-23 | End: 2025-01-23 | Stop reason: HOSPADM

## 2025-01-23 RX ORDER — NITROGLYCERIN 0.4 MG/1
0.4 TABLET SUBLINGUAL EVERY 5 MIN PRN
Status: DISCONTINUED | OUTPATIENT
Start: 2025-01-23 | End: 2025-01-23 | Stop reason: HOSPADM

## 2025-01-23 RX ORDER — MIDAZOLAM HYDROCHLORIDE 1 MG/ML
INJECTION, SOLUTION INTRAMUSCULAR; INTRAVENOUS PRN
Status: DISCONTINUED | OUTPATIENT
Start: 2025-01-23 | End: 2025-01-23 | Stop reason: HOSPADM

## 2025-01-23 RX ORDER — ASPIRIN 325 MG
325 TABLET ORAL ONCE
Status: DISCONTINUED | OUTPATIENT
Start: 2025-01-23 | End: 2025-01-23 | Stop reason: HOSPADM

## 2025-01-23 RX ORDER — FENTANYL CITRATE 50 UG/ML
INJECTION, SOLUTION INTRAMUSCULAR; INTRAVENOUS PRN
Status: DISCONTINUED | OUTPATIENT
Start: 2025-01-23 | End: 2025-01-23 | Stop reason: HOSPADM

## 2025-01-23 RX ORDER — IOPAMIDOL 755 MG/ML
INJECTION, SOLUTION INTRAVASCULAR PRN
Status: DISCONTINUED | OUTPATIENT
Start: 2025-01-23 | End: 2025-01-23 | Stop reason: HOSPADM

## 2025-01-23 RX ORDER — SODIUM CHLORIDE 9 MG/ML
INJECTION, SOLUTION INTRAVENOUS PRN
Status: DISCONTINUED | OUTPATIENT
Start: 2025-01-23 | End: 2025-01-23 | Stop reason: HOSPADM

## 2025-01-23 RX ORDER — LIDOCAINE HYDROCHLORIDE 20 MG/ML
INJECTION, SOLUTION EPIDURAL; INFILTRATION; INTRACAUDAL; PERINEURAL PRN
Status: DISCONTINUED | OUTPATIENT
Start: 2025-01-23 | End: 2025-01-23 | Stop reason: HOSPADM

## 2025-01-23 RX ORDER — SODIUM CHLORIDE 9 MG/ML
INJECTION, SOLUTION INTRAVENOUS CONTINUOUS
Status: DISCONTINUED | OUTPATIENT
Start: 2025-01-23 | End: 2025-01-23

## 2025-01-23 RX ADMIN — SODIUM CHLORIDE: 9 INJECTION, SOLUTION INTRAVENOUS at 10:49

## 2025-01-23 NOTE — H&P
[facility]  Sedation/Analgesia History & Physical    Pt Name: Chetna Borges  Account number: [id number]  MRN: [id number]  YOB: 1955  Provider Performing Procedure: Francine Dawn MD MD  Referring Provider: Francine Dawn MD   Primary Care Physician: Rogelio Martinez APRN - CNP  Date: 1/23/2025    PRE-PROCEDURE    Code Status: FULL CODE  Brief History/Pre-Procedure Diagnosis:   Coronary artery disease   Angina   CAD, s/p PCI  Consent: : I have discussed with the patient risks, benefits, and alternatives (and relevant risks, benefits, and side effects related to alternatives or not receiving care), and likelihood of the success.   The patient and/or representative appear to understand and agree to proceed.  The discussion encompasses risks, benefits, and side effects related to the alternatives and the risks related to not receiving the proposed care, treatment, and services.     The indication, risks and benefits of the procedure and possible therapeutic consequences and alternatives were discussed with the patient. The patient was given the opportunity to ask questions and to have them answered to his/her satisfaction. Risks of the procedure include but are not limited to the following: Bleeding, hematoma including retroperitoneal hemmorhage, infection, pain and discomfort, injury to the aorta and other blood vessels, rhythm disturbance, low blood pressure, myocardial infarction, stroke, kidney damage/failure, myocardial perforation, allergic reactions to sedatives/contrast material, loss of pulse/vascular compromise requiring surgery, aneurysm/pseudoaneurysm formation, possible loss of a limb/hand/leg, needing blood transfusion, requiring emergent open heart surgery or emergent coronary intervention, the need for intubation/respiratory support, the requirement for defibrillation/cardioversion, and death. Alternatives to and omission of the suggested procedure were discussed. The  - CNP    nitroGLYCERIN (NITROSTAT) SL tablet 0.4 mg, 0.4 mg, SubLINGual, Q5 Min PRN, Kailey Adan APRN - CNP    0.9 % sodium chloride infusion, , IntraVENous, Continuous, Kailey Adan APRN - CNP, Last Rate: 50 mL/hr at 01/23/25 1049, New Bag at 01/23/25 1049  Prior to Admission medications    Medication Sig Start Date End Date Taking? Authorizing Provider   escitalopram (LEXAPRO) 20 MG tablet Take 1 tablet by mouth daily 1/20/25  Yes Rogelio Martinez APRN - CNP   gabapentin (NEURONTIN) 800 MG tablet Take 1 tablet by mouth 3 times daily for 180 days. 1/20/25 7/19/25 Yes Rogelio Martinez APRN - CNP   butalbital-acetaminophen-caffeine (FIORICET, ESGIC) -40 MG per tablet Take 1 tablet by mouth every 4 hours as needed for Headaches 1/20/25  Yes Rogelio Martinez APRN - CNP   celecoxib (CELEBREX) 100 MG capsule Take 1 capsule by mouth 2 times daily 1/20/25  Yes Rogelio Martinez APRN - CNP   cetirizine (ZYRTEC) 10 MG tablet TAKE ONE TABLET EACH DAY to help control sinus and allergies **DRINK PLENTY OF WATER** 1/20/25  Yes Rogelio Martinez APRN - CNP   omeprazole (PRILOSEC) 40 MG delayed release capsule Take 1 capsule by mouth daily 1 tab x 2 daily 1/20/25  Yes Rogelio Martinez APRN - CNP   Syringe/Needle, Disp, (SYRINGE 3CC/25GX1\") 25G X 1\" 3 ML MISC 1 mL by Does not apply route every 30 days 1/20/25  Yes Rogelio Martinez APRN - CNP   cyanocobalamin 1000 MCG/ML injection Inject 1 mL into the muscle every 30 days 1/20/25  Yes Rogelio Martinez APRN - CNP   metoprolol succinate (TOPROL XL) 25 MG extended release tablet Take 1 tablet by mouth daily  Patient taking differently: Take 1 tablet by mouth at bedtime 1/10/25  Yes Francine Dawn MD   dilTIAZem (DILT-XR) 120 MG extended release capsule Take 1 capsule by mouth one time daily to help control blood pressure. 6/17/24  Yes Francine Dawn MD   amitriptyline (ELAVIL) 10 MG tablet Take 1 tablet by mouth nightly 2/19/24  Yes Rogelio Martinez, APRN -

## 2025-01-23 NOTE — PROGRESS NOTES
Patient admitted to 2E01  Ambulatory for cardiac cath.  Patient NPO. Patient accompanied by family.  Vital signs obtained.   Assessment and data collection intiated.   Oriented to room.  Policies and procedures for 2E explained.   All questions answered with no further questions at this time.   Fall prevention and safety precautions discussed with patient.

## 2025-01-23 NOTE — PROGRESS NOTES
Returned to 2E01.  Monitor attached showing SR. Vacs-band in place to right wrist.  Hemostasis maintained, no bleeding, swelling, or edema noted.  0.9NSS infusing with approx 700 ml remaining

## 2025-02-05 ENCOUNTER — LAB (OUTPATIENT)
Dept: LAB | Age: 70
End: 2025-02-05

## 2025-02-05 ENCOUNTER — OFFICE VISIT (OUTPATIENT)
Dept: FAMILY MEDICINE CLINIC | Age: 70
End: 2025-02-05

## 2025-02-05 VITALS
HEIGHT: 63 IN | OXYGEN SATURATION: 98 % | RESPIRATION RATE: 12 BRPM | DIASTOLIC BLOOD PRESSURE: 78 MMHG | TEMPERATURE: 96.8 F | BODY MASS INDEX: 30.3 KG/M2 | WEIGHT: 171 LBS | HEART RATE: 69 BPM | SYSTOLIC BLOOD PRESSURE: 122 MMHG

## 2025-02-05 DIAGNOSIS — F33.1 MODERATE EPISODE OF RECURRENT MAJOR DEPRESSIVE DISORDER (HCC): ICD-10-CM

## 2025-02-05 DIAGNOSIS — M79.7 FIBROMYALGIA: ICD-10-CM

## 2025-02-05 DIAGNOSIS — J01.90 ACUTE RHINOSINUSITIS: ICD-10-CM

## 2025-02-05 DIAGNOSIS — R52 WHOLE BODY PAIN: ICD-10-CM

## 2025-02-05 DIAGNOSIS — R52 WHOLE BODY PAIN: Primary | ICD-10-CM

## 2025-02-05 LAB
CK SERPL-CCNC: 62 U/L (ref 30–135)
CRP SERPL-MCNC: 0.47 MG/DL (ref 0–1)
ERYTHROCYTE [SEDIMENTATION RATE] IN BLOOD BY WESTERGREN METHOD: 6 MM/HR (ref 0–20)
RHEUMATOID FACT SERPL-ACNC: < 10 IU/ML (ref 0–13)
URATE SERPL-MCNC: 4.4 MG/DL (ref 2.4–5.7)

## 2025-02-05 RX ORDER — DOXYCYCLINE HYCLATE 100 MG
100 TABLET ORAL 2 TIMES DAILY
Qty: 20 TABLET | Refills: 0 | Status: SHIPPED | OUTPATIENT
Start: 2025-02-05 | End: 2025-02-15

## 2025-02-05 RX ORDER — DULOXETIN HYDROCHLORIDE 30 MG/1
30 CAPSULE, DELAYED RELEASE ORAL DAILY
Qty: 30 CAPSULE | Refills: 5 | Status: SHIPPED | OUTPATIENT
Start: 2025-02-05

## 2025-02-05 RX ORDER — FLUTICASONE PROPIONATE 50 MCG
2 SPRAY, SUSPENSION (ML) NASAL DAILY
Qty: 1 EACH | Refills: 1 | Status: SHIPPED | OUTPATIENT
Start: 2025-02-05

## 2025-02-05 SDOH — ECONOMIC STABILITY: FOOD INSECURITY: WITHIN THE PAST 12 MONTHS, YOU WORRIED THAT YOUR FOOD WOULD RUN OUT BEFORE YOU GOT MONEY TO BUY MORE.: NEVER TRUE

## 2025-02-05 SDOH — ECONOMIC STABILITY: TRANSPORTATION INSECURITY
IN THE PAST 12 MONTHS, HAS LACK OF TRANSPORTATION KEPT YOU FROM MEETINGS, WORK, OR FROM GETTING THINGS NEEDED FOR DAILY LIVING?: NO

## 2025-02-05 SDOH — ECONOMIC STABILITY: FOOD INSECURITY: WITHIN THE PAST 12 MONTHS, THE FOOD YOU BOUGHT JUST DIDN'T LAST AND YOU DIDN'T HAVE MONEY TO GET MORE.: NEVER TRUE

## 2025-02-05 SDOH — ECONOMIC STABILITY: INCOME INSECURITY: IN THE LAST 12 MONTHS, WAS THERE A TIME WHEN YOU WERE NOT ABLE TO PAY THE MORTGAGE OR RENT ON TIME?: NO

## 2025-02-05 SDOH — ECONOMIC STABILITY: TRANSPORTATION INSECURITY
IN THE PAST 12 MONTHS, HAS THE LACK OF TRANSPORTATION KEPT YOU FROM MEDICAL APPOINTMENTS OR FROM GETTING MEDICATIONS?: NO

## 2025-02-05 ASSESSMENT — PATIENT HEALTH QUESTIONNAIRE - PHQ9
9. THOUGHTS THAT YOU WOULD BE BETTER OFF DEAD, OR OF HURTING YOURSELF: NOT AT ALL
1. LITTLE INTEREST OR PLEASURE IN DOING THINGS: NEARLY EVERY DAY
6. FEELING BAD ABOUT YOURSELF - OR THAT YOU ARE A FAILURE OR HAVE LET YOURSELF OR YOUR FAMILY DOWN: SEVERAL DAYS
2. FEELING DOWN, DEPRESSED OR HOPELESS: SEVERAL DAYS
5. POOR APPETITE OR OVEREATING: NEARLY EVERY DAY
7. TROUBLE CONCENTRATING ON THINGS, SUCH AS READING THE NEWSPAPER OR WATCHING TELEVISION: SEVERAL DAYS
8. MOVING OR SPEAKING SO SLOWLY THAT OTHER PEOPLE COULD HAVE NOTICED. OR THE OPPOSITE - BEING SO FIDGETY OR RESTLESS THAT YOU HAVE BEEN MOVING AROUND A LOT MORE THAN USUAL: NOT AT ALL
4. FEELING TIRED OR HAVING LITTLE ENERGY: NEARLY EVERY DAY
10. IF YOU CHECKED OFF ANY PROBLEMS, HOW DIFFICULT HAVE THESE PROBLEMS MADE IT FOR YOU TO DO YOUR WORK, TAKE CARE OF THINGS AT HOME, OR GET ALONG WITH OTHER PEOPLE: EXTREMELY DIFFICULT
1. LITTLE INTEREST OR PLEASURE IN DOING THINGS: NEARLY EVERY DAY
SUM OF ALL RESPONSES TO PHQ QUESTIONS 1-9: 13
8. MOVING OR SPEAKING SO SLOWLY THAT OTHER PEOPLE COULD HAVE NOTICED. OR THE OPPOSITE, BEING SO FIGETY OR RESTLESS THAT YOU HAVE BEEN MOVING AROUND A LOT MORE THAN USUAL: NOT AT ALL
SUM OF ALL RESPONSES TO PHQ QUESTIONS 1-9: 13
7. TROUBLE CONCENTRATING ON THINGS, SUCH AS READING THE NEWSPAPER OR WATCHING TELEVISION: SEVERAL DAYS
3. TROUBLE FALLING OR STAYING ASLEEP: SEVERAL DAYS
2. FEELING DOWN, DEPRESSED OR HOPELESS: SEVERAL DAYS
SUM OF ALL RESPONSES TO PHQ QUESTIONS 1-9: 13
9. THOUGHTS THAT YOU WOULD BE BETTER OFF DEAD, OR OF HURTING YOURSELF: NOT AT ALL
3. TROUBLE FALLING OR STAYING ASLEEP: SEVERAL DAYS
5. POOR APPETITE OR OVEREATING: NEARLY EVERY DAY
6. FEELING BAD ABOUT YOURSELF - OR THAT YOU ARE A FAILURE OR HAVE LET YOURSELF OR YOUR FAMILY DOWN: SEVERAL DAYS
SUM OF ALL RESPONSES TO PHQ9 QUESTIONS 1 & 2: 4
10. IF YOU CHECKED OFF ANY PROBLEMS, HOW DIFFICULT HAVE THESE PROBLEMS MADE IT FOR YOU TO DO YOUR WORK, TAKE CARE OF THINGS AT HOME, OR GET ALONG WITH OTHER PEOPLE: EXTREMELY DIFFICULT
4. FEELING TIRED OR HAVING LITTLE ENERGY: NEARLY EVERY DAY

## 2025-02-05 NOTE — PATIENT INSTRUCTIONS
Take 10 mg lexapro daily for 1-2 weeks  Then take 10 mg lexapro every other day for 1-2 weeks  Continue taper off

## 2025-02-05 NOTE — PROGRESS NOTES
[facility] Medicine at [facility]  [address] [place] [address]  Chief Complaint   Patient presents with    Pain     Started before Thanksgiving. Pain all over. Pain in muscles, joints, and bones. Got cold like symptoms after Thanksgiving that last for 5 weeks. Pain worsened around this time and then has not gotten better. Cough also has not gone away. Also dealing with fatigue and nausea. Pain is constant and worsens throughout the day       History obtained from chart review and the patient.    SUBJECTIVE:  Chetna Borges is a 69 y.o. female that presents today for whole body pain, back pain    MDD/fibromyalgia  States she was sick over Thanksgiving with respiratory illness  She started having widespread pain during this time and has persisted ever since-feels like its the muscles of her body  Hurts from the top of her head to her feet  States she has very low motivation and energy  Appetite has also been very low  Has been feeling very depressed and irritable-taking Lexapro and doesn't feel like its helping    She still has lingering cough since she was sick around Thanksgiving  She has tried Coricidin without improvement  She is still congested    Age/Gender Health Maintenance    Lipid -   Lab Results   Component Value Date    CHOL 227 (H) 01/23/2025    TRIG 70 01/23/2025    HDL 67 01/23/2025     DM Screen -   Hemoglobin A1C   Date Value Ref Range Status   01/23/2025 5.3 4.4 - 6.4 % Final     Colon Cancer Screening - needs  Lung Cancer Screening (Age 55 to 80 with 30 pack year hx, current smoker or quit within past 15 years) - n/a    Tetanus - next due 9/8/29  Influenza Vaccine - yearly  Pneumonia Vaccine - needs  Zostavax - complete   HPV Vaccine - n/a    Breast Cancer Screening - 12/28/23  Cervical Cancer Screening - n/a  Osteoporosis Screening - 12/28/23  Chlamydia Screen - n/a      Current Outpatient Medications   Medication Sig Dispense Refill    DULoxetine (CYMBALTA) 30 MG extended release capsule

## 2025-02-06 ENCOUNTER — TELEPHONE (OUTPATIENT)
Dept: FAMILY MEDICINE CLINIC | Age: 70
End: 2025-02-06

## 2025-02-06 NOTE — TELEPHONE ENCOUNTER
----- Message from SAMRA Carranza CNP sent at 2/6/2025  8:23 AM EST -----  Let Chetna know her labs so far are normal. 2 are still pending.

## 2025-02-06 NOTE — TELEPHONE ENCOUNTER
Left message on answering machine. Requested pt to call back at [phone], at their earliest convenience.

## 2025-02-07 LAB — CYCLIC CITRULLINATED PEPTIDE ANTIBODY IGG: 1.2 U/ML (ref 0–7)

## 2025-02-08 LAB — NUCLEAR IGG SER QL IA: NORMAL

## 2025-02-10 ENCOUNTER — TELEPHONE (OUTPATIENT)
Dept: FAMILY MEDICINE CLINIC | Age: 70
End: 2025-02-10

## 2025-02-10 NOTE — TELEPHONE ENCOUNTER
----- Message from SAMRA Carranza - CNP sent at 2/10/2025  8:38 AM EST -----  Let Chetna know her last autoimmune labs are negative.

## 2025-02-12 DIAGNOSIS — R51.9 ACUTE NONINTRACTABLE HEADACHE, UNSPECIFIED HEADACHE TYPE: ICD-10-CM

## 2025-02-12 RX ORDER — BUTALBITAL, ACETAMINOPHEN AND CAFFEINE 50; 325; 40 MG/1; MG/1; MG/1
1 TABLET ORAL EVERY 4 HOURS PRN
Qty: 20 TABLET | Refills: 0 | Status: SHIPPED | OUTPATIENT
Start: 2025-02-12

## 2025-02-12 NOTE — TELEPHONE ENCOUNTER
Controlled Substance Monitoring:    Acute and Chronic Pain Monitoring:   RX Monitoring Periodic Controlled Substance Monitoring   2/12/2025   9:31 AM No signs of potential drug abuse or diversion identified.

## 2025-02-12 NOTE — TELEPHONE ENCOUNTER
Recent Visits  Date Type Provider Dept   02/05/25 Office Visit Rogelio Martinez APRN - CNP [facility]   10/24/24 Office Visit Rogelio Martinez APRN - CNP [facility]   06/20/24 Office Visit Rogelio Martinez APRN - CNP [facility]   05/13/24 Office Visit Rogelio Martinez APRN - CNP [facility]   04/01/24 Office Visit Rogelio Martinez APRN - CNP [facility]   02/19/24 Office Visit Rogelio Martinez APRN - CNP [facility]   01/22/24 Office Visit Rogelio Martinez APRN - CNP [facility]   09/27/23 Office Visit Sherlyn Carson MD [facility]   Showing recent visits within past 540 days with a meds authorizing provider and meeting all other requirements  Future Appointments  Date Type Provider Dept   03/24/25 Appointment Rogelio Martinez APRN - CNP [facility]   Showing future appointments within next 150 days with a meds authorizing provider and meeting all other requirements

## 2025-02-17 ENCOUNTER — TELEPHONE (OUTPATIENT)
Dept: FAMILY MEDICINE CLINIC | Age: 70
End: 2025-02-17

## 2025-02-17 DIAGNOSIS — M25.352 HIP INSTABILITY, LEFT: ICD-10-CM

## 2025-02-17 DIAGNOSIS — M25.552 LEFT HIP PAIN: Primary | ICD-10-CM

## 2025-02-17 DIAGNOSIS — M23.52 RECURRENT LEFT KNEE INSTABILITY: ICD-10-CM

## 2025-02-17 DIAGNOSIS — M25.562 LEFT KNEE PAIN, UNSPECIFIED CHRONICITY: ICD-10-CM

## 2025-02-17 NOTE — TELEPHONE ENCOUNTER
Pt has taken 2 falls in the past week. She is thinking it is her left knee but  thinks it is her left hip that is giving out. She is asking for a MRI to see what might be causing her to fall.

## 2025-02-17 NOTE — TELEPHONE ENCOUNTER
Pt informed of message. Pt voiced understanding with no further questions at this time.       Transferred to central scheduling.

## 2025-03-05 ENCOUNTER — HOSPITAL ENCOUNTER (OUTPATIENT)
Dept: MRI IMAGING | Age: 70
Discharge: HOME | End: 2025-03-05
Payer: MEDICARE

## 2025-03-05 ENCOUNTER — TELEPHONE (OUTPATIENT)
Dept: FAMILY MEDICINE CLINIC | Age: 70
End: 2025-03-05

## 2025-03-05 DIAGNOSIS — M25.562 LEFT KNEE PAIN, UNSPECIFIED CHRONICITY: ICD-10-CM

## 2025-03-05 DIAGNOSIS — M25.352 HIP INSTABILITY, LEFT: ICD-10-CM

## 2025-03-05 DIAGNOSIS — M23.52 RECURRENT LEFT KNEE INSTABILITY: ICD-10-CM

## 2025-03-05 DIAGNOSIS — S83.242A TEAR OF MEDIAL MENISCUS OF LEFT KNEE, CURRENT, UNSPECIFIED TEAR TYPE, INITIAL ENCOUNTER: ICD-10-CM

## 2025-03-05 DIAGNOSIS — M25.552 LEFT HIP PAIN: ICD-10-CM

## 2025-03-05 DIAGNOSIS — M25.852 FEMOROACETABULAR IMPINGEMENT OF BOTH HIPS: Primary | ICD-10-CM

## 2025-03-05 DIAGNOSIS — M25.851 FEMOROACETABULAR IMPINGEMENT OF BOTH HIPS: Primary | ICD-10-CM

## 2025-03-05 PROCEDURE — 73721 MRI JNT OF LWR EXTRE W/O DYE: CPT

## 2025-03-05 NOTE — TELEPHONE ENCOUNTER
Pt informed of MRI results and recommendations . Pt voiced understanding with no further questions at this time.     Pt would like to go to [facility] in [place] or whomever Rogelio recommends

## 2025-03-05 NOTE — TELEPHONE ENCOUNTER
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 Treatment for this is generally some hip injections and physical therapy. Also, her knee MRI shows a tear in her medial meniscus. I would refer her to orthopedic for both of these issues. Does she have a preference where she goes?

## 2025-03-20 DIAGNOSIS — R51.9 ACUTE NONINTRACTABLE HEADACHE, UNSPECIFIED HEADACHE TYPE: ICD-10-CM

## 2025-03-20 RX ORDER — BUTALBITAL, ACETAMINOPHEN AND CAFFEINE 50; 325; 40 MG/1; MG/1; MG/1
1 TABLET ORAL EVERY 4 HOURS PRN
Qty: 20 TABLET | Refills: 0 | Status: SHIPPED | OUTPATIENT
Start: 2025-03-20

## 2025-03-20 NOTE — TELEPHONE ENCOUNTER
Recent Visits  Date Type Provider Dept   02/05/25 Office Visit Rogelio Martinez APRN - CNP [facility]   10/24/24 Office Visit Rogelio Martinez APRN - CNP [facility]   06/20/24 Office Visit Rogelio Martinez APRN - CNP [facility]   05/13/24 Office Visit Rogelio Martinez APRN - CNP [facility]   04/01/24 Office Visit Rogelio Martinez APRN - CNP [facility]   02/19/24 Office Visit Rogelio Martinez APRN - CNP [facility]   01/22/24 Office Visit Rogelio Martinez APRN - CNP [facility]   09/27/23 Office Visit Sherlyn Carson MD [facility]   Showing recent visits within past 540 days with a meds authorizing provider and meeting all other requirements  Future Appointments  Date Type Provider Dept   03/24/25 Appointment Rogelio Martinez APRN - CNP [facility]   Showing future appointments within next 150 days with a meds authorizing provider and meeting all other requirements

## 2025-03-20 NOTE — TELEPHONE ENCOUNTER
I reviewed an [facility] report on patient today.  There were no abnormal findings on the report.  Rx sent  Electronically signed by SAMRA Mace CNP on 3/20/25 at 12:59 PM EDT

## 2025-03-24 ENCOUNTER — HOSPITAL ENCOUNTER (OUTPATIENT)
Age: 70
Discharge: HOME | End: 2025-03-24
Payer: MEDICARE

## 2025-03-24 ENCOUNTER — OFFICE VISIT (OUTPATIENT)
Dept: FAMILY MEDICINE CLINIC | Age: 70
End: 2025-03-24
Payer: MEDICARE

## 2025-03-24 VITALS
HEART RATE: 70 BPM | RESPIRATION RATE: 20 BRPM | DIASTOLIC BLOOD PRESSURE: 82 MMHG | OXYGEN SATURATION: 98 % | BODY MASS INDEX: 30.76 KG/M2 | SYSTOLIC BLOOD PRESSURE: 128 MMHG | TEMPERATURE: 96.8 F | HEIGHT: 63 IN | WEIGHT: 173.6 LBS

## 2025-03-24 DIAGNOSIS — R53.83 FATIGUE, UNSPECIFIED TYPE: Primary | ICD-10-CM

## 2025-03-24 DIAGNOSIS — F33.1 MODERATE EPISODE OF RECURRENT MAJOR DEPRESSIVE DISORDER (HCC): ICD-10-CM

## 2025-03-24 DIAGNOSIS — E55.9 VITAMIN D DEFICIENCY: ICD-10-CM

## 2025-03-24 DIAGNOSIS — E53.8 VITAMIN B12 DEFICIENCY: ICD-10-CM

## 2025-03-24 DIAGNOSIS — M79.7 FIBROMYALGIA: ICD-10-CM

## 2025-03-24 DIAGNOSIS — R53.83 FATIGUE, UNSPECIFIED TYPE: ICD-10-CM

## 2025-03-24 DIAGNOSIS — J44.1 ACUTE EXACERBATION OF CHRONIC OBSTRUCTIVE PULMONARY DISEASE (COPD) (HCC): ICD-10-CM

## 2025-03-24 LAB
25(OH)D3 SERPL-MCNC: 29 NG/ML (ref 30–100)
TSH SERPL DL<=0.05 MIU/L-ACNC: 2.21 UIU/ML (ref 0.27–4.2)
VIT B12 SERPL-MCNC: 282 PG/ML (ref 232–1245)

## 2025-03-24 PROCEDURE — 4004F PT TOBACCO SCREEN RCVD TLK: CPT | Performed by: NURSE PRACTITIONER

## 2025-03-24 PROCEDURE — 82306 VITAMIN D 25 HYDROXY: CPT

## 2025-03-24 PROCEDURE — 1123F ACP DISCUSS/DSCN MKR DOCD: CPT | Performed by: NURSE PRACTITIONER

## 2025-03-24 PROCEDURE — 84443 ASSAY THYROID STIM HORMONE: CPT

## 2025-03-24 PROCEDURE — 3023F SPIROM DOC REV: CPT | Performed by: NURSE PRACTITIONER

## 2025-03-24 PROCEDURE — 99214 OFFICE O/P EST MOD 30 MIN: CPT | Performed by: NURSE PRACTITIONER

## 2025-03-24 PROCEDURE — G8417 CALC BMI ABV UP PARAM F/U: HCPCS | Performed by: NURSE PRACTITIONER

## 2025-03-24 PROCEDURE — 82607 VITAMIN B-12: CPT

## 2025-03-24 PROCEDURE — 1090F PRES/ABSN URINE INCON ASSESS: CPT | Performed by: NURSE PRACTITIONER

## 2025-03-24 PROCEDURE — G8399 PT W/DXA RESULTS DOCUMENT: HCPCS | Performed by: NURSE PRACTITIONER

## 2025-03-24 PROCEDURE — G8427 DOCREV CUR MEDS BY ELIG CLIN: HCPCS | Performed by: NURSE PRACTITIONER

## 2025-03-24 PROCEDURE — 36415 COLL VENOUS BLD VENIPUNCTURE: CPT

## 2025-03-24 PROCEDURE — 3017F COLORECTAL CA SCREEN DOC REV: CPT | Performed by: NURSE PRACTITIONER

## 2025-03-24 PROCEDURE — 1159F MED LIST DOCD IN RCRD: CPT | Performed by: NURSE PRACTITIONER

## 2025-03-24 RX ORDER — DULOXETINE 40 MG/1
40 CAPSULE, DELAYED RELEASE ORAL DAILY
Qty: 30 CAPSULE | Refills: 1 | Status: SHIPPED | OUTPATIENT
Start: 2025-03-24

## 2025-03-24 NOTE — PROGRESS NOTES
[facility] at [facility]  [address] [place] [address]  Chief Complaint   Patient presents with    Depression     States has not gotten better, had 3 friends and a sister in law pass away in the last 3 weeks. Would also like to discuss fatigue.        History obtained from chart review and the patient.    SUBJECTIVE:  Chetna Borges is a 69 y.o. female that presents today for whole body pain, back pain    MDD/fibromyalgia  Lost 3 very close family members in the last 3 weeks  Really struggling feeling depressed  Energy is very poor, motivation is terrible  Still struggling with a lot of knee and hip pain, left knee instability  Feels like her knee will give out on her-has actually fallen because of it  Went to [facility] and was very unimpressed, has appt with [facility] next week for second opinion  [facility] wanted to send her to pain management for her back, but Chetna doesn't want to do this      Age/Gender Health Maintenance    Lipid -   Lab Results   Component Value Date    CHOL 227 (H) 01/23/2025    TRIG 70 01/23/2025    HDL 67 01/23/2025     DM Screen -   Hemoglobin A1C   Date Value Ref Range Status   01/23/2025 5.3 4.4 - 6.4 % Final     Colon Cancer Screening - needs  Lung Cancer Screening (Age 55 to 80 with 30 pack year hx, current smoker or quit within past 15 years) - n/a    Tetanus - next due 9/8/29  Influenza Vaccine - yearly  Pneumonia Vaccine - needs  Zostavax - complete   HPV Vaccine - n/a    Breast Cancer Screening - 12/28/23  Cervical Cancer Screening - n/a  Osteoporosis Screening - 12/28/23  Chlamydia Screen - n/a      Current Outpatient Medications   Medication Sig Dispense Refill    DULoxetine 40 MG CPEP Take 40 mg by mouth daily 30 capsule 1    butalbital-acetaminophen-caffeine (FIORICET, ESGIC) -40 MG per tablet Take 1 tablet by mouth every 4 hours as needed for Headaches 20 tablet 0    fluticasone (FLONASE) 50 MCG/ACT nasal spray 2 sprays by Each Nostril route daily 1 each 1

## 2025-03-25 ENCOUNTER — TELEPHONE (OUTPATIENT)
Dept: FAMILY MEDICINE CLINIC | Age: 70
End: 2025-03-25

## 2025-03-25 ENCOUNTER — RESULTS FOLLOW-UP (OUTPATIENT)
Dept: FAMILY MEDICINE CLINIC | Age: 70
End: 2025-03-25

## 2025-03-25 NOTE — TELEPHONE ENCOUNTER
Rogelio Martinez, APRN - CNP  P [facility]  Let Chetna know her thyroid lab is normal. Vitamin D borderline low at 29. Is she still taking vitamin D supplement? I have 5000 units on her med list, but looks like she may be buying OTC.  Also her b12 was 282 which is on the low end of normal. I am still ok with her doing the 1000 mcg every 30 days.

## 2025-03-26 NOTE — TELEPHONE ENCOUNTER
Pt informed of continuing the Vit D. Pt voiced understanding with no further questions at this time.

## 2025-04-01 ENCOUNTER — TELEPHONE (OUTPATIENT)
Dept: FAMILY MEDICINE CLINIC | Age: 70
End: 2025-04-01

## 2025-04-01 ENCOUNTER — TELEPHONE (OUTPATIENT)
Dept: CARDIOLOGY CLINIC | Age: 70
End: 2025-04-01

## 2025-04-01 NOTE — TELEPHONE ENCOUNTER
Patient called saying she is having a left knee scope with Dr. Mancera come up on April 11, 2025.     Chetna called asking if she need to see Rogelio.     I was unsure.

## 2025-04-01 NOTE — TELEPHONE ENCOUNTER
Pt informed that she does not need clearance through Rogelio but will need to talk to her Cardiologist

## 2025-04-01 NOTE — TELEPHONE ENCOUNTER
Left message on answering machine. Requested pt to call back at [phone], at their earliest convenience.

## 2025-04-01 NOTE — TELEPHONE ENCOUNTER
I do not need to see her to clear her. She will need clearance from her cardiologist as well though.

## 2025-04-01 NOTE — TELEPHONE ENCOUNTER
I called and spoke with Maribell she stated that she faxed over the pre op paperwork and received a successful on their end. I had her verified the number they sent it to. It was correct.     I asked for her to re fax it. She state she will.

## 2025-04-01 NOTE — TELEPHONE ENCOUNTER
Pre op Risk Assessment    Procedure left knee arthroscopy with meniscus repair   Physician Ryan Choe   Date of surgery/procedure 4/11/25    Last OV 1/10/25  Last Stress 6/7/22  Last Echo 1/23/25  Last Cath 1/23/25  Last Stent none in epic   Is patient on blood thinners ASA  Hold Meds/how many days ??    Also wants results of recent testing sent with clearance     Fax: [phone]

## 2025-05-05 ENCOUNTER — OFFICE VISIT (OUTPATIENT)
Dept: FAMILY MEDICINE CLINIC | Age: 70
End: 2025-05-05
Payer: MEDICARE

## 2025-05-05 VITALS
SYSTOLIC BLOOD PRESSURE: 138 MMHG | WEIGHT: 168.6 LBS | TEMPERATURE: 97.8 F | HEIGHT: 63 IN | HEART RATE: 95 BPM | OXYGEN SATURATION: 99 % | BODY MASS INDEX: 29.88 KG/M2 | RESPIRATION RATE: 12 BRPM | DIASTOLIC BLOOD PRESSURE: 78 MMHG

## 2025-05-05 DIAGNOSIS — J01.90 ACUTE RHINOSINUSITIS: ICD-10-CM

## 2025-05-05 DIAGNOSIS — F33.1 MODERATE EPISODE OF RECURRENT MAJOR DEPRESSIVE DISORDER (HCC): Primary | ICD-10-CM

## 2025-05-05 DIAGNOSIS — M79.7 FIBROMYALGIA: ICD-10-CM

## 2025-05-05 DIAGNOSIS — M54.16 LUMBAR RADICULOPATHY: ICD-10-CM

## 2025-05-05 DIAGNOSIS — R51.9 ACUTE NONINTRACTABLE HEADACHE, UNSPECIFIED HEADACHE TYPE: ICD-10-CM

## 2025-05-05 PROCEDURE — G8427 DOCREV CUR MEDS BY ELIG CLIN: HCPCS | Performed by: NURSE PRACTITIONER

## 2025-05-05 PROCEDURE — G8417 CALC BMI ABV UP PARAM F/U: HCPCS | Performed by: NURSE PRACTITIONER

## 2025-05-05 PROCEDURE — 1090F PRES/ABSN URINE INCON ASSESS: CPT | Performed by: NURSE PRACTITIONER

## 2025-05-05 PROCEDURE — 4004F PT TOBACCO SCREEN RCVD TLK: CPT | Performed by: NURSE PRACTITIONER

## 2025-05-05 PROCEDURE — G8399 PT W/DXA RESULTS DOCUMENT: HCPCS | Performed by: NURSE PRACTITIONER

## 2025-05-05 PROCEDURE — 1123F ACP DISCUSS/DSCN MKR DOCD: CPT | Performed by: NURSE PRACTITIONER

## 2025-05-05 PROCEDURE — 99214 OFFICE O/P EST MOD 30 MIN: CPT | Performed by: NURSE PRACTITIONER

## 2025-05-05 PROCEDURE — 3017F COLORECTAL CA SCREEN DOC REV: CPT | Performed by: NURSE PRACTITIONER

## 2025-05-05 PROCEDURE — 1159F MED LIST DOCD IN RCRD: CPT | Performed by: NURSE PRACTITIONER

## 2025-05-05 RX ORDER — BUTALBITAL, ACETAMINOPHEN AND CAFFEINE 50; 325; 40 MG/1; MG/1; MG/1
1 TABLET ORAL EVERY 4 HOURS PRN
Qty: 60 TABLET | Refills: 0 | Status: SHIPPED | OUTPATIENT
Start: 2025-05-05

## 2025-05-05 RX ORDER — DULOXETIN HYDROCHLORIDE 20 MG/1
40 CAPSULE, DELAYED RELEASE ORAL DAILY
Qty: 60 CAPSULE | Refills: 1 | Status: SHIPPED | OUTPATIENT
Start: 2025-05-05

## 2025-05-05 NOTE — PROGRESS NOTES
[facility] Medicine at [facility]  [address] [place] [address]  Chief Complaint   Patient presents with    Depression     States pain is worsening in legs that goes from Hips down to feet, pain is now constant. Also wants to discuss wet productive cough that has been going on for about a month. Slightly improving.        History obtained from chart review and the patient.    SUBJECTIVE:  Chetna Borges is a 69 y.o. female that presents today for whole body pain, back pain    MDD/Fibromyalgia/Chronic Pain  Stopped the Cymbalta completely for about 1 month  The 40 mg dose was too expensive  She can tell that it was helping her with her mood and her pain  Symptoms have gotten worse since she stopped it  Worsening pain going down her legs all the way to her feet  She is still taking her Gabapentin    C/o cough and congestion for about 1 month  Is slightly improving  Cough is productive    Age/Gender Health Maintenance    Lipid -   Lab Results   Component Value Date    CHOL 227 (H) 01/23/2025    TRIG 70 01/23/2025    HDL 67 01/23/2025     DM Screen -   Hemoglobin A1C   Date Value Ref Range Status   01/23/2025 5.3 4.4 - 6.4 % Final     Colon Cancer Screening - needs  Lung Cancer Screening (Age 55 to 80 with 30 pack year hx, current smoker or quit within past 15 years) - n/a    Tetanus - next due 9/8/29  Influenza Vaccine - yearly  Pneumonia Vaccine - needs  Zostavax - complete   HPV Vaccine - n/a    Breast Cancer Screening - 12/28/23  Cervical Cancer Screening - n/a  Osteoporosis Screening - 12/28/23  Chlamydia Screen - n/a      Current Outpatient Medications   Medication Sig Dispense Refill    DULoxetine (CYMBALTA) 20 MG extended release capsule Take 2 capsules by mouth daily 60 capsule 1    amoxicillin-clavulanate (AUGMENTIN) 875-125 MG per tablet Take 1 tablet by mouth 2 times daily for 10 days 20 tablet 0    butalbital-acetaminophen-caffeine (FIORICET, ESGIC) -40 MG per tablet Take 1 tablet by mouth

## 2025-05-05 NOTE — TELEPHONE ENCOUNTER
Recent Visits  Date Type Provider Dept   03/24/25 Office Visit Rogelio Martinez, APRN - CNP [facility]   02/05/25 Office Visit Rogelio Martinez, APRN - CNP [facility]   10/24/24 Office Visit Rogelio Martinez, APRN - CNP [facility]   06/20/24 Office Visit Rogelio Martinez, APRN - CNP [facility]   05/13/24 Office Visit Rogelio Martinez APRN - CNP [facility]   04/01/24 Office Visit Rogelio Martinez APRN - CNP [facility]   02/19/24 Office Visit Rogelio Martinez APRN - CNP [facility]   01/22/24 Office Visit Rogelio Martinez, APRN - CNP [facility]   Showing recent visits within past 540 days with a meds authorizing provider and meeting all other requirements  Today's Visits  Date Type Provider Dept   05/05/25 Office Visit Rogelio Martinez APRN - CNP [facility]   Showing today's visits with a meds authorizing provider and meeting all other requirements  Future Appointments  No visits were found meeting these conditions.  Showing future appointments within next 150 days with a meds authorizing provider and meeting all other requirements

## 2025-05-05 NOTE — TELEPHONE ENCOUNTER
Controlled Substance Monitoring:    Acute and Chronic Pain Monitoring:   RX Monitoring Periodic Controlled Substance Monitoring   5/5/2025  10:38 AM No signs of potential drug abuse or diversion identified.

## 2025-05-22 ENCOUNTER — HOSPITAL ENCOUNTER (OUTPATIENT)
Dept: WOMENS IMAGING | Age: 70
Discharge: HOME | End: 2025-05-22
Payer: MEDICARE

## 2025-05-22 ENCOUNTER — RESULTS FOLLOW-UP (OUTPATIENT)
Dept: FAMILY MEDICINE CLINIC | Age: 70
End: 2025-05-22

## 2025-05-22 VITALS — WEIGHT: 168 LBS | HEIGHT: 63 IN | BODY MASS INDEX: 29.77 KG/M2

## 2025-05-22 DIAGNOSIS — Z12.31 ENCOUNTER FOR SCREENING MAMMOGRAM FOR MALIGNANT NEOPLASM OF BREAST: ICD-10-CM

## 2025-05-22 PROCEDURE — 77063 BREAST TOMOSYNTHESIS BI: CPT

## 2025-05-23 NOTE — TELEPHONE ENCOUNTER
----- Message from Dr. Sudhir Cottrell, DO sent at 5/22/2025  9:15 PM EDT -----  Please let pt know that mammogram is normal. Let me know if questions, thanks!

## 2025-05-27 ENCOUNTER — TELEPHONE (OUTPATIENT)
Dept: FAMILY MEDICINE CLINIC | Age: 70
End: 2025-05-27

## 2025-05-27 DIAGNOSIS — B02.9 HERPES ZOSTER WITHOUT COMPLICATION: Primary | ICD-10-CM

## 2025-05-27 RX ORDER — BETAMETHASONE VALERATE 1.2 MG/G
CREAM TOPICAL
Qty: 45 G | Refills: 0 | Status: SHIPPED | OUTPATIENT
Start: 2025-05-27 | End: 2025-06-03

## 2025-05-27 RX ORDER — ACYCLOVIR 800 MG/1
800 TABLET ORAL
Qty: 35 TABLET | Refills: 0 | Status: SHIPPED | OUTPATIENT
Start: 2025-05-27 | End: 2025-06-03

## 2025-05-27 NOTE — TELEPHONE ENCOUNTER
Patient called in saying she had a flare up of shingles on her lower back and hip. She in pain (8). She states she had this in the past. Uses [facility] in [place] if you can send in medication..

## 2025-05-28 ENCOUNTER — HOSPITAL ENCOUNTER (OUTPATIENT)
Dept: MRI IMAGING | Age: 70
Discharge: HOME | End: 2025-05-28
Payer: MEDICARE

## 2025-05-28 ENCOUNTER — TELEPHONE (OUTPATIENT)
Dept: FAMILY MEDICINE CLINIC | Age: 70
End: 2025-05-28

## 2025-05-28 ENCOUNTER — RESULTS FOLLOW-UP (OUTPATIENT)
Dept: FAMILY MEDICINE CLINIC | Age: 70
End: 2025-05-28

## 2025-05-28 DIAGNOSIS — M54.16 LUMBAR RADICULOPATHY: ICD-10-CM

## 2025-05-28 PROCEDURE — 72148 MRI LUMBAR SPINE W/O DYE: CPT

## 2025-05-28 NOTE — TELEPHONE ENCOUNTER
Pt informed of MRI results. Pt voiced understanding with no further questions at this time.       She wants to hold off on PM for now. She wants to try to just control it with Tylenol and Motrin. She said she will call us if things get worse and she is not able to manage the pain anymore.

## 2025-05-28 NOTE — TELEPHONE ENCOUNTER
Rogelio Martinez, APRN - CNP  [facility]  Let Chetna know her lumbar MRI does show some pretty severe arthritis and degenerative changes in her lumbar spine, as well as a bulging disc at L2-L3, L3-L4. Honestly, next steps would be to see pain management. I don't have much to offer her. I know she's been to pain management in the past, would she be opposed to seeing a different pain management doctor?

## 2025-06-02 ENCOUNTER — OFFICE VISIT (OUTPATIENT)
Dept: FAMILY MEDICINE CLINIC | Age: 70
End: 2025-06-02
Payer: MEDICARE

## 2025-06-02 VITALS
HEIGHT: 63 IN | WEIGHT: 166.2 LBS | OXYGEN SATURATION: 96 % | DIASTOLIC BLOOD PRESSURE: 78 MMHG | SYSTOLIC BLOOD PRESSURE: 138 MMHG | HEART RATE: 80 BPM | BODY MASS INDEX: 29.45 KG/M2 | RESPIRATION RATE: 12 BRPM | TEMPERATURE: 96.8 F

## 2025-06-02 DIAGNOSIS — F33.1 MODERATE EPISODE OF RECURRENT MAJOR DEPRESSIVE DISORDER (HCC): ICD-10-CM

## 2025-06-02 DIAGNOSIS — F17.210 CIGARETTE NICOTINE DEPENDENCE WITHOUT COMPLICATION: Primary | ICD-10-CM

## 2025-06-02 DIAGNOSIS — M79.7 FIBROMYALGIA: ICD-10-CM

## 2025-06-02 PROCEDURE — G8417 CALC BMI ABV UP PARAM F/U: HCPCS | Performed by: NURSE PRACTITIONER

## 2025-06-02 PROCEDURE — 1123F ACP DISCUSS/DSCN MKR DOCD: CPT | Performed by: NURSE PRACTITIONER

## 2025-06-02 PROCEDURE — 99214 OFFICE O/P EST MOD 30 MIN: CPT | Performed by: NURSE PRACTITIONER

## 2025-06-02 PROCEDURE — 1090F PRES/ABSN URINE INCON ASSESS: CPT | Performed by: NURSE PRACTITIONER

## 2025-06-02 PROCEDURE — G8427 DOCREV CUR MEDS BY ELIG CLIN: HCPCS | Performed by: NURSE PRACTITIONER

## 2025-06-02 PROCEDURE — G8399 PT W/DXA RESULTS DOCUMENT: HCPCS | Performed by: NURSE PRACTITIONER

## 2025-06-02 PROCEDURE — 3017F COLORECTAL CA SCREEN DOC REV: CPT | Performed by: NURSE PRACTITIONER

## 2025-06-02 PROCEDURE — 1160F RVW MEDS BY RX/DR IN RCRD: CPT | Performed by: NURSE PRACTITIONER

## 2025-06-02 PROCEDURE — 1125F AMNT PAIN NOTED PAIN PRSNT: CPT | Performed by: NURSE PRACTITIONER

## 2025-06-02 PROCEDURE — 1159F MED LIST DOCD IN RCRD: CPT | Performed by: NURSE PRACTITIONER

## 2025-06-02 PROCEDURE — 4004F PT TOBACCO SCREEN RCVD TLK: CPT | Performed by: NURSE PRACTITIONER

## 2025-06-02 RX ORDER — BUPROPION HYDROCHLORIDE 150 MG/1
TABLET, EXTENDED RELEASE ORAL
Qty: 60 TABLET | Refills: 1 | Status: SHIPPED | OUTPATIENT
Start: 2025-06-02

## 2025-06-02 RX ORDER — DULOXETIN HYDROCHLORIDE 20 MG/1
40 CAPSULE, DELAYED RELEASE ORAL DAILY
Qty: 60 CAPSULE | Refills: 1 | Status: SHIPPED | OUTPATIENT
Start: 2025-06-02

## 2025-06-02 NOTE — PROGRESS NOTES
marrow  edema associated with the facet degenerative changes on the right. There is  moderate to severe right foraminal stenosis. There is moderate severity left  foraminal stenosis.  2. Mild spinal canal stenosis and bilateral foraminal stenosis at the L3-4  level.     PHYSICAL EXAM:  Vitals:    06/02/25 0903   BP: 138/78   Pulse: 80   Resp: 12   Temp: 96.8 °F (36 °C)   TempSrc: Temporal   SpO2: 96%   Weight: 75.4 kg (166 lb 3.2 oz)   Height: 1.6 m (5' 3\")         Body mass index is 29.44 kg/m².  Pain Score:   6      VS Reviewed  General Appearance: A&O x 3, No acute distress,well developed and well- nourished  Head: normocephalic and atraumatic  Eyes: pupils equal, round, and reactive to light, extraocular eye movements intact, conjunctivae and eye lids without erythema  Neck: supple and non-tender without mass, no thyromegaly or thyroid nodules, no cervical lymphadenopathy  Pulmonary/Chest: clear to auscultation bilaterally- no wheezes, rales or rhonchi, normal air movement, no respiratory distress or retractions  Cardiovascular: S1 and S2 auscultated w/ RRR. No murmurs, rubs, clicks, or gallops, distal pulses intact.  Abdomen: soft, non-tender, non-distended, bowl sounds physiologic,  no rebound or guarding, no masses. Small right upper abdominal wall bulge. Liver and spleen without enlargement.   Extremities: no cyanosis, clubbing or edema of the lower extremities  Musculoskeletal: No joint swelling or gross deformity   Neuro:  Alert, 5/5 strength globally and symmetrically  Psych: Affect appropriate.  Mood normal. Thought process is normal without evidence of depression or psychosis. Good insight and appropriate interaction.  Cognition and memory appear to be intact.  Skin: warm and dry, no rash or erythema  Lymph:  No cervical, auricular or supraclavicular lymph nodes palpated    ASSESSMENT & PLAN  Chetna was seen today for depression.    Diagnoses and all orders for this visit:    Cigarette nicotine

## 2025-07-15 ENCOUNTER — OFFICE VISIT (OUTPATIENT)
Dept: FAMILY MEDICINE CLINIC | Age: 70
End: 2025-07-15

## 2025-07-15 VITALS
DIASTOLIC BLOOD PRESSURE: 68 MMHG | RESPIRATION RATE: 12 BRPM | TEMPERATURE: 96.8 F | WEIGHT: 169.8 LBS | HEIGHT: 63 IN | SYSTOLIC BLOOD PRESSURE: 122 MMHG | HEART RATE: 68 BPM | BODY MASS INDEX: 30.09 KG/M2 | OXYGEN SATURATION: 98 %

## 2025-07-15 DIAGNOSIS — F33.41 RECURRENT MAJOR DEPRESSIVE DISORDER, IN PARTIAL REMISSION: ICD-10-CM

## 2025-07-15 DIAGNOSIS — M79.7 FIBROMYALGIA: ICD-10-CM

## 2025-07-15 DIAGNOSIS — F17.210 CIGARETTE NICOTINE DEPENDENCE WITHOUT COMPLICATION: ICD-10-CM

## 2025-07-15 RX ORDER — BUPROPION HYDROCHLORIDE 150 MG/1
150 TABLET, EXTENDED RELEASE ORAL 2 TIMES DAILY
Qty: 180 TABLET | Refills: 3 | Status: SHIPPED | OUTPATIENT
Start: 2025-07-15

## 2025-07-15 RX ORDER — DULOXETIN HYDROCHLORIDE 20 MG/1
40 CAPSULE, DELAYED RELEASE ORAL DAILY
Qty: 180 CAPSULE | Refills: 3 | Status: SHIPPED | OUTPATIENT
Start: 2025-07-15

## 2025-08-05 ENCOUNTER — TELEPHONE (OUTPATIENT)
Dept: FAMILY MEDICINE CLINIC | Age: 70
End: 2025-08-05

## 2025-08-05 ENCOUNTER — OFFICE VISIT (OUTPATIENT)
Dept: FAMILY MEDICINE CLINIC | Age: 70
End: 2025-08-05
Payer: MEDICARE

## 2025-08-05 ENCOUNTER — HOSPITAL ENCOUNTER (OUTPATIENT)
Dept: CT IMAGING | Age: 70
Discharge: HOME | End: 2025-08-05
Payer: MEDICARE

## 2025-08-05 VITALS
BODY MASS INDEX: 29.09 KG/M2 | OXYGEN SATURATION: 96 % | HEIGHT: 63 IN | TEMPERATURE: 97.8 F | WEIGHT: 164.2 LBS | RESPIRATION RATE: 18 BRPM | HEART RATE: 75 BPM | SYSTOLIC BLOOD PRESSURE: 112 MMHG | DIASTOLIC BLOOD PRESSURE: 68 MMHG

## 2025-08-05 DIAGNOSIS — R42 DIZZINESS AND GIDDINESS: Primary | ICD-10-CM

## 2025-08-05 DIAGNOSIS — E78.5 DYSLIPIDEMIA: Primary | ICD-10-CM

## 2025-08-05 DIAGNOSIS — Z86.73 OLD CEREBROVASCULAR ACCIDENT (CVA) WITHOUT LATE EFFECT: ICD-10-CM

## 2025-08-05 DIAGNOSIS — R42 DIZZINESS AND GIDDINESS: ICD-10-CM

## 2025-08-05 PROCEDURE — G8427 DOCREV CUR MEDS BY ELIG CLIN: HCPCS | Performed by: NURSE PRACTITIONER

## 2025-08-05 PROCEDURE — 3017F COLORECTAL CA SCREEN DOC REV: CPT | Performed by: NURSE PRACTITIONER

## 2025-08-05 PROCEDURE — G8417 CALC BMI ABV UP PARAM F/U: HCPCS | Performed by: NURSE PRACTITIONER

## 2025-08-05 PROCEDURE — 99214 OFFICE O/P EST MOD 30 MIN: CPT | Performed by: NURSE PRACTITIONER

## 2025-08-05 PROCEDURE — G8399 PT W/DXA RESULTS DOCUMENT: HCPCS | Performed by: NURSE PRACTITIONER

## 2025-08-05 PROCEDURE — 1123F ACP DISCUSS/DSCN MKR DOCD: CPT | Performed by: NURSE PRACTITIONER

## 2025-08-05 PROCEDURE — 70450 CT HEAD/BRAIN W/O DYE: CPT

## 2025-08-05 PROCEDURE — 1090F PRES/ABSN URINE INCON ASSESS: CPT | Performed by: NURSE PRACTITIONER

## 2025-08-05 PROCEDURE — 4004F PT TOBACCO SCREEN RCVD TLK: CPT | Performed by: NURSE PRACTITIONER

## 2025-08-05 PROCEDURE — 1159F MED LIST DOCD IN RCRD: CPT | Performed by: NURSE PRACTITIONER

## 2025-08-05 RX ORDER — MECLIZINE HCL 12.5 MG 12.5 MG/1
TABLET ORAL
Qty: 30 TABLET | Refills: 1 | Status: SHIPPED | OUTPATIENT
Start: 2025-08-05

## 2025-08-06 RX ORDER — ROSUVASTATIN CALCIUM 10 MG/1
10 TABLET, COATED ORAL DAILY
Qty: 90 TABLET | Refills: 1 | Status: SHIPPED | OUTPATIENT
Start: 2025-08-06

## 2025-08-20 ENCOUNTER — OFFICE VISIT (OUTPATIENT)
Dept: FAMILY MEDICINE CLINIC | Age: 70
End: 2025-08-20

## 2025-08-20 VITALS
HEART RATE: 91 BPM | WEIGHT: 164.9 LBS | SYSTOLIC BLOOD PRESSURE: 112 MMHG | BODY MASS INDEX: 29.22 KG/M2 | OXYGEN SATURATION: 96 % | DIASTOLIC BLOOD PRESSURE: 68 MMHG | RESPIRATION RATE: 18 BRPM | TEMPERATURE: 97.5 F | HEIGHT: 63 IN

## 2025-08-20 DIAGNOSIS — H02.831 DERMATOCHALASIS OF RIGHT UPPER EYELID: ICD-10-CM

## 2025-08-20 DIAGNOSIS — Z01.818 PRE-OP EXAMINATION: Primary | ICD-10-CM

## 2025-08-20 DIAGNOSIS — H02.834 DERMATOCHALASIS OF LEFT UPPER EYELID: ICD-10-CM

## 2025-08-20 DIAGNOSIS — I25.10 CORONARY ARTERY DISEASE INVOLVING NATIVE CORONARY ARTERY OF NATIVE HEART WITHOUT ANGINA PECTORIS: ICD-10-CM
